# Patient Record
Sex: FEMALE | Race: WHITE | NOT HISPANIC OR LATINO | Employment: FULL TIME | URBAN - METROPOLITAN AREA
[De-identification: names, ages, dates, MRNs, and addresses within clinical notes are randomized per-mention and may not be internally consistent; named-entity substitution may affect disease eponyms.]

---

## 2018-11-29 ENCOUNTER — OFFICE VISIT (OUTPATIENT)
Dept: OBSTETRICS AND GYNECOLOGY | Facility: CLINIC | Age: 58
End: 2018-11-29
Payer: COMMERCIAL

## 2018-11-29 ENCOUNTER — HOSPITAL ENCOUNTER (OUTPATIENT)
Dept: RADIOLOGY | Facility: HOSPITAL | Age: 58
Discharge: HOME | End: 2018-11-29
Attending: OBSTETRICS & GYNECOLOGY
Payer: COMMERCIAL

## 2018-11-29 VITALS
WEIGHT: 122.81 LBS | WEIGHT: 122 LBS | BODY MASS INDEX: 23.95 KG/M2 | SYSTOLIC BLOOD PRESSURE: 112 MMHG | DIASTOLIC BLOOD PRESSURE: 80 MMHG | HEIGHT: 60 IN

## 2018-11-29 DIAGNOSIS — E78.5 HYPERLIPIDEMIA, UNSPECIFIED HYPERLIPIDEMIA TYPE: ICD-10-CM

## 2018-11-29 DIAGNOSIS — Z12.39 SCREENING FOR MALIGNANT NEOPLASM OF BREAST: ICD-10-CM

## 2018-11-29 DIAGNOSIS — D25.9 UTERINE LEIOMYOMA, UNSPECIFIED LOCATION: ICD-10-CM

## 2018-11-29 DIAGNOSIS — Z01.419 WELL WOMAN EXAM WITH ROUTINE GYNECOLOGICAL EXAM: Primary | ICD-10-CM

## 2018-11-29 DIAGNOSIS — N89.8 VAGINAL DRYNESS: ICD-10-CM

## 2018-11-29 PROCEDURE — 99999 PR PBB SHADOW E&M-NEW PATIENT-LVL III: CPT | Mod: PBBFAC,,, | Performed by: OBSTETRICS & GYNECOLOGY

## 2018-11-29 PROCEDURE — 77067 SCR MAMMO BI INCL CAD: CPT | Mod: 26,,, | Performed by: RADIOLOGY

## 2018-11-29 PROCEDURE — 77063 BREAST TOMOSYNTHESIS BI: CPT | Mod: 26,,, | Performed by: RADIOLOGY

## 2018-11-29 PROCEDURE — 87624 HPV HI-RISK TYP POOLED RSLT: CPT

## 2018-11-29 PROCEDURE — 99386 PREV VISIT NEW AGE 40-64: CPT | Mod: S$GLB,,, | Performed by: OBSTETRICS & GYNECOLOGY

## 2018-11-29 PROCEDURE — 77063 BREAST TOMOSYNTHESIS BI: CPT | Mod: TC,PN

## 2018-11-29 PROCEDURE — 88175 CYTOPATH C/V AUTO FLUID REDO: CPT

## 2018-11-29 RX ORDER — ESTRADIOL 10 UG/1
10 INSERT VAGINAL
Qty: 8 TABLET | Refills: 11 | Status: SHIPPED | OUTPATIENT
Start: 2018-11-29 | End: 2021-02-10

## 2018-11-29 NOTE — PROGRESS NOTES
Chief Complaint   Patient presents with    [facility]    Well Woman    Dyspareunia       History of Present Illness: Jess Gresham is a 58 y.o. female that presents today 2018 for well gyn visit. She reports sharp knife like pain with intercourse. She reports lower abdominal pain with running sharp pinch like at the beginning of the run. She tried premarin cream before that was messy.     Past Medical History:   Diagnosis Date    Abnormal Pap smear of cervix        Past Surgical History:   Procedure Laterality Date    COLONOSCOPY      KNEE SURGERY Bilateral        Current Outpatient Medications   Medication Sig Dispense Refill    estradiol (VAGIFEM) 10 mcg Tab Place 1 tablet (10 mcg total) vaginally every Monday and Thursday. 8 tablet 11     No current facility-administered medications for this visit.        Review of patient's allergies indicates:   Allergen Reactions    Ciprofloxacin Anaphylaxis    Darvocet a500 [propoxyphene n-acetaminophen] Hives    Oxycodone Hives       Family History   Problem Relation Age of Onset    Cancer Father        Social History     Socioeconomic History    Marital status:      Spouse name: Not on file    Number of children: Not on file    Years of education: Not on file    Highest education level: Not on file   Social Needs    Financial resource strain: Not on file    Food insecurity - worry: Not on file    Food insecurity - inability: Not on file    Transportation needs - medical: Not on file    Transportation needs - non-medical: Not on file   Occupational History    Not on file   Tobacco Use    Smoking status: Never Smoker    Smokeless tobacco: Never Used   Substance and Sexual Activity    Alcohol use: Yes     Comment: once a week    Drug use: No    Sexual activity: Yes     Partners: Male   Other Topics Concern    Not on file   Social History Narrative    Moves a lot    From [place]       OB History    Para Term  AB  Living   0 0 0 0 0 0   SAB TAB Ectopic Multiple Live Births   0 0 0 0 0             Review of Symptoms:  GENERAL: Denies weight gain or weight loss. Feeling well overall.   SKIN: Denies rash or lesions.   HEAD: Denies head injury or headache.   NODES: Denies enlarged lymph nodes.   CHEST: Denies chest pain or shortness of breath.   CARDIOVASCULAR: Denies palpitations or left sided chest pain.   ABDOMEN: No abdominal pain, constipation, diarrhea, nausea, vomiting or rectal bleeding.   URINARY: No frequency, dysuria, hematuria, or burning on urination.  HEMATOLOGIC: No easy bruisability or excessive bleeding.   MUSCULOSKELETAL: Denies joint pain or swelling.     /80   Wt 55.7 kg (122 lb 12.7 oz)   LMP 02/01/2013   Physical Exam:  APPEARANCE: Well nourished, well developed, in no acute distress.  SKIN: Normal skin turgor, no lesions.  NECK: Neck symmetric without masses   RESPIRATORY: Normal respiratory effort with no retractions or use of accessory muscles  CARDIOVASCULAR: Peripheral vascular system with no swelling no varicosities and palpation of pulses normal  LYMPHATIC: No enlargements of the lymph nodes noted in the neck, axillae, or groin  ABDOMEN: Soft. No tenderness or masses. No hepatosplenomegaly. No hernias.  BREASTS: Symmetrical, no skin changes or visible lesions. No palpable masses, nipple discharge or adenopathy bilaterally.  PELVIC: Normal external female genitalia without lesions. Normal hair distribution. Adequate perineal body, normal urethral meatus. Urethra with no masses.  Bladder nontender. Vagina moist and well rugated without lesions or discharge. Cervix pink and without lesions. No significant cystocele or rectocele. Bimanual exam showed uterus normal size, shape, position, mobile and nontender. Adnexa without masses or tenderness. Urethra and bladder normal.   EXTREMITIES: No clubbing cyanosis or edema.    ASSESSMENT/PLAN:  Well woman exam with routine gynecological exam  -      Liquid-based pap smear, screening  -     HPV High Risk Genotypes, PCR  -     CBC auto differential; Future; Expected date: 11/29/2018  -     Comprehensive metabolic panel; Future; Expected date: 11/29/2018  -     TSH; Future; Expected date: 11/29/2018    Screening for malignant neoplasm of breast  -     Cancel: Mammo Digital Screening Bilat With CAD; Future; Expected date: 11/29/2018    Uterine leiomyoma, unspecified location    Vaginal dryness  -     estradiol (VAGIFEM) 10 mcg Tab; Place 1 tablet (10 mcg total) vaginally every Monday and Thursday.  Dispense: 8 tablet; Refill: 11    Hyperlipidemia, unspecified hyperlipidemia type  -     Lipid panel; Future; Expected date: 11/29/2018          Patient was counseled today on Pap guidelines, recommendation for pelvic exams, mammograms every other year after the age of 40 and annually after the age of 50, Colonoscopy after the age of 50, Dexa Bone Scan and calcium and vitamin D supplementation in menopause and to see her PCP for other health maintenance.   FOLLOW-UP:prn

## 2018-12-04 LAB
HPV HR 12 DNA CVX QL NAA+PROBE: NEGATIVE
HPV16 AG SPEC QL: NEGATIVE
HPV18 DNA SPEC QL NAA+PROBE: NEGATIVE

## 2019-10-02 ENCOUNTER — OFFICE VISIT (OUTPATIENT)
Dept: ORTHOPEDICS | Facility: CLINIC | Age: 59
End: 2019-10-02
Payer: COMMERCIAL

## 2019-10-02 VITALS
DIASTOLIC BLOOD PRESSURE: 93 MMHG | BODY MASS INDEX: 23.94 KG/M2 | WEIGHT: 121.94 LBS | SYSTOLIC BLOOD PRESSURE: 134 MMHG | HEART RATE: 69 BPM | HEIGHT: 60 IN

## 2019-10-02 DIAGNOSIS — M65.331 TRIGGER FINGER, RIGHT MIDDLE FINGER: Primary | ICD-10-CM

## 2019-10-02 PROCEDURE — 99999 PR PBB SHADOW E&M-EST. PATIENT-LVL IV: ICD-10-PCS | Mod: PBBFAC,,, | Performed by: ORTHOPAEDIC SURGERY

## 2019-10-02 PROCEDURE — 3008F PR BODY MASS INDEX (BMI) DOCUMENTED: ICD-10-PCS | Mod: CPTII,S$GLB,, | Performed by: ORTHOPAEDIC SURGERY

## 2019-10-02 PROCEDURE — 99203 OFFICE O/P NEW LOW 30 MIN: CPT | Mod: S$GLB,,, | Performed by: ORTHOPAEDIC SURGERY

## 2019-10-02 PROCEDURE — 99203 PR OFFICE/OUTPT VISIT, NEW, LEVL III, 30-44 MIN: ICD-10-PCS | Mod: S$GLB,,, | Performed by: ORTHOPAEDIC SURGERY

## 2019-10-02 PROCEDURE — 3008F BODY MASS INDEX DOCD: CPT | Mod: CPTII,S$GLB,, | Performed by: ORTHOPAEDIC SURGERY

## 2019-10-02 PROCEDURE — 99999 PR PBB SHADOW E&M-EST. PATIENT-LVL IV: CPT | Mod: PBBFAC,,, | Performed by: ORTHOPAEDIC SURGERY

## 2019-10-02 RX ORDER — LIDOCAINE HYDROCHLORIDE 10 MG/ML
1 INJECTION, SOLUTION EPIDURAL; INFILTRATION; INTRACAUDAL; PERINEURAL ONCE
Status: CANCELLED | OUTPATIENT
Start: 2019-10-02 | End: 2019-10-02

## 2019-10-02 NOTE — PROGRESS NOTES
"10/2/2019    Chief Complaint:  Chief Complaint   Patient presents with    Pt states "Trigger Finger"     onset 1 year ago, location: right middle finger       HPI:  Jess Gresham is a 59 y.o. female, who presents to clinic today she has a 1 year history of right middle finger triggering.  She has been injected in the past.  She states that the injection only lasted for approximately 2 months.  She has had return of her symptoms which she has dealt with over the last several months.  She states that she is now having significant pain and difficulty with daily activities.  She is here today for further evaluation and treatment options.  There are no other complaints.    PMHX:  Past Medical History:   Diagnosis Date    Abnormal Pap smear of cervix        PSHX:  Past Surgical History:   Procedure Laterality Date    COLONOSCOPY  2014    KNEE SURGERY Bilateral        FMHX:  Family History   Problem Relation Age of Onset    Cancer Father        SOCHX:  Social History     Tobacco Use    Smoking status: Never Smoker    Smokeless tobacco: Never Used   Substance Use Topics    Alcohol use: Yes     Comment: once a week       ALLERGIES:  Ciprofloxacin; Darvocet a500 [propoxyphene n-acetaminophen]; and Oxycodone    CURRENT MEDICATIONS:  Current Outpatient Medications on File Prior to Visit   Medication Sig Dispense Refill    estradiol (VAGIFEM) 10 mcg Tab Place 1 tablet (10 mcg total) vaginally every Monday and Thursday. (Patient not taking: Reported on 10/2/2019) 8 tablet 11     No current facility-administered medications on file prior to visit.        REVIEW OF SYSTEMS:  Review of Systems   Constitutional: Negative.    HENT: Negative.    Eyes: Negative.    Respiratory: Negative.    Cardiovascular: Negative.    Gastrointestinal: Negative.    Genitourinary: Negative.    Musculoskeletal: Positive for joint pain. Negative for back pain, falls, myalgias and neck pain.   Skin: Negative.    Neurological: Negative.  "   Endo/Heme/Allergies: Negative.    Psychiatric/Behavioral: Negative.        GENERAL PHYSICAL EXAM:   BP (!) 134/93 Comment: provider notified  Pulse 69   Ht 5' (1.524 m)   Wt 55.3 kg (121 lb 14.6 oz)   LMP 02/01/2013   BMI 23.81 kg/m²    GEN: well developed, well nourished, no acute distress   HENT: Normocephalic, atraumatic   EYES: No discharge, conjunctiva normal   NECK: Supple, non-tender   PULM: No wheezing, no respiratory distress   CV: RRR   ABD: Soft, non-tender    ORTHO EXAM:   Examination the right hand reveals that there is no major skin change.  She does have mild edema overlying the A1 pulley of the middle finger.  Palpation in that area does produce tenderness.  There are no other areas of tenderness about the hand.  Flexion and extension reveals that there is active triggering at the A1 pulley.  She is able to disengage but it does require assistance from her other hand.  She does have sensation which is intact in the median radial and ulnar distributions. Capillary refill is less than 2 sec in all the digits.    RADIOLOGY:   None    ASSESSMENT:   Right middle finger triggering    PLAN:  1.  I have discussed treatment options with the patient. After discussion of the risks benefits and postoperative course of the surgery informed consent has been obtained to proceed with right middle finger A1 pulley release under local anesthesia    2.  She will follow up with me 2 weeks postoperatively

## 2019-10-02 NOTE — PATIENT INSTRUCTIONS
Surgery Instructions:     Your surgery is scheduled on 10/10/2019 [facility] [address] [facility].    The pre-op department will be in contact with you prior to your procedure to review medications and instructions.       Nothing to eat or drink after midnight prior to day of surgery.    The surgery center will contact you the day prior to surgery to advise you of your arrival time for surgery.     Your post op appointment is scheduled on 10/23/2019 at 8:40 AM

## 2019-10-07 ENCOUNTER — TELEPHONE (OUTPATIENT)
Dept: ORTHOPEDICS | Facility: CLINIC | Age: 59
End: 2019-10-07

## 2019-10-07 NOTE — TELEPHONE ENCOUNTER
Called to confirm with patient that she is requesting to postpone her surgery with Dr Ceron scheduled for 10/10/2019.  Left message for pt to return call to address request.

## 2019-10-08 NOTE — TELEPHONE ENCOUNTER
Pt stated she is postponing her surgery due to running a half marathon soon and still training for it.  Also, pt wants to postpone surgery due to her deductible is not met and wants to wait until January 2020 to have surgery performed.

## 2020-11-06 ENCOUNTER — CLINICAL SUPPORT (OUTPATIENT)
Dept: URGENT CARE | Facility: CLINIC | Age: 60
End: 2020-11-06
Payer: COMMERCIAL

## 2020-11-06 ENCOUNTER — OFFICE VISIT (OUTPATIENT)
Dept: URGENT CARE | Facility: CLINIC | Age: 60
End: 2020-11-06
Payer: COMMERCIAL

## 2020-11-06 VITALS
DIASTOLIC BLOOD PRESSURE: 94 MMHG | BODY MASS INDEX: 23.75 KG/M2 | HEART RATE: 82 BPM | OXYGEN SATURATION: 98 % | HEART RATE: 72 BPM | HEIGHT: 60 IN | WEIGHT: 121 LBS | SYSTOLIC BLOOD PRESSURE: 142 MMHG | OXYGEN SATURATION: 98 % | SYSTOLIC BLOOD PRESSURE: 142 MMHG | DIASTOLIC BLOOD PRESSURE: 82 MMHG | TEMPERATURE: 98 F | BODY MASS INDEX: 23.75 KG/M2 | HEIGHT: 60 IN | WEIGHT: 121 LBS | TEMPERATURE: 98 F

## 2020-11-06 DIAGNOSIS — Z20.822 EXPOSURE TO COVID-19 VIRUS: Primary | ICD-10-CM

## 2020-11-06 LAB
CTP QC/QA: YES
SARS-COV-2 RDRP RESP QL NAA+PROBE: NEGATIVE

## 2020-11-06 PROCEDURE — U0002 COVID-19 LAB TEST NON-CDC: HCPCS | Mod: QW,S$GLB,, | Performed by: PHYSICIAN ASSISTANT

## 2020-11-06 PROCEDURE — 99201 PR OFFICE/OUTPT VISIT,NEW,LEVL I: CPT | Mod: S$GLB,,, | Performed by: PHYSICIAN ASSISTANT

## 2020-11-06 PROCEDURE — 99201 PR OFFICE/OUTPT VISIT,NEW,LEVL I: ICD-10-PCS | Mod: S$GLB,,, | Performed by: PHYSICIAN ASSISTANT

## 2020-11-06 PROCEDURE — U0002: ICD-10-PCS | Mod: QW,S$GLB,, | Performed by: PHYSICIAN ASSISTANT

## 2020-11-06 NOTE — PATIENT INSTRUCTIONS
COVID rapid testing was NEGATIVE. However based on exposure to known COVID positive individual, and current lack of symptoms, You should quarantine for 14 days from the last close exposure. If at any time during this 14 day period you develop any symptoms, you are to begin a 10 day period of quarantine from the onset of those symptoms.       You should follow CDC guidelines as well as your employer/school protocols for safely returning to work/school. Please be aware that there are False Negative possibilities with testing and you should return to work based upon CDC guidelines, not simply a negative result, unless your employer/school has a different RTW protocol/guidance for you. If you are able to return to work, you should still quarantine outside of work based on CDC guidance as we discussed.       Please refer to CDC website for additional information - https://www.cdc.gov/coronavirus/2019-ncov/if-you-are-sick/index.html.

## 2020-11-06 NOTE — PROGRESS NOTES
Subjective:       Patient ID: Jess Gresham is a 60 y.o. female.    Vitals:  height is 5' (1.524 m) and weight is 54.9 kg (121 lb). Her temperature is 97.8 °F (36.6 °C). Her blood pressure is 142/82 (abnormal) and her pulse is 82. Her oxygen saturation is 98%.     Chief Complaint: No chief complaint on file.    Pt. Presents to clinic for covid testing due to exposure. Pt. Is asymptomatic.     URI   Pertinent negatives include no chest pain, congestion, coughing, diarrhea, dysuria, headaches, nausea, rash, sore throat or vomiting.       Constitution: Negative for chills, fatigue and fever.   HENT: Negative for congestion and sore throat.    Neck: Negative for painful lymph nodes.   Cardiovascular: Negative for chest pain and leg swelling.   Eyes: Negative for double vision and blurred vision.   Respiratory: Negative for cough and shortness of breath.    Gastrointestinal: Negative for nausea, vomiting and diarrhea.   Genitourinary: Negative for dysuria, frequency, urgency and history of kidney stones.   Musculoskeletal: Negative for joint pain, joint swelling, muscle cramps and muscle ache.   Skin: Negative for color change, pale, rash and bruising.   Allergic/Immunologic: Negative for seasonal allergies.   Neurological: Negative for dizziness, history of vertigo, light-headedness, passing out and headaches.   Hematologic/Lymphatic: Negative for swollen lymph nodes.   Psychiatric/Behavioral: Negative for nervous/anxious, sleep disturbance and depression. The patient is not nervous/anxious.        Objective:      Physical Exam   Constitutional: No distress.   Abdominal: Normal appearance.   Neurological: She is alert.   Nursing note and vitals reviewed.        Physical exam not performed, as this visit was for testing and consult only, pt agreed.    Assessment:       1. Exposure to COVID-19 virus        Plan:         Exposure to COVID-19 virus  -     POCT COVID-19 Rapid Screening      Patient Instructions   COVID  rapid testing was NEGATIVE. However based on exposure to known COVID positive individual, and current lack of symptoms, You should quarantine for 14 days from the last close exposure. If at any time during this 14 day period you develop any symptoms, you are to begin a 10 day period of quarantine from the onset of those symptoms.       You should follow CDC guidelines as well as your employer/school protocols for safely returning to work/school. Please be aware that there are False Negative possibilities with testing and you should return to work based upon CDC guidelines, not simply a negative result, unless your employer/school has a different RTW protocol/guidance for you. If you are able to return to work, you should still quarantine outside of work based on CDC guidance as we discussed.       Please refer to CDC website for additional information - https://www.cdc.gov/coronavirus/2019-ncov/if-you-are-sick/index.html.

## 2020-11-06 NOTE — PROGRESS NOTES
Subjective:       Patient ID: Jess Gresham is a 60 y.o. female.    Vitals:  height is 5' (1.524 m) and weight is 54.9 kg (121 lb). Her temperature is 97.9 °F (36.6 °C). Her blood pressure is 142/94 (abnormal) and her pulse is 72. Her oxygen saturation is 98%.     Chief Complaint: COVID-19 Concerns    Pt. Presents to clinic for exposure to covid postive person. Pt. Is asymptomatic.     URI   Pertinent negatives include no chest pain, congestion, coughing, diarrhea, dysuria, headaches, nausea, rash, sore throat or vomiting.       Constitution: Negative for chills, fatigue and fever.   HENT: Negative for congestion and sore throat.    Neck: Negative for painful lymph nodes.   Cardiovascular: Negative for chest pain and leg swelling.   Eyes: Negative for double vision and blurred vision.   Respiratory: Negative for cough and shortness of breath.    Gastrointestinal: Negative for nausea, vomiting and diarrhea.   Genitourinary: Negative for dysuria, frequency, urgency and history of kidney stones.   Musculoskeletal: Negative for joint pain, joint swelling, muscle cramps and muscle ache.   Skin: Negative for color change, pale, rash and bruising.   Allergic/Immunologic: Negative for seasonal allergies.   Neurological: Negative for dizziness, history of vertigo, light-headedness, passing out and headaches.   Hematologic/Lymphatic: Negative for swollen lymph nodes.   Psychiatric/Behavioral: Negative for nervous/anxious, sleep disturbance and depression. The patient is not nervous/anxious.        Objective:      Physical Exam      Assessment:       No diagnosis found.    Plan:         There are no diagnoses linked to this encounter.

## 2020-11-11 DIAGNOSIS — M25.562 LEFT KNEE PAIN, UNSPECIFIED CHRONICITY: Primary | ICD-10-CM

## 2020-11-18 ENCOUNTER — HOSPITAL ENCOUNTER (OUTPATIENT)
Dept: RADIOLOGY | Facility: HOSPITAL | Age: 60
Discharge: HOME | End: 2020-11-18
Attending: ORTHOPAEDIC SURGERY
Payer: COMMERCIAL

## 2020-11-18 ENCOUNTER — OFFICE VISIT (OUTPATIENT)
Dept: ORTHOPEDICS | Facility: CLINIC | Age: 60
End: 2020-11-18
Payer: COMMERCIAL

## 2020-11-18 VITALS — BODY MASS INDEX: 23.75 KG/M2 | RESPIRATION RATE: 16 BRPM | HEIGHT: 60 IN | WEIGHT: 121 LBS

## 2020-11-18 DIAGNOSIS — M25.562 LEFT KNEE PAIN, UNSPECIFIED CHRONICITY: Primary | ICD-10-CM

## 2020-11-18 DIAGNOSIS — M25.562 LEFT KNEE PAIN, UNSPECIFIED CHRONICITY: ICD-10-CM

## 2020-11-18 DIAGNOSIS — S83.242A ACUTE MEDIAL MENISCAL TEAR, LEFT, INITIAL ENCOUNTER: ICD-10-CM

## 2020-11-18 PROCEDURE — 73562 X-RAY EXAM OF KNEE 3: CPT | Mod: 26,59,RT, | Performed by: RADIOLOGY

## 2020-11-18 PROCEDURE — 3008F BODY MASS INDEX DOCD: CPT | Mod: CPTII,S$GLB,, | Performed by: ORTHOPAEDIC SURGERY

## 2020-11-18 PROCEDURE — 99203 OFFICE O/P NEW LOW 30 MIN: CPT | Mod: S$GLB,,, | Performed by: ORTHOPAEDIC SURGERY

## 2020-11-18 PROCEDURE — 99999 PR PBB SHADOW E&M-EST. PATIENT-LVL III: ICD-10-PCS | Mod: PBBFAC,,, | Performed by: ORTHOPAEDIC SURGERY

## 2020-11-18 PROCEDURE — 3008F PR BODY MASS INDEX (BMI) DOCUMENTED: ICD-10-PCS | Mod: CPTII,S$GLB,, | Performed by: ORTHOPAEDIC SURGERY

## 2020-11-18 PROCEDURE — 73564 X-RAY EXAM KNEE 4 OR MORE: CPT | Mod: 26,LT,, | Performed by: RADIOLOGY

## 2020-11-18 PROCEDURE — 73562 X-RAY EXAM OF KNEE 3: CPT | Mod: TC,PO,RT,59

## 2020-11-18 PROCEDURE — 1125F AMNT PAIN NOTED PAIN PRSNT: CPT | Mod: S$GLB,,, | Performed by: ORTHOPAEDIC SURGERY

## 2020-11-18 PROCEDURE — 73562 XR KNEE ORTHO LEFT WITH FLEXION: ICD-10-PCS | Mod: 26,59,RT, | Performed by: RADIOLOGY

## 2020-11-18 PROCEDURE — 73564 XR KNEE ORTHO LEFT WITH FLEXION: ICD-10-PCS | Mod: 26,LT,, | Performed by: RADIOLOGY

## 2020-11-18 PROCEDURE — 99999 PR PBB SHADOW E&M-EST. PATIENT-LVL III: CPT | Mod: PBBFAC,,, | Performed by: ORTHOPAEDIC SURGERY

## 2020-11-18 PROCEDURE — 99203 PR OFFICE/OUTPT VISIT, NEW, LEVL III, 30-44 MIN: ICD-10-PCS | Mod: S$GLB,,, | Performed by: ORTHOPAEDIC SURGERY

## 2020-11-18 PROCEDURE — 1125F PR PAIN SEVERITY QUANTIFIED, PAIN PRESENT: ICD-10-PCS | Mod: S$GLB,,, | Performed by: ORTHOPAEDIC SURGERY

## 2020-11-18 NOTE — PROGRESS NOTES
Past Medical History:   Diagnosis Date    Abnormal Pap smear of cervix        Past Surgical History:   Procedure Laterality Date    COLONOSCOPY  2014    KNEE SURGERY Bilateral        Current Outpatient Medications   Medication Sig    estradiol (VAGIFEM) 10 mcg Tab Place 1 tablet (10 mcg total) vaginally every Monday and Thursday.     No current facility-administered medications for this visit.        Review of patient's allergies indicates:   Allergen Reactions    Ciprofloxacin Anaphylaxis    Darvocet a500 [propoxyphene n-acetaminophen] Hives    Oxycodone Hives       Family History   Problem Relation Age of Onset    Cancer Father        Social History     Socioeconomic History    Marital status:      Spouse name: Not on file    Number of children: Not on file    Years of education: Not on file    Highest education level: Not on file   Occupational History    Not on file   Social Needs    Financial resource strain: Not on file    Food insecurity     Worry: Not on file     Inability: Not on file    Transportation needs     Medical: Not on file     Non-medical: Not on file   Tobacco Use    Smoking status: Never Smoker    Smokeless tobacco: Never Used   Substance and Sexual Activity    Alcohol use: Yes     Comment: once a week    Drug use: No    Sexual activity: Yes     Partners: Male   Lifestyle    Physical activity     Days per week: Not on file     Minutes per session: Not on file    Stress: Not on file   Relationships    Social connections     Talks on phone: Not on file     Gets together: Not on file     Attends [employer] service: Not on file     Active member of club or organization: Not on file     Attends meetings of clubs or organizations: Not on file     Relationship status: Not on file   Other Topics Concern    Not on file   Social History Narrative    Moves a lot    From [place]       Chief Complaint:   Chief Complaint   Patient presents with    Left Knee - Pain        History of present illness:  This is a 60-year-old active female seen for left medial knee pain.  Patient has a history of 2 previous surgeries on this left knee.  She had a microfracture of the patella back in 2002.  She then had a meniscal repair?  Back in 2016.  Patient had a new injury on September 26th when she was running and trying to do something new with her form.  The knee locked up on her at the end of the run.  She had pain along the posterior aspect of her knee and calf.  She now has significant medial joint line pain with twisting and squatting.  Patient still cannot run.  She is been using a compression brace and using NSAIDs without significant relief.  Pain is a 3/10.      Review of Systems:    Constitution: Negative for chills, fever, and sweats.  Negative for unexplained weight loss.    HENT:  Negative for headaches and blurry vision.    Cardiovascular:Negative for chest pain or irregular heart beat. Negative for hypertension.    Respiratory:  Negative for cough and shortness of breath.    Gastrointestinal: Negative for abdominal pain, heartburn, melena, nausea, and vomitting.    Genitourinary:  Negative bladder incontinence and dysuria.    Musculoskeletal:  See HPI    Neurological: Negative for numbness.    Psychiatric/Behavioral: Negative for depression.  The patient is not nervous/anxious.      Endocrine: Negative for polyuria    Hematologic/Lymphatic: Negative for bleeding problem.  Does not bruise/bleed easily.    Skin: Negative for poor would healing and rash      Physical Examination:    Vital Signs:    Vitals:    11/18/20 0846   Resp: 16       Body mass index is 23.63 kg/m².    This a well-developed, well nourished patient in no acute distress.  They are alert and oriented and cooperative to examination.  Pt. walks without an antalgic gait.      Examination of the left knee shows no rashes or erythema. There are no masses ecchymosis or effusion. Patient has full range of motion from  0-130°. Patient is nontender to palpation over lateral joint line and moderately tender to palpation over the medial joint line. Patient has a - Lachman exam, - anterior drawer exam, and - posterior drawer exam.  Positive medial Apley exam and - Debby's exam for locking but positive for pain. Knee is stable to varus and valgus stress. 5 out of 5 motor strength. Palpable distal pulses. Intact light touch sensation. Negative Patellofemoral crepitus    Examination of the right knee shows no rashes or erythema. There are no masses ecchymosis or effusion. Patient has full range of motion from 0-130°. Patient is nontender to palpation over lateral joint line and nontender to palpation over the medial joint line. Patient has a - Lachman exam, - anterior drawer exam, and - posterior drawer exam. - Debby's exam. Knee is stable to varus and valgus stress. 5 out of 5 motor strength. Palpable distal pulses. Intact light touch sensation. Negative Patellofemoral crepitus        X-rays:  X-rays of the left knee are ordered and reviewed which show some mild arthritic findings of both knees     Assessment::  Left medial meniscal tear    Plan:  Reviewed the finding with her today.  Patient has had pain on the joint line with locking and catching now for almost 2 months.  Recommended an MRI of her left knee.  Follow-up after the MRI is completed.    This note was created using [employer] voice recognition software that occasionally misinterpreted phrases or words.    Consult note is delivered via Epic messaging service.

## 2020-11-25 ENCOUNTER — PATIENT MESSAGE (OUTPATIENT)
Dept: ORTHOPEDICS | Facility: CLINIC | Age: 60
End: 2020-11-25

## 2020-11-25 ENCOUNTER — TELEPHONE (OUTPATIENT)
Dept: ORTHOPEDICS | Facility: CLINIC | Age: 60
End: 2020-11-25

## 2020-11-25 DIAGNOSIS — Z01.818 PREOP EXAMINATION: Primary | ICD-10-CM

## 2020-12-02 ENCOUNTER — LAB VISIT (OUTPATIENT)
Dept: LAB | Facility: HOSPITAL | Age: 60
End: 2020-12-02
Attending: ORTHOPAEDIC SURGERY
Payer: COMMERCIAL

## 2020-12-02 ENCOUNTER — OFFICE VISIT (OUTPATIENT)
Dept: ORTHOPEDICS | Facility: CLINIC | Age: 60
End: 2020-12-02
Payer: COMMERCIAL

## 2020-12-02 VITALS — BODY MASS INDEX: 23.75 KG/M2 | WEIGHT: 121 LBS | RESPIRATION RATE: 16 BRPM | HEIGHT: 60 IN

## 2020-12-02 DIAGNOSIS — S83.242A ACUTE MEDIAL MENISCAL TEAR, LEFT, INITIAL ENCOUNTER: Primary | ICD-10-CM

## 2020-12-02 DIAGNOSIS — Z01.818 PREOP EXAMINATION: ICD-10-CM

## 2020-12-02 LAB
ANION GAP SERPL CALC-SCNC: 9 MMOL/L (ref 8–16)
BASOPHILS # BLD AUTO: 0.1 K/UL (ref 0–0.2)
BASOPHILS NFR BLD: 1.5 % (ref 0–1.9)
BUN SERPL-MCNC: 20 MG/DL (ref 6–20)
CALCIUM SERPL-MCNC: 9.3 MG/DL (ref 8.7–10.5)
CHLORIDE SERPL-SCNC: 106 MMOL/L (ref 95–110)
CO2 SERPL-SCNC: 28 MMOL/L (ref 23–29)
CREAT SERPL-MCNC: 0.8 MG/DL (ref 0.5–1.4)
DIFFERENTIAL METHOD: ABNORMAL
EOSINOPHIL # BLD AUTO: 0.1 K/UL (ref 0–0.5)
EOSINOPHIL NFR BLD: 2.1 % (ref 0–8)
ERYTHROCYTE [DISTWIDTH] IN BLOOD BY AUTOMATED COUNT: 11.9 % (ref 11.5–14.5)
EST. GFR  (AFRICAN AMERICAN): >60 ML/MIN/1.73 M^2
EST. GFR  (NON AFRICAN AMERICAN): >60 ML/MIN/1.73 M^2
GLUCOSE SERPL-MCNC: 80 MG/DL (ref 70–110)
HCT VFR BLD AUTO: 42 % (ref 37–48.5)
HGB BLD-MCNC: 13.1 G/DL (ref 12–16)
IMM GRANULOCYTES # BLD AUTO: 0.01 K/UL (ref 0–0.04)
IMM GRANULOCYTES NFR BLD AUTO: 0.2 % (ref 0–0.5)
LYMPHOCYTES # BLD AUTO: 2.2 K/UL (ref 1–4.8)
LYMPHOCYTES NFR BLD: 32.8 % (ref 18–48)
MCH RBC QN AUTO: 29.6 PG (ref 27–31)
MCHC RBC AUTO-ENTMCNC: 31.2 G/DL (ref 32–36)
MCV RBC AUTO: 95 FL (ref 82–98)
MONOCYTES # BLD AUTO: 0.4 K/UL (ref 0.3–1)
MONOCYTES NFR BLD: 5.3 % (ref 4–15)
NEUTROPHILS # BLD AUTO: 3.8 K/UL (ref 1.8–7.7)
NEUTROPHILS NFR BLD: 58.1 % (ref 38–73)
NRBC BLD-RTO: 0 /100 WBC
PLATELET # BLD AUTO: 226 K/UL (ref 150–350)
PMV BLD AUTO: 11 FL (ref 9.2–12.9)
POTASSIUM SERPL-SCNC: 3.9 MMOL/L (ref 3.5–5.1)
RBC # BLD AUTO: 4.42 M/UL (ref 4–5.4)
SODIUM SERPL-SCNC: 143 MMOL/L (ref 136–145)
WBC # BLD AUTO: 6.55 K/UL (ref 3.9–12.7)

## 2020-12-02 PROCEDURE — 99214 OFFICE O/P EST MOD 30 MIN: CPT | Mod: 57,S$GLB,, | Performed by: ORTHOPAEDIC SURGERY

## 2020-12-02 PROCEDURE — 99999 PR PBB SHADOW E&M-EST. PATIENT-LVL III: ICD-10-PCS | Mod: PBBFAC,,, | Performed by: ORTHOPAEDIC SURGERY

## 2020-12-02 PROCEDURE — 80048 BASIC METABOLIC PNL TOTAL CA: CPT

## 2020-12-02 PROCEDURE — 3008F PR BODY MASS INDEX (BMI) DOCUMENTED: ICD-10-PCS | Mod: CPTII,S$GLB,, | Performed by: ORTHOPAEDIC SURGERY

## 2020-12-02 PROCEDURE — 1125F PR PAIN SEVERITY QUANTIFIED, PAIN PRESENT: ICD-10-PCS | Mod: S$GLB,,, | Performed by: ORTHOPAEDIC SURGERY

## 2020-12-02 PROCEDURE — 36415 COLL VENOUS BLD VENIPUNCTURE: CPT | Mod: PO

## 2020-12-02 PROCEDURE — 1125F AMNT PAIN NOTED PAIN PRSNT: CPT | Mod: S$GLB,,, | Performed by: ORTHOPAEDIC SURGERY

## 2020-12-02 PROCEDURE — 3008F BODY MASS INDEX DOCD: CPT | Mod: CPTII,S$GLB,, | Performed by: ORTHOPAEDIC SURGERY

## 2020-12-02 PROCEDURE — 99999 PR PBB SHADOW E&M-EST. PATIENT-LVL III: CPT | Mod: PBBFAC,,, | Performed by: ORTHOPAEDIC SURGERY

## 2020-12-02 PROCEDURE — 85025 COMPLETE CBC W/AUTO DIFF WBC: CPT

## 2020-12-02 PROCEDURE — 99214 PR OFFICE/OUTPT VISIT, EST, LEVL IV, 30-39 MIN: ICD-10-PCS | Mod: 57,S$GLB,, | Performed by: ORTHOPAEDIC SURGERY

## 2020-12-02 NOTE — H&P (VIEW-ONLY)
Past Medical History:   Diagnosis Date    Abnormal Pap smear of cervix        Past Surgical History:   Procedure Laterality Date    COLONOSCOPY  2014    KNEE SURGERY Bilateral        Current Outpatient Medications   Medication Sig    estradiol (VAGIFEM) 10 mcg Tab Place 1 tablet (10 mcg total) vaginally every Monday and Thursday.     No current facility-administered medications for this visit.        Review of patient's allergies indicates:   Allergen Reactions    Ciprofloxacin Anaphylaxis    Darvocet a500 [propoxyphene n-acetaminophen] Hives    Oxycodone Hives       Family History   Problem Relation Age of Onset    Cancer Father        Social History     Socioeconomic History    Marital status:      Spouse name: Not on file    Number of children: Not on file    Years of education: Not on file    Highest education level: Not on file   Occupational History    Not on file   Social Needs    Financial resource strain: Not on file    Food insecurity     Worry: Not on file     Inability: Not on file    Transportation needs     Medical: Not on file     Non-medical: Not on file   Tobacco Use    Smoking status: Never Smoker    Smokeless tobacco: Never Used   Substance and Sexual Activity    Alcohol use: Yes     Comment: once a week    Drug use: No    Sexual activity: Yes     Partners: Male   Lifestyle    Physical activity     Days per week: Not on file     Minutes per session: Not on file    Stress: Not on file   Relationships    Social connections     Talks on phone: Not on file     Gets together: Not on file     Attends [employer] service: Not on file     Active member of club or organization: Not on file     Attends meetings of clubs or organizations: Not on file     Relationship status: Not on file   Other Topics Concern    Not on file   Social History Narrative    Moves a lot    From [place]       Chief Complaint:   Chief Complaint   Patient presents with    Knee Pain     left knee-mri  results       History of present illness:  This is a 60-year-old active female seen for left medial knee pain.  Patient has a history of 2 previous surgeries on this left knee.  She had a microfracture of the patella back in 2002.  She then had a meniscal repair?  Back in 2016.  Patient had a new injury on September 26th when she was running and trying to do something new with her form.  The knee locked up on her at the end of the run.  She had pain along the posterior aspect of her knee and calf.  She now has significant medial joint line pain with twisting and squatting.  Patient still cannot run.  She is been using a compression brace and using NSAIDs without significant relief.  Pain is a 3/10.  MRI confirmed medial meniscal tear with a flap.      Review of Systems:    Constitution: Negative for chills, fever, and sweats.  Negative for unexplained weight loss.    HENT:  Negative for headaches and blurry vision.    Cardiovascular:Negative for chest pain or irregular heart beat. Negative for hypertension.    Respiratory:  Negative for cough and shortness of breath.    Gastrointestinal: Negative for abdominal pain, heartburn, melena, nausea, and vomitting.    Genitourinary:  Negative bladder incontinence and dysuria.    Musculoskeletal:  See HPI    Neurological: Negative for numbness.    Psychiatric/Behavioral: Negative for depression.  The patient is not nervous/anxious.      Endocrine: Negative for polyuria    Hematologic/Lymphatic: Negative for bleeding problem.  Does not bruise/bleed easily.    Skin: Negative for poor would healing and rash      Physical Examination:    Vital Signs:    Vitals:    12/02/20 0847   Resp: 16       Body mass index is 23.63 kg/m².    This a well-developed, well nourished patient in no acute distress.  They are alert and oriented and cooperative to examination.  Pt. walks without an antalgic gait.      Examination of the left knee shows no rashes or erythema. There are no masses  ecchymosis or effusion. Patient has full range of motion from 0-130°. Patient is nontender to palpation over lateral joint line and moderately tender to palpation over the medial joint line. Patient has a - Lachman exam, - anterior drawer exam, and - posterior drawer exam.  Positive medial Apley exam and - Debby's exam for locking but positive for pain. Knee is stable to varus and valgus stress. 5 out of 5 motor strength. Palpable distal pulses. Intact light touch sensation. Negative Patellofemoral crepitus    Heart is regular rate without obvious murmurs   Normal respiratory effort without audible wheezing  Abdomen is soft and nontender     X-rays:  X-rays of the left knee are  reviewed which show some mild arthritic findings of both knees    MRI of the left knee:Posterior horn medial meniscal tear as above.  Moderate cartilage degeneration of the medial compartment.  Markedly advanced degenerative change of the patellofemoral joint most significant laterally where there is bone-on-bone contact and extensive periarticular edematous changes.  Moderate joint effusion.     Assessment::  Left medial meniscal tear    Plan:  Reviewed the finding with her today.  Patient has had pain on the joint line with locking and catching now for almost 2 months.  Recommended surgical treatment to address the meniscal tear.  We talked about that she already has fairly advanced patellofemoral arthritis and more moderate medial compartment arthritis.  This might affect her results after surgery.  Plan is for left knee arthroscopy with partial medial meniscectomy.  Risks, benefits, and alternatives to the procedure were explained to the patient including but not limited to damage to nerves, arteries, blood vessels, bones, tendons, ligaments, stiffness, instability, infection, DVT, PE, as well as general anesthetic complications including seizure, stroke, heart attack and even death. The patient understood these risks and wished to  proceed and signed the informed consent.       This note was created using [employer] voice recognition software that occasionally misinterpreted phrases or words.    Consult note is delivered via Epic messaging service.

## 2020-12-02 NOTE — PROGRESS NOTES
Past Medical History:   Diagnosis Date    Abnormal Pap smear of cervix        Past Surgical History:   Procedure Laterality Date    COLONOSCOPY  2014    KNEE SURGERY Bilateral        Current Outpatient Medications   Medication Sig    estradiol (VAGIFEM) 10 mcg Tab Place 1 tablet (10 mcg total) vaginally every Monday and Thursday.     No current facility-administered medications for this visit.        Review of patient's allergies indicates:   Allergen Reactions    Ciprofloxacin Anaphylaxis    Darvocet a500 [propoxyphene n-acetaminophen] Hives    Oxycodone Hives       Family History   Problem Relation Age of Onset    Cancer Father        Social History     Socioeconomic History    Marital status:      Spouse name: Not on file    Number of children: Not on file    Years of education: Not on file    Highest education level: Not on file   Occupational History    Not on file   Social Needs    Financial resource strain: Not on file    Food insecurity     Worry: Not on file     Inability: Not on file    Transportation needs     Medical: Not on file     Non-medical: Not on file   Tobacco Use    Smoking status: Never Smoker    Smokeless tobacco: Never Used   Substance and Sexual Activity    Alcohol use: Yes     Comment: once a week    Drug use: No    Sexual activity: Yes     Partners: Male   Lifestyle    Physical activity     Days per week: Not on file     Minutes per session: Not on file    Stress: Not on file   Relationships    Social connections     Talks on phone: Not on file     Gets together: Not on file     Attends Congregational service: Not on file     Active member of club or organization: Not on file     Attends meetings of clubs or organizations: Not on file     Relationship status: Not on file   Other Topics Concern    Not on file   Social History Narrative    Moves a lot    From [place]       Chief Complaint:   Chief Complaint   Patient presents with    Knee Pain     left knee-mri  results       History of present illness:  This is a 60-year-old active female seen for left medial knee pain.  Patient has a history of 2 previous surgeries on this left knee.  She had a microfracture of the patella back in 2002.  She then had a meniscal repair?  Back in 2016.  Patient had a new injury on September 26th when she was running and trying to do something new with her form.  The knee locked up on her at the end of the run.  She had pain along the posterior aspect of her knee and calf.  She now has significant medial joint line pain with twisting and squatting.  Patient still cannot run.  She is been using a compression brace and using NSAIDs without significant relief.  Pain is a 3/10.  MRI confirmed medial meniscal tear with a flap.      Review of Systems:    Constitution: Negative for chills, fever, and sweats.  Negative for unexplained weight loss.    HENT:  Negative for headaches and blurry vision.    Cardiovascular:Negative for chest pain or irregular heart beat. Negative for hypertension.    Respiratory:  Negative for cough and shortness of breath.    Gastrointestinal: Negative for abdominal pain, heartburn, melena, nausea, and vomitting.    Genitourinary:  Negative bladder incontinence and dysuria.    Musculoskeletal:  See HPI    Neurological: Negative for numbness.    Psychiatric/Behavioral: Negative for depression.  The patient is not nervous/anxious.      Endocrine: Negative for polyuria    Hematologic/Lymphatic: Negative for bleeding problem.  Does not bruise/bleed easily.    Skin: Negative for poor would healing and rash      Physical Examination:    Vital Signs:    Vitals:    12/02/20 0847   Resp: 16       Body mass index is 23.63 kg/m².    This a well-developed, well nourished patient in no acute distress.  They are alert and oriented and cooperative to examination.  Pt. walks without an antalgic gait.      Examination of the left knee shows no rashes or erythema. There are no masses  ecchymosis or effusion. Patient has full range of motion from 0-130°. Patient is nontender to palpation over lateral joint line and moderately tender to palpation over the medial joint line. Patient has a - Lachman exam, - anterior drawer exam, and - posterior drawer exam.  Positive medial Apley exam and - Debby's exam for locking but positive for pain. Knee is stable to varus and valgus stress. 5 out of 5 motor strength. Palpable distal pulses. Intact light touch sensation. Negative Patellofemoral crepitus    Heart is regular rate without obvious murmurs   Normal respiratory effort without audible wheezing  Abdomen is soft and nontender     X-rays:  X-rays of the left knee are  reviewed which show some mild arthritic findings of both knees    MRI of the left knee:Posterior horn medial meniscal tear as above.  Moderate cartilage degeneration of the medial compartment.  Markedly advanced degenerative change of the patellofemoral joint most significant laterally where there is bone-on-bone contact and extensive periarticular edematous changes.  Moderate joint effusion.     Assessment::  Left medial meniscal tear    Plan:  Reviewed the finding with her today.  Patient has had pain on the joint line with locking and catching now for almost 2 months.  Recommended surgical treatment to address the meniscal tear.  We talked about that she already has fairly advanced patellofemoral arthritis and more moderate medial compartment arthritis.  This might affect her results after surgery.  Plan is for left knee arthroscopy with partial medial meniscectomy.  Risks, benefits, and alternatives to the procedure were explained to the patient including but not limited to damage to nerves, arteries, blood vessels, bones, tendons, ligaments, stiffness, instability, infection, DVT, PE, as well as general anesthetic complications including seizure, stroke, heart attack and even death. The patient understood these risks and wished to  proceed and signed the informed consent.       This note was created using [employer] voice recognition software that occasionally misinterpreted phrases or words.    Consult note is delivered via Epic messaging service.

## 2020-12-08 ENCOUNTER — LAB VISIT (OUTPATIENT)
Dept: FAMILY MEDICINE | Facility: CLINIC | Age: 60
End: 2020-12-08
Payer: COMMERCIAL

## 2020-12-08 DIAGNOSIS — Z01.818 PREOP EXAMINATION: ICD-10-CM

## 2020-12-08 PROCEDURE — U0003 INFECTIOUS AGENT DETECTION BY NUCLEIC ACID (DNA OR RNA); SEVERE ACUTE RESPIRATORY SYNDROME CORONAVIRUS 2 (SARS-COV-2) (CORONAVIRUS DISEASE [COVID-19]), AMPLIFIED PROBE TECHNIQUE, MAKING USE OF HIGH THROUGHPUT TECHNOLOGIES AS DESCRIBED BY CMS-2020-01-R: HCPCS

## 2020-12-09 LAB — SARS-COV-2 RNA RESP QL NAA+PROBE: NOT DETECTED

## 2020-12-10 ENCOUNTER — PATIENT MESSAGE (OUTPATIENT)
Dept: SURGERY | Facility: HOSPITAL | Age: 60
End: 2020-12-10

## 2020-12-10 ENCOUNTER — ANESTHESIA EVENT (OUTPATIENT)
Dept: SURGERY | Facility: HOSPITAL | Age: 60
End: 2020-12-10
Payer: COMMERCIAL

## 2020-12-11 ENCOUNTER — HOSPITAL ENCOUNTER (OUTPATIENT)
Facility: HOSPITAL | Age: 60
Discharge: HOME | End: 2020-12-11
Attending: ORTHOPAEDIC SURGERY | Admitting: ORTHOPAEDIC SURGERY
Payer: COMMERCIAL

## 2020-12-11 ENCOUNTER — ANESTHESIA (OUTPATIENT)
Dept: SURGERY | Facility: HOSPITAL | Age: 60
End: 2020-12-11
Payer: COMMERCIAL

## 2020-12-11 VITALS
RESPIRATION RATE: 16 BRPM | HEIGHT: 60 IN | DIASTOLIC BLOOD PRESSURE: 81 MMHG | TEMPERATURE: 98 F | HEART RATE: 59 BPM | BODY MASS INDEX: 23.75 KG/M2 | WEIGHT: 121 LBS | OXYGEN SATURATION: 95 % | SYSTOLIC BLOOD PRESSURE: 134 MMHG

## 2020-12-11 DIAGNOSIS — S83.242A ACUTE MEDIAL MENISCAL TEAR, LEFT, INITIAL ENCOUNTER: ICD-10-CM

## 2020-12-11 PROCEDURE — 29881 ARTHRS KNE SRG MNISECTMY M/L: CPT | Mod: LT,,, | Performed by: ORTHOPAEDIC SURGERY

## 2020-12-11 PROCEDURE — 63600175 PHARM REV CODE 636 W HCPCS: Mod: PO | Performed by: ORTHOPAEDIC SURGERY

## 2020-12-11 PROCEDURE — 63600175 PHARM REV CODE 636 W HCPCS: Mod: PO | Performed by: ANESTHESIOLOGY

## 2020-12-11 PROCEDURE — 29881 PR KNEE SCOPE SINGLE MENISECECTOMY: ICD-10-PCS | Mod: LT,,, | Performed by: ORTHOPAEDIC SURGERY

## 2020-12-11 PROCEDURE — 25000003 PHARM REV CODE 250: Mod: PO | Performed by: NURSE ANESTHETIST, CERTIFIED REGISTERED

## 2020-12-11 PROCEDURE — D9220A PRA ANESTHESIA: ICD-10-PCS | Mod: CRNA,,, | Performed by: NURSE ANESTHETIST, CERTIFIED REGISTERED

## 2020-12-11 PROCEDURE — 25000003 PHARM REV CODE 250: Mod: PO | Performed by: ORTHOPAEDIC SURGERY

## 2020-12-11 PROCEDURE — D9220A PRA ANESTHESIA: Mod: CRNA,,, | Performed by: NURSE ANESTHETIST, CERTIFIED REGISTERED

## 2020-12-11 PROCEDURE — 63600175 PHARM REV CODE 636 W HCPCS: Mod: PO | Performed by: NURSE ANESTHETIST, CERTIFIED REGISTERED

## 2020-12-11 PROCEDURE — 71000033 HC RECOVERY, INTIAL HOUR: Mod: PO | Performed by: ORTHOPAEDIC SURGERY

## 2020-12-11 PROCEDURE — 27200651 HC AIRWAY, LMA: Mod: PO | Performed by: ANESTHESIOLOGY

## 2020-12-11 PROCEDURE — 37000009 HC ANESTHESIA EA ADD 15 MINS: Mod: PO | Performed by: ORTHOPAEDIC SURGERY

## 2020-12-11 PROCEDURE — D9220A PRA ANESTHESIA: ICD-10-PCS | Mod: ANES,,, | Performed by: ANESTHESIOLOGY

## 2020-12-11 PROCEDURE — 71000039 HC RECOVERY, EACH ADD'L HOUR: Mod: PO | Performed by: ORTHOPAEDIC SURGERY

## 2020-12-11 PROCEDURE — 36000711: Mod: PO | Performed by: ORTHOPAEDIC SURGERY

## 2020-12-11 PROCEDURE — 36000710: Mod: PO | Performed by: ORTHOPAEDIC SURGERY

## 2020-12-11 PROCEDURE — D9220A PRA ANESTHESIA: Mod: ANES,,, | Performed by: ANESTHESIOLOGY

## 2020-12-11 PROCEDURE — 37000008 HC ANESTHESIA 1ST 15 MINUTES: Mod: PO | Performed by: ORTHOPAEDIC SURGERY

## 2020-12-11 PROCEDURE — 71000015 HC POSTOP RECOV 1ST HR: Mod: PO | Performed by: ORTHOPAEDIC SURGERY

## 2020-12-11 PROCEDURE — 20610 PR DRAIN/INJECT LARGE JOINT/BURSA: ICD-10-PCS | Mod: 59,51,LT, | Performed by: ORTHOPAEDIC SURGERY

## 2020-12-11 PROCEDURE — 27201423 OPTIME MED/SURG SUP & DEVICES STERILE SUPPLY: Mod: PO | Performed by: ORTHOPAEDIC SURGERY

## 2020-12-11 PROCEDURE — 20610 DRAIN/INJ JOINT/BURSA W/O US: CPT | Mod: 59,51,LT, | Performed by: ORTHOPAEDIC SURGERY

## 2020-12-11 RX ORDER — TRAMADOL HYDROCHLORIDE 50 MG/1
50 TABLET ORAL EVERY 6 HOURS PRN
Qty: 10 TABLET | Refills: 0 | Status: SHIPPED | OUTPATIENT
Start: 2020-12-11 | End: 2020-12-21

## 2020-12-11 RX ORDER — ACETAMINOPHEN 10 MG/ML
INJECTION, SOLUTION INTRAVENOUS
Status: DISCONTINUED | OUTPATIENT
Start: 2020-12-11 | End: 2020-12-11

## 2020-12-11 RX ORDER — HYDROMORPHONE HYDROCHLORIDE 2 MG/ML
0.2 INJECTION, SOLUTION INTRAMUSCULAR; INTRAVENOUS; SUBCUTANEOUS EVERY 5 MIN PRN
Status: DISCONTINUED | OUTPATIENT
Start: 2020-12-11 | End: 2020-12-11 | Stop reason: HOSPADM

## 2020-12-11 RX ORDER — LIDOCAINE HYDROCHLORIDE 10 MG/ML
1 INJECTION, SOLUTION EPIDURAL; INFILTRATION; INTRACAUDAL; PERINEURAL ONCE
Status: DISCONTINUED | OUTPATIENT
Start: 2020-12-11 | End: 2021-08-09

## 2020-12-11 RX ORDER — SODIUM CHLORIDE, SODIUM LACTATE, POTASSIUM CHLORIDE, CALCIUM CHLORIDE 600; 310; 30; 20 MG/100ML; MG/100ML; MG/100ML; MG/100ML
INJECTION, SOLUTION INTRAVENOUS CONTINUOUS
Status: DISCONTINUED | OUTPATIENT
Start: 2020-12-11 | End: 2021-08-09

## 2020-12-11 RX ORDER — CEFAZOLIN SODIUM 2 G/50ML
2 SOLUTION INTRAVENOUS
Status: COMPLETED | OUTPATIENT
Start: 2020-12-11 | End: 2020-12-11

## 2020-12-11 RX ORDER — SODIUM CHLORIDE 0.9 % (FLUSH) 0.9 %
3 SYRINGE (ML) INJECTION
Status: DISCONTINUED | OUTPATIENT
Start: 2020-12-11 | End: 2020-12-11 | Stop reason: HOSPADM

## 2020-12-11 RX ORDER — FENTANYL CITRATE 50 UG/ML
INJECTION, SOLUTION INTRAMUSCULAR; INTRAVENOUS
Status: DISCONTINUED | OUTPATIENT
Start: 2020-12-11 | End: 2020-12-11

## 2020-12-11 RX ORDER — MEPERIDINE HYDROCHLORIDE 50 MG/ML
12.5 INJECTION INTRAMUSCULAR; INTRAVENOUS; SUBCUTANEOUS ONCE AS NEEDED
Status: DISCONTINUED | OUTPATIENT
Start: 2020-12-11 | End: 2020-12-11 | Stop reason: HOSPADM

## 2020-12-11 RX ORDER — PROPOFOL 10 MG/ML
VIAL (ML) INTRAVENOUS
Status: DISCONTINUED | OUTPATIENT
Start: 2020-12-11 | End: 2020-12-11

## 2020-12-11 RX ORDER — DIPHENHYDRAMINE HYDROCHLORIDE 50 MG/ML
25 INJECTION INTRAMUSCULAR; INTRAVENOUS EVERY 6 HOURS PRN
Status: DISCONTINUED | OUTPATIENT
Start: 2020-12-11 | End: 2020-12-11 | Stop reason: HOSPADM

## 2020-12-11 RX ORDER — KETOROLAC TROMETHAMINE 30 MG/ML
INJECTION, SOLUTION INTRAMUSCULAR; INTRAVENOUS
Status: DISCONTINUED | OUTPATIENT
Start: 2020-12-11 | End: 2020-12-11

## 2020-12-11 RX ORDER — FENTANYL CITRATE 50 UG/ML
25 INJECTION, SOLUTION INTRAMUSCULAR; INTRAVENOUS EVERY 5 MIN PRN
Status: COMPLETED | OUTPATIENT
Start: 2020-12-11 | End: 2020-12-11

## 2020-12-11 RX ORDER — MIDAZOLAM HYDROCHLORIDE 1 MG/ML
INJECTION, SOLUTION INTRAMUSCULAR; INTRAVENOUS
Status: DISCONTINUED | OUTPATIENT
Start: 2020-12-11 | End: 2020-12-11

## 2020-12-11 RX ORDER — LIDOCAINE HYDROCHLORIDE 20 MG/ML
INJECTION INTRAVENOUS
Status: DISCONTINUED | OUTPATIENT
Start: 2020-12-11 | End: 2020-12-11

## 2020-12-11 RX ORDER — ASPIRIN 81 MG/1
81 TABLET ORAL 2 TIMES DAILY
Refills: 0 | COMMUNITY
Start: 2020-12-11 | End: 2021-08-08

## 2020-12-11 RX ORDER — EPINEPHRINE 1 MG/ML
INJECTION, SOLUTION INTRACARDIAC; INTRAMUSCULAR; INTRAVENOUS; SUBCUTANEOUS
Status: DISCONTINUED | OUTPATIENT
Start: 2020-12-11 | End: 2020-12-11 | Stop reason: HOSPADM

## 2020-12-11 RX ORDER — KETOROLAC TROMETHAMINE 30 MG/ML
30 INJECTION, SOLUTION INTRAMUSCULAR; INTRAVENOUS ONCE
Status: COMPLETED | OUTPATIENT
Start: 2020-12-11 | End: 2020-12-11

## 2020-12-11 RX ORDER — BUPIVACAINE HYDROCHLORIDE AND EPINEPHRINE 2.5; 5 MG/ML; UG/ML
INJECTION, SOLUTION EPIDURAL; INFILTRATION; INTRACAUDAL; PERINEURAL
Status: DISCONTINUED | OUTPATIENT
Start: 2020-12-11 | End: 2020-12-11 | Stop reason: HOSPADM

## 2020-12-11 RX ORDER — ONDANSETRON 2 MG/ML
INJECTION INTRAMUSCULAR; INTRAVENOUS
Status: DISCONTINUED | OUTPATIENT
Start: 2020-12-11 | End: 2020-12-11

## 2020-12-11 RX ADMIN — FENTANYL CITRATE 50 MCG: 50 INJECTION, SOLUTION INTRAMUSCULAR; INTRAVENOUS at 09:12

## 2020-12-11 RX ADMIN — LIDOCAINE HYDROCHLORIDE 75 MG: 20 INJECTION, SOLUTION INTRAVENOUS at 09:12

## 2020-12-11 RX ADMIN — KETOROLAC TROMETHAMINE 30 MG: 30 INJECTION, SOLUTION INTRAMUSCULAR; INTRAVENOUS at 09:12

## 2020-12-11 RX ADMIN — FENTANYL CITRATE 25 MCG: 50 INJECTION, SOLUTION INTRAMUSCULAR; INTRAVENOUS at 10:12

## 2020-12-11 RX ADMIN — KETOROLAC TROMETHAMINE 30 MG: 30 INJECTION, SOLUTION INTRAMUSCULAR; INTRAVENOUS at 10:12

## 2020-12-11 RX ADMIN — PROPOFOL 170 MG: 10 INJECTION, EMULSION INTRAVENOUS at 09:12

## 2020-12-11 RX ADMIN — ACETAMINOPHEN 1000 MG: 10 INJECTION, SOLUTION INTRAVENOUS at 09:12

## 2020-12-11 RX ADMIN — MIDAZOLAM 2 MG: 1 INJECTION INTRAMUSCULAR; INTRAVENOUS at 09:12

## 2020-12-11 RX ADMIN — CEFAZOLIN SODIUM 2 G: 2 SOLUTION INTRAVENOUS at 09:12

## 2020-12-11 RX ADMIN — SODIUM CHLORIDE, SODIUM LACTATE, POTASSIUM CHLORIDE, AND CALCIUM CHLORIDE: .6; .31; .03; .02 INJECTION, SOLUTION INTRAVENOUS at 08:12

## 2020-12-11 RX ADMIN — ONDANSETRON 4 MG: 2 INJECTION, SOLUTION INTRAMUSCULAR; INTRAVENOUS at 09:12

## 2020-12-11 NOTE — TRANSFER OF CARE
Anesthesia Transfer of Care Note    Patient: Jess Gresham    Procedure(s) Performed: Procedure(s) (LRB):  ARTHROSCOPY, KNEE, WITH MENISCECTOMY and a baker cyst aspiration (Left)  CHONDROPLASTY (Left)    Patient location: PACU    Anesthesia Type: general    Transport from OR: Transported from OR on room air with adequate spontaneous ventilation    Post pain: adequate analgesia    Post assessment: no apparent anesthetic complications and tolerated procedure well    Post vital signs: stable    Level of consciousness: awake and alert    Nausea/Vomiting: no nausea/vomiting    Complications: none    Transfer of care protocol was followed      Last vitals:   Visit Vitals  /82   Pulse 70   Temp 36.7 °C (98 °F) (Skin)   Resp 17   Ht 5' (1.524 m)   Wt 54.9 kg (121 lb)   LMP 02/01/2013   SpO2 99%   Breastfeeding No   BMI 23.63 kg/m²

## 2020-12-11 NOTE — INTERVAL H&P NOTE
The patient has been examined and the H&P has been reviewed:    I concur with the findings and no changes have occurred since H&P was written.    Surgery risks, benefits and alternative options discussed and understood by patient/family.          Active Hospital Problems    Diagnosis  POA    Acute medial meniscal tear, left, initial encounter [S86.242A]  Yes      Resolved Hospital Problems   No resolved problems to display.

## 2020-12-11 NOTE — OP NOTE
[facility]  Orthopedic Surgery  Operative Note    SUMMARY     Date of Procedure: 12/11/2020     Procedure: Procedure(s) (LRB):  ARTHROSCOPY, KNEE, WITH MENISCECTOMY    baker cyst aspiration (Left)  CHONDROPLASTY of the medial femoral condyle and patellofemoral joint (Left)       Surgeon(s) and Role:     * iMkey Amaro MD - Primary    Assistant:  Briana Pratt    Pre-Operative Diagnosis: Acute medial meniscal tear, left, initial encounter [S83.242A]  Left Baker cyst  Left patellofemoral arthritis    Post-Operative Diagnosis: Post-Op Diagnosis Codes:     * Acute medial meniscal tear, left, initial encounter [S83.242A]  Left Baker cyst  Left patellofemoral arthritis    Anesthesia: General    Diagnostic arthroscopy findings: Diagnostic arthroscopy findings, the patient's medial compartment was thoroughly examined. The patient a focal area of the medial femoral condyle that measured about 1.5 centimeters in length by 0.5 centimeter in width and complex tear of the posterior horn of the medial meniscus. ACL and PCL were both intact with   good tension. Lateral compartment showed no tearing as well with no articular   wear. In the patellofemoral joint, the patient had lateralize tracking and severe grade 4 wear of the lateral trochlea and lateral patella.    Complications: No    Estimated Blood Loss (EBL): 5ml    Tourniquet Time: 10min at 300mmHg           Implants: * No implants in log *    Specimens:   Specimen (12h ago, onward)    None                  Condition: Good    Disposition: PACU - hemodynamically stable.    Attestation: I was present and scrubbed for the entire procedure.    INDICATIONS FOR THE PROCEDURE:  This is an active 60-year-old female who is having locking and catching in the left knee.  MRI showed a medial meniscal tear as well as some concomitant arthritis.  Patient wished to proceed with the arthroscopy to address the meniscus.  Patient understands limitations because of  underlying arthritis.    PROCEDURE IN DETAIL: Risks, benefits and alternatives of the procedure were   explained to the patient including, but not limited to damage to nerves,   arteries, blood vessels. Also explained risk of infection, DVT, PE, continued pain due to arthritis,  as well as   anesthetic complications including seizure, stroke, heart attack and death. They  understood this and signed informed consent. The patient's Left knee was marked prior to coming to the Operating Room. Once there a formal   timeout was done in which correct patient, procedure and op site were all   correctly identified and confirmed by the entire operating team.  Appropriate preoperative antibiotic was   given prior to surgical incision. Laryngeal mask anesthesia was induced. The   patient's Left lower extremity was prepped and draped in normal sterile fashion.   Leg was then exsanguinated with a tourniquet and tourniquet was inflated up   300 mmHg. Standard inferior lateral portal was then made. A spinal needle was   used to localize an inferior medial portal and this was made under direct   arthroscopic visualization. Diagnostic arthroscopy was then performed with the   findings listed above. Shaver was used to remove redundant fat pad and   Synovium within the notch.  Shaver was used to perform a chondroplasty of the medial femoral condyle chondral issue.  Loose flaps of the cartilage was debrided back to more stable chondral margins.A combination of arthroscopic biters and 3.5mm full radius shaver was used to perform a partial menisectomy back to stable healthy appearing tissue.    We then placed the knee back into full extension.  Delcid cyst was palpated and a spinal needle was introduced just underneath the skin.  Fluid was dripping out of the needle.  A syringe was placed in about 10 cubic centimeters of Baker cyst fluid was removed.      Proceeded with closing. All   excess water was removed from the knee joint. Portals  were closed using   nylons. Portal was then injected with 0.25% Marcaine with epinephrine. Sterile   dressing was then applied. They were then extubated and awakened and transferred   from the Operating Room to the Recovery Room in stable condition.     Postop course is for an arthroscopic medial meniscectomy and Baker cyst aspiration

## 2020-12-11 NOTE — DISCHARGE INSTRUCTIONS
1.Diet: Ice chips, clear liquids, and then diet as tolerated. Drink plenty of liquids.  2.Ice the area at least three times a day (20 minutes per session).  3.Elevate the extremity above the level of the heart to help reduce swelling.  4.Pain medication can be taken every four to six hours as needed. It is helpful to take pain medication prior to physical therapy.  Use Tylenol or anti-inflammatories for pain as much as possible.  Pain medication is for pain not responsive to over-the-counter medications.  5.Any activity that requires precise thinking or accuracy should be avoided for a minimum of 72 hours after surgery and while on narcotic pain medication. This includes operating machinery and/or driving a vehicle.  6.All sutures/staples will be removed approximately 14 days from the time of surgery. Leave steri-strips (skin tapes) in place until sutures are removed.  7. If skin glue is used instead of stitches, do not apply ointments or solutions to the incision. Keep the incision dry. The skin glue will peel off in 3-4 weeks.  8. Change dressing on the first post-op day. Use gauze for the first 3 days, then start using Band-Aids over the incision sites.   9. All casts, splints, braces, slings, crutches, abduction pillows, etc... Are to be worn as instructed. Crutches are just to be used as needed. If not needed for soreness or balance, may weight bear as tolerated without the crutches.  10. Keep the incision dry for 10-14 days. A waterproof dressing (purchase at [employer], [employer], etc) can be used to shower. No bath, pool, hot tub until instructed.  11. Call [phone] with any questions or concerns.  Discharge Instructions: After Your Surgery/Procedure  Youve just had surgery. During surgery you were given medicine called anesthesia to keep you relaxed and free of pain. After surgery you may have some pain or nausea. This is common. Here are some tips for feeling better and getting well after surgery.     Stay on  "schedule with your medication.   Going home  Your doctor or nurse will show you how to take care of yourself when you go home. He or she will also answer your questions. Have an adult family member or friend drive you home.      For your safety we recommend these precaution for the first 24 hours after your procedure:  · Do not drive or use heavy equipment.  · Do not make important decisions or sign legal papers.  · Do not drink alcohol.  · Have someone stay with you, if needed. He or she can watch for problems and help keep you safe.  · Your concentration, balance, coordination, and judgement may be impaired for many hours after anesthesia.  Use caution when ambulating or standing up.     · You may feel weak and "washed out" after anesthesia and surgery.      Subtle residual effects of general anesthesia or sedation with regional / local anesthesia can last more than 24 hours.  Rest for the remainder of the day or longer if your Doctor/Surgeon has advised you to do so.  Although you may feel normal within the first 24 hours, your reflexes and mental ability may be impaired without you realizing it.  You may feel dizzy, lightheaded or sleepy for 24 hours or longer.      Be sure to go to all follow-up visits with your doctor. And rest after your surgery for as long as your doctor tells you to.  Coping with pain  If you have pain after surgery, pain medicine will help you feel better. Take it as told, before pain becomes severe. Also, ask your doctor or pharmacist about other ways to control pain. This might be with heat, ice, or relaxation. And follow any other instructions your surgeon or nurse gives you.  Tips for taking pain medicine  To get the best relief possible, remember these points:  · Pain medicines can upset your stomach. Taking them with a little food may help.  · Most pain relievers taken by mouth need at least 20 to 30 minutes to start to work.  · Taking medicine on a schedule can help you remember to " take it. Try to time your medicine so that you can take it before starting an activity. This might be before you get dressed, go for a walk, or sit down for dinner.  · Constipation is a common side effect of pain medicines. Call your doctor before taking any medicines such as laxatives or stool softeners to help ease constipation. Also ask if you should skip any foods. Drinking lots of fluids and eating foods such as fruits and vegetables that are high in fiber can also help. Remember, do not take laxatives unless your surgeon has prescribed them.  · Drinking alcohol and taking pain medicine can cause dizziness and slow your breathing. It can even be deadly. Do not drink alcohol while taking pain medicine.  · Pain medicine can make you react more slowly to things. Do not drive or run machinery while taking pain medicine.  Your health care provider may tell you to take acetaminophen to help ease your pain. Ask him or her how much you are supposed to take each day. Acetaminophen or other pain relievers may interact with your prescription medicines or other over-the-counter (OTC) drugs. Some prescription medicines have acetaminophen and other ingredients. Using both prescription and OTC acetaminophen for pain can cause you to overdose. Read the labels on your OTC medicines with care. This will help you to clearly know the list of ingredients, how much to take, and any warnings. It may also help you not take too much acetaminophen. If you have questions or do not understand the information, ask your pharmacist or health care provider to explain it to you before you take the OTC medicine.  Managing nausea  Some people have an upset stomach after surgery. This is often because of anesthesia, pain, or pain medicine, or the stress of surgery. These tips will help you handle nausea and eat healthy foods as you get better. If you were on a special food plan before surgery, ask your doctor if you should follow it while you get  better. These tips may help:  · Do not push yourself to eat. Your body will tell you when to eat and how much.  · Start off with clear liquids and soup. They are easier to digest.  · Next try semi-solid foods, such as mashed potatoes, applesauce, and gelatin, as you feel ready.  · Slowly move to solid foods. Dont eat fatty, rich, or spicy foods at first.  · Do not force yourself to have 3 large meals a day. Instead eat smaller amounts more often.  · Take pain medicines with a small amount of solid food, such as crackers or toast, to avoid nausea.     Call your surgeon if  · You still have pain an hour after taking medicine. The medicine may not be strong enough.  · You feel too sleepy, dizzy, or groggy. The medicine may be too strong.  · You have side effects like nausea, vomiting, or skin changes, such as rash, itching, or hives.       If you have obstructive sleep apnea  You were given anesthesia medicine during surgery to keep you comfortable and free of pain. After surgery, you may have more apnea spells because of this medicine and other medicines you were given. The spells may last longer than usual.   At home:  · Keep using the continuous positive airway pressure (CPAP) device when you sleep. Unless your health care provider tells you not to, use it when you sleep, day or night. CPAP is a common device used to treat obstructive sleep apnea.  · Talk with your provider before taking any pain medicine, muscle relaxants, or sedatives. Your provider will tell you about the possible dangers of taking these medicines.  © 5214-3875 [employer] [address] [place] [address], [place] [address]. All rights reserved. This information is not intended as a substitute for professional medical care. Always follow your healthcare professional's instructions.

## 2020-12-11 NOTE — ANESTHESIA PROCEDURE NOTES
Intubation  Performed by: Han Bui CRNA  Authorized by: Kian Barraza MD     Intubation:     Induction:  Intravenous    Intubated:  Postinduction    Mask Ventilation:  Easy mask    Attempts:  1    Attempted By:  CRNA    Difficult Airway Encountered?: No      Complications:  None    Airway Device:  Supraglottic airway/LMA    Airway Device Size:  4.0    Style/Cuff Inflation:  Cuffed    Inflation Amount (mL):  28    Secured at:  The lips    Placement Verified By:  Capnometry    Complicating Factors:  None    Findings Post-Intubation:  BS equal bilateral and atraumatic/condition of teeth unchanged

## 2020-12-11 NOTE — DISCHARGE SUMMARY
[facility]  Discharge Note  Short Stay    Procedure(s) (LRB):  ARTHROSCOPY, KNEE, WITH MENISCECTOMY and a baker cyst aspiration (Left)  CHONDROPLASTY (Left)    OUTCOME: Patient tolerated treatment/procedure well without complication and is now ready for discharge.    DISPOSITION: Home or Self Care    FINAL DIAGNOSIS:  Acute medial meniscal tear, left, initial encounter    FOLLOWUP: In clinic    DISCHARGE INSTRUCTIONS:    Discharge Procedure Orders   Diet Adult Regular     Remove dressing in 24 hours     Change dressing (specify)   Order Comments: Dressing change: One time per day using Waterproof Bandaids.     Activity as tolerated

## 2020-12-11 NOTE — ANESTHESIA POSTPROCEDURE EVALUATION
Anesthesia Post Evaluation    Patient: Jess Gresham    Procedure(s) Performed: Procedure(s) (LRB):  ARTHROSCOPY, KNEE, WITH MENISCECTOMY and a baker cyst aspiration (Left)  CHONDROPLASTY (Left)    Final Anesthesia Type: general    Patient location during evaluation: PACU  Patient participation: Yes- Able to Participate  Level of consciousness: awake and alert and oriented  Post-procedure vital signs: reviewed and stable  Pain management: adequate  Airway patency: patent    PONV status at discharge: No PONV  Anesthetic complications: no      Cardiovascular status: blood pressure returned to baseline and stable  Respiratory status: unassisted and spontaneous ventilation  Hydration status: euvolemic  Follow-up not needed.          Vitals Value Taken Time   /81 12/11/20 1100   Temp 36.7 °C (98 °F) 12/11/20 1100   Pulse 59 12/11/20 1100   Resp 16 12/11/20 1100   SpO2 95 % 12/11/20 1100         Event Time   Out of Recovery 10:59:00         Pain/Reanna Score: Pain Rating Prior to Med Admin: 2 (12/11/2020 11:00 AM)  Pain Rating Post Med Admin: 2 (12/11/2020 11:00 AM)  Reanna Score: 10 (12/11/2020 11:00 AM)

## 2020-12-11 NOTE — ANESTHESIA PREPROCEDURE EVALUATION
12/11/2020  Jess Gresham is a 60 y.o., female.    Anesthesia Evaluation    I have reviewed the Patient Summary Reports.    I have reviewed the Nursing Notes.    I have reviewed the Medications.     Review of Systems  Anesthesia Hx:  No problems with previous Anesthesia    Social:  Non-Smoker    Cardiovascular:  Cardiovascular Normal     Pulmonary:  Pulmonary Normal    Renal/:  Renal/ Normal     Neurological:  Neurology Normal    Endocrine:  Endocrine Normal        Physical Exam  General:  Well nourished    Airway/Jaw/Neck:  Airway Findings: Mouth Opening: Normal Tongue: Normal  General Airway Assessment: Adult  Oropharynx Findings:  Mallampati: II  Jaw/Neck Findings:  Neck ROM: Normal ROM     Eyes/Ears/Nose:  Eyes/Ears/Nose Findings:    Dental:  Dental Findings:   Chest/Lungs:  Chest/Lungs Findings: Normal Respiratory Rate     Heart/Vascular:  Heart Findings: Rate: Normal  Rhythm: Regular Rhythm        Mental Status:  Mental Status Findings:  Cooperative, Alert and Oriented         Anesthesia Plan  Type of Anesthesia, risks & benefits discussed:  Anesthesia Type:  general  Patient's Preference:   Intra-op Monitoring Plan: standard ASA monitors  Intra-op Monitoring Plan Comments:   Post Op Pain Control Plan: multimodal analgesia  Post Op Pain Control Plan Comments:   Induction:   IV  Beta Blocker:  Patient is not currently on a Beta-Blocker (No further documentation required).       Informed Consent: Patient understands risks and agrees with Anesthesia plan.  Questions answered. Anesthesia consent signed with patient.  ASA Score: 1     Day of Surgery Review of History & Physical:            Ready For Surgery From Anesthesia Perspective.

## 2020-12-23 ENCOUNTER — OFFICE VISIT (OUTPATIENT)
Dept: ORTHOPEDICS | Facility: CLINIC | Age: 60
End: 2020-12-23
Payer: COMMERCIAL

## 2020-12-23 VITALS — HEIGHT: 60 IN | BODY MASS INDEX: 23.75 KG/M2 | RESPIRATION RATE: 16 BRPM | WEIGHT: 121 LBS

## 2020-12-23 DIAGNOSIS — S83.242D ACUTE MEDIAL MENISCAL TEAR, LEFT, SUBSEQUENT ENCOUNTER: Primary | ICD-10-CM

## 2020-12-23 PROCEDURE — 3008F BODY MASS INDEX DOCD: CPT | Mod: CPTII,S$GLB,, | Performed by: ORTHOPAEDIC SURGERY

## 2020-12-23 PROCEDURE — 1125F PR PAIN SEVERITY QUANTIFIED, PAIN PRESENT: ICD-10-PCS | Mod: S$GLB,,, | Performed by: ORTHOPAEDIC SURGERY

## 2020-12-23 PROCEDURE — 3008F PR BODY MASS INDEX (BMI) DOCUMENTED: ICD-10-PCS | Mod: CPTII,S$GLB,, | Performed by: ORTHOPAEDIC SURGERY

## 2020-12-23 PROCEDURE — 1125F AMNT PAIN NOTED PAIN PRSNT: CPT | Mod: S$GLB,,, | Performed by: ORTHOPAEDIC SURGERY

## 2020-12-23 PROCEDURE — 99999 PR PBB SHADOW E&M-EST. PATIENT-LVL III: CPT | Mod: PBBFAC,,, | Performed by: ORTHOPAEDIC SURGERY

## 2020-12-23 PROCEDURE — 99024 PR POST-OP FOLLOW-UP VISIT: ICD-10-PCS | Mod: S$GLB,,, | Performed by: ORTHOPAEDIC SURGERY

## 2020-12-23 PROCEDURE — 99024 POSTOP FOLLOW-UP VISIT: CPT | Mod: S$GLB,,, | Performed by: ORTHOPAEDIC SURGERY

## 2020-12-23 PROCEDURE — 99999 PR PBB SHADOW E&M-EST. PATIENT-LVL III: ICD-10-PCS | Mod: PBBFAC,,, | Performed by: ORTHOPAEDIC SURGERY

## 2020-12-23 NOTE — PROGRESS NOTES
Past Medical History:   Diagnosis Date    Abnormal Pap smear of cervix        Past Surgical History:   Procedure Laterality Date    CHONDROPLASTY Left 12/11/2020    Procedure: CHONDROPLASTY;  Surgeon: Mikey Amaro MD;  Location: [facility] OR;  Service: Orthopedics;  Laterality: Left;    COLONOSCOPY  2014    KNEE ARTHROSCOPY W/ MENISCECTOMY Left 12/11/2020    Procedure: ARTHROSCOPY, KNEE, WITH MENISCECTOMY and a baker cyst aspiration;  Surgeon: Mikey Amaro MD;  Location: [facility] OR;  Service: Orthopedics;  Laterality: Left;  medial     KNEE SURGERY Bilateral        Current Outpatient Medications   Medication Sig    aspirin (ECOTRIN) 81 MG EC tablet Take 1 tablet (81 mg total) by mouth 2 (two) times daily.    estradiol (VAGIFEM) 10 mcg Tab Place 1 tablet (10 mcg total) vaginally every Monday and Thursday.     No current facility-administered medications for this visit.      Facility-Administered Medications Ordered in Other Visits   Medication    lactated ringers infusion    lidocaine (PF) 10 mg/ml (1%) injection 10 mg       Review of patient's allergies indicates:   Allergen Reactions    Ciprofloxacin Anaphylaxis    Darvocet a500 [propoxyphene n-acetaminophen] Hives    Oxycodone Hives       Family History   Problem Relation Age of Onset    Cancer Father        Social History     Socioeconomic History    Marital status:      Spouse name: Not on file    Number of children: Not on file    Years of education: Not on file    Highest education level: Not on file   Occupational History    Not on file   Social Needs    Financial resource strain: Not on file    Food insecurity     Worry: Not on file     Inability: Not on file    Transportation needs     Medical: Not on file     Non-medical: Not on file   Tobacco Use    Smoking status: Never Smoker    Smokeless tobacco: Never Used   Substance and Sexual Activity    Alcohol use: Yes     Comment: once a week    Drug use: No     Sexual activity: Yes     Partners: Male   Lifestyle    Physical activity     Days per week: Not on file     Minutes per session: Not on file    Stress: Not on file   Relationships    Social connections     Talks on phone: Not on file     Gets together: Not on file     Attends Christian service: Not on file     Active member of club or organization: Not on file     Attends meetings of clubs or organizations: Not on file     Relationship status: Not on file   Other Topics Concern    Not on file   Social History Narrative    Moves a lot    From [place]       Chief Complaint:   Chief Complaint   Patient presents with    Post-op Evaluation     left knee s/p scope 12/11/2020       Date of surgery:  December 11, 2020    History of present illness:  This is a 60-year-old female underwent left partial medial meniscectomy.  Patient also pretty severe patellofemoral arthritis.  She is doing very well.  Very minimal pain.  Occasional discomfort over the medial compartment with certain movements.      Review of Systems:    Musculoskeletal:  See HPI        Physical Examination:    Vital Signs:    Vitals:    12/23/20 1333   Resp: 16       Body mass index is 23.63 kg/m².    This a well-developed, well nourished patient in no acute distress.  They are alert and oriented and cooperative to examination.  Pt. walks without an antalgic gait.      Examination left knee shows well-healing surgical portals.  No erythema drainage.  Patient has full range of motion.  No calf pain.  Negative Homans sign.    X-rays:  X-rays of the left knee are reviewed which show some mild arthritic change of the tibial femoral joint but more severe patellofemoral arthritis.KL score of 3     Assessment::  Status post left knee arthroscopy with partial medial meniscectomy  Severe patellofemoral arthritis    Plan:  I reviewed the findings with her today.  I gave her a knee scope guide to start working on.  She is cleared to swim and to start cycling.  I  will touch base with her at the gym as to her recovery.We talked about viscosupplementation.     The patient has tried a self guided exercise program that has included walking and cycling without significant improvement. Minimal relief with tylenol or OTC Nsaids. Reports less than 8 weeks relief with IA steroid injection. Kellgren Duane scale of 3. They are not receiving another HA injectable at this time. I will precert for gel injection.       This note was created using [employer] voice recognition software that occasionally misinterpreted phrases or words.

## 2021-01-27 DIAGNOSIS — M17.12 PRIMARY OSTEOARTHRITIS OF LEFT KNEE: Primary | ICD-10-CM

## 2021-02-03 ENCOUNTER — TELEPHONE (OUTPATIENT)
Dept: ORTHOPEDICS | Facility: CLINIC | Age: 61
End: 2021-02-03

## 2021-02-03 ENCOUNTER — PATIENT MESSAGE (OUTPATIENT)
Dept: ORTHOPEDICS | Facility: CLINIC | Age: 61
End: 2021-02-03

## 2021-02-09 DIAGNOSIS — M17.12 PRIMARY OSTEOARTHRITIS OF LEFT KNEE: Primary | ICD-10-CM

## 2021-02-10 ENCOUNTER — OFFICE VISIT (OUTPATIENT)
Dept: ORTHOPEDICS | Facility: CLINIC | Age: 61
End: 2021-02-10
Payer: COMMERCIAL

## 2021-02-10 VITALS — WEIGHT: 121 LBS | BODY MASS INDEX: 23.75 KG/M2 | HEIGHT: 60 IN | RESPIRATION RATE: 18 BRPM

## 2021-02-10 DIAGNOSIS — M17.12 PRIMARY OSTEOARTHRITIS OF LEFT KNEE: Primary | ICD-10-CM

## 2021-02-10 PROCEDURE — 99999 PR PBB SHADOW E&M-EST. PATIENT-LVL III: CPT | Mod: PBBFAC,,, | Performed by: ORTHOPAEDIC SURGERY

## 2021-02-10 PROCEDURE — 1125F AMNT PAIN NOTED PAIN PRSNT: CPT | Mod: S$GLB,,, | Performed by: ORTHOPAEDIC SURGERY

## 2021-02-10 PROCEDURE — 99499 UNLISTED E&M SERVICE: CPT | Mod: S$GLB,,, | Performed by: ORTHOPAEDIC SURGERY

## 2021-02-10 PROCEDURE — 3008F PR BODY MASS INDEX (BMI) DOCUMENTED: ICD-10-PCS | Mod: CPTII,S$GLB,, | Performed by: ORTHOPAEDIC SURGERY

## 2021-02-10 PROCEDURE — 99999 PR PBB SHADOW E&M-EST. PATIENT-LVL III: ICD-10-PCS | Mod: PBBFAC,,, | Performed by: ORTHOPAEDIC SURGERY

## 2021-02-10 PROCEDURE — 1125F PR PAIN SEVERITY QUANTIFIED, PAIN PRESENT: ICD-10-PCS | Mod: S$GLB,,, | Performed by: ORTHOPAEDIC SURGERY

## 2021-02-10 PROCEDURE — 20610 LARGE JOINT ASPIRATION/INJECTION: L KNEE: ICD-10-PCS | Mod: 58,LT,S$GLB, | Performed by: ORTHOPAEDIC SURGERY

## 2021-02-10 PROCEDURE — 3008F BODY MASS INDEX DOCD: CPT | Mod: CPTII,S$GLB,, | Performed by: ORTHOPAEDIC SURGERY

## 2021-02-10 PROCEDURE — 99499 NO LOS: ICD-10-PCS | Mod: S$GLB,,, | Performed by: ORTHOPAEDIC SURGERY

## 2021-02-10 PROCEDURE — 20610 DRAIN/INJ JOINT/BURSA W/O US: CPT | Mod: 58,LT,S$GLB, | Performed by: ORTHOPAEDIC SURGERY

## 2021-03-02 ENCOUNTER — CLINICAL SUPPORT (OUTPATIENT)
Dept: URGENT CARE | Facility: CLINIC | Age: 61
End: 2021-03-02
Payer: COMMERCIAL

## 2021-03-02 VITALS — TEMPERATURE: 98 F

## 2021-03-02 DIAGNOSIS — Z20.822 EXPOSURE TO COVID-19 VIRUS: Primary | ICD-10-CM

## 2021-03-02 LAB
CTP QC/QA: YES
SARS-COV-2 RDRP RESP QL NAA+PROBE: NEGATIVE

## 2021-03-02 PROCEDURE — U0002: ICD-10-PCS | Mod: QW,S$GLB,, | Performed by: PHYSICIAN ASSISTANT

## 2021-03-02 PROCEDURE — U0002 COVID-19 LAB TEST NON-CDC: HCPCS | Mod: QW,S$GLB,, | Performed by: PHYSICIAN ASSISTANT

## 2021-03-25 ENCOUNTER — IMMUNIZATION (OUTPATIENT)
Dept: FAMILY MEDICINE | Facility: CLINIC | Age: 61
End: 2021-03-25
Payer: COMMERCIAL

## 2021-03-25 DIAGNOSIS — Z23 NEED FOR VACCINATION: Primary | ICD-10-CM

## 2021-03-25 PROCEDURE — 0001A COVID-19, MRNA, LNP-S, PF, 30 MCG/0.3 ML DOSE VACCINE: CPT | Mod: CV19,,, | Performed by: INTERNAL MEDICINE

## 2021-03-25 PROCEDURE — 91300 COVID-19, MRNA, LNP-S, PF, 30 MCG/0.3 ML DOSE VACCINE: ICD-10-PCS | Mod: ,,, | Performed by: INTERNAL MEDICINE

## 2021-03-25 PROCEDURE — 91300 COVID-19, MRNA, LNP-S, PF, 30 MCG/0.3 ML DOSE VACCINE: CPT | Mod: ,,, | Performed by: INTERNAL MEDICINE

## 2021-03-25 PROCEDURE — 0001A COVID-19, MRNA, LNP-S, PF, 30 MCG/0.3 ML DOSE VACCINE: ICD-10-PCS | Mod: CV19,,, | Performed by: INTERNAL MEDICINE

## 2021-04-15 ENCOUNTER — IMMUNIZATION (OUTPATIENT)
Dept: FAMILY MEDICINE | Facility: CLINIC | Age: 61
End: 2021-04-15
Payer: COMMERCIAL

## 2021-04-15 DIAGNOSIS — Z23 NEED FOR VACCINATION: Primary | ICD-10-CM

## 2021-04-15 PROCEDURE — 0002A COVID-19, MRNA, LNP-S, PF, 30 MCG/0.3 ML DOSE VACCINE: CPT | Mod: PBBFAC | Performed by: FAMILY MEDICINE

## 2021-04-15 PROCEDURE — 91300 COVID-19, MRNA, LNP-S, PF, 30 MCG/0.3 ML DOSE VACCINE: CPT | Mod: PBBFAC | Performed by: FAMILY MEDICINE

## 2021-07-06 ENCOUNTER — TELEPHONE (OUTPATIENT)
Dept: FAMILY MEDICINE | Facility: CLINIC | Age: 61
End: 2021-07-06

## 2021-07-09 ENCOUNTER — PATIENT MESSAGE (OUTPATIENT)
Dept: ADMINISTRATIVE | Facility: HOSPITAL | Age: 61
End: 2021-07-09

## 2021-07-09 ENCOUNTER — PATIENT OUTREACH (OUTPATIENT)
Dept: ADMINISTRATIVE | Facility: HOSPITAL | Age: 61
End: 2021-07-09

## 2021-07-19 ENCOUNTER — TELEPHONE (OUTPATIENT)
Dept: FAMILY MEDICINE | Facility: CLINIC | Age: 61
End: 2021-07-19

## 2021-08-09 ENCOUNTER — OFFICE VISIT (OUTPATIENT)
Dept: FAMILY MEDICINE | Facility: CLINIC | Age: 61
End: 2021-08-09
Payer: COMMERCIAL

## 2021-08-09 VITALS
OXYGEN SATURATION: 98 % | DIASTOLIC BLOOD PRESSURE: 82 MMHG | WEIGHT: 122.25 LBS | HEART RATE: 72 BPM | RESPIRATION RATE: 12 BRPM | BODY MASS INDEX: 24 KG/M2 | TEMPERATURE: 98 F | HEIGHT: 60 IN | SYSTOLIC BLOOD PRESSURE: 120 MMHG

## 2021-08-09 DIAGNOSIS — Z00.00 WELL ADULT EXAM: Primary | ICD-10-CM

## 2021-08-09 DIAGNOSIS — Z23 NEED FOR SHINGLES VACCINE: ICD-10-CM

## 2021-08-09 DIAGNOSIS — Z23 NEED FOR DIPHTHERIA-TETANUS-PERTUSSIS (TDAP) VACCINE: ICD-10-CM

## 2021-08-09 DIAGNOSIS — N94.10 DYSPAREUNIA, FEMALE: ICD-10-CM

## 2021-08-09 DIAGNOSIS — R07.9 CHEST PAIN, UNSPECIFIED TYPE: ICD-10-CM

## 2021-08-09 DIAGNOSIS — Z12.31 ENCOUNTER FOR SCREENING MAMMOGRAM FOR MALIGNANT NEOPLASM OF BREAST: ICD-10-CM

## 2021-08-09 DIAGNOSIS — Z12.11 SCREEN FOR COLON CANCER: ICD-10-CM

## 2021-08-09 PROCEDURE — 87389 HIV-1 AG W/HIV-1&-2 AB AG IA: CPT | Performed by: INTERNAL MEDICINE

## 2021-08-09 PROCEDURE — 99386 PREV VISIT NEW AGE 40-64: CPT | Mod: 25,S$GLB,, | Performed by: INTERNAL MEDICINE

## 2021-08-09 PROCEDURE — 90715 TDAP VACCINE 7 YRS/> IM: CPT | Mod: S$GLB,,, | Performed by: INTERNAL MEDICINE

## 2021-08-09 PROCEDURE — 84443 ASSAY THYROID STIM HORMONE: CPT | Performed by: INTERNAL MEDICINE

## 2021-08-09 PROCEDURE — 93010 ELECTROCARDIOGRAM REPORT: CPT | Mod: S$GLB,,, | Performed by: INTERNAL MEDICINE

## 2021-08-09 PROCEDURE — 83540 ASSAY OF IRON: CPT | Performed by: INTERNAL MEDICINE

## 2021-08-09 PROCEDURE — 3074F SYST BP LT 130 MM HG: CPT | Mod: CPTII,S$GLB,, | Performed by: INTERNAL MEDICINE

## 2021-08-09 PROCEDURE — 1126F AMNT PAIN NOTED NONE PRSNT: CPT | Mod: CPTII,S$GLB,, | Performed by: INTERNAL MEDICINE

## 2021-08-09 PROCEDURE — 99386 PR PREVENTIVE VISIT,NEW,40-64: ICD-10-PCS | Mod: 25,S$GLB,, | Performed by: INTERNAL MEDICINE

## 2021-08-09 PROCEDURE — 82728 ASSAY OF FERRITIN: CPT | Performed by: INTERNAL MEDICINE

## 2021-08-09 PROCEDURE — 3008F BODY MASS INDEX DOCD: CPT | Mod: CPTII,S$GLB,, | Performed by: INTERNAL MEDICINE

## 2021-08-09 PROCEDURE — 3079F PR MOST RECENT DIASTOLIC BLOOD PRESSURE 80-89 MM HG: ICD-10-PCS | Mod: CPTII,S$GLB,, | Performed by: INTERNAL MEDICINE

## 2021-08-09 PROCEDURE — 3079F DIAST BP 80-89 MM HG: CPT | Mod: CPTII,S$GLB,, | Performed by: INTERNAL MEDICINE

## 2021-08-09 PROCEDURE — 80061 LIPID PANEL: CPT | Performed by: INTERNAL MEDICINE

## 2021-08-09 PROCEDURE — 1159F PR MEDICATION LIST DOCUMENTED IN MEDICAL RECORD: ICD-10-PCS | Mod: CPTII,S$GLB,, | Performed by: INTERNAL MEDICINE

## 2021-08-09 PROCEDURE — 1160F PR REVIEW ALL MEDS BY PRESCRIBER/CLIN PHARMACIST DOCUMENTED: ICD-10-PCS | Mod: CPTII,S$GLB,, | Performed by: INTERNAL MEDICINE

## 2021-08-09 PROCEDURE — 90715 TDAP VACCINE GREATER THAN OR EQUAL TO 7YO IM: ICD-10-PCS | Mod: S$GLB,,, | Performed by: INTERNAL MEDICINE

## 2021-08-09 PROCEDURE — 3074F PR MOST RECENT SYSTOLIC BLOOD PRESSURE < 130 MM HG: ICD-10-PCS | Mod: CPTII,S$GLB,, | Performed by: INTERNAL MEDICINE

## 2021-08-09 PROCEDURE — 93010 EKG 12-LEAD: ICD-10-PCS | Mod: S$GLB,,, | Performed by: INTERNAL MEDICINE

## 2021-08-09 PROCEDURE — 90471 IMMUNIZATION ADMIN: CPT | Mod: S$GLB,,, | Performed by: INTERNAL MEDICINE

## 2021-08-09 PROCEDURE — 93005 EKG 12-LEAD: ICD-10-PCS | Mod: S$GLB,,, | Performed by: INTERNAL MEDICINE

## 2021-08-09 PROCEDURE — 1159F MED LIST DOCD IN RCRD: CPT | Mod: CPTII,S$GLB,, | Performed by: INTERNAL MEDICINE

## 2021-08-09 PROCEDURE — 36415 COLL VENOUS BLD VENIPUNCTURE: CPT | Performed by: INTERNAL MEDICINE

## 2021-08-09 PROCEDURE — 80053 COMPREHEN METABOLIC PANEL: CPT | Performed by: INTERNAL MEDICINE

## 2021-08-09 PROCEDURE — 86803 HEPATITIS C AB TEST: CPT | Performed by: INTERNAL MEDICINE

## 2021-08-09 PROCEDURE — 93005 ELECTROCARDIOGRAM TRACING: CPT | Mod: S$GLB,,, | Performed by: INTERNAL MEDICINE

## 2021-08-09 PROCEDURE — 1126F PR PAIN SEVERITY QUANTIFIED, NO PAIN PRESENT: ICD-10-PCS | Mod: CPTII,S$GLB,, | Performed by: INTERNAL MEDICINE

## 2021-08-09 PROCEDURE — 3008F PR BODY MASS INDEX (BMI) DOCUMENTED: ICD-10-PCS | Mod: CPTII,S$GLB,, | Performed by: INTERNAL MEDICINE

## 2021-08-09 PROCEDURE — 1160F RVW MEDS BY RX/DR IN RCRD: CPT | Mod: CPTII,S$GLB,, | Performed by: INTERNAL MEDICINE

## 2021-08-09 PROCEDURE — 85025 COMPLETE CBC W/AUTO DIFF WBC: CPT | Performed by: INTERNAL MEDICINE

## 2021-08-09 PROCEDURE — 90471 TDAP VACCINE GREATER THAN OR EQUAL TO 7YO IM: ICD-10-PCS | Mod: S$GLB,,, | Performed by: INTERNAL MEDICINE

## 2021-08-09 RX ORDER — ZOSTER VACCINE RECOMBINANT, ADJUVANTED 50 MCG/0.5
0.5 KIT INTRAMUSCULAR ONCE
Qty: 1 EACH | Refills: 0 | Status: SHIPPED | OUTPATIENT
Start: 2021-08-09 | End: 2021-08-09

## 2021-08-10 ENCOUNTER — TELEPHONE (OUTPATIENT)
Dept: FAMILY MEDICINE | Facility: CLINIC | Age: 61
End: 2021-08-10

## 2021-08-10 DIAGNOSIS — R76.8 POSITIVE HEPATITIS C ANTIBODY TEST: Primary | ICD-10-CM

## 2021-08-10 LAB
ALBUMIN SERPL BCP-MCNC: 4.1 G/DL (ref 3.5–5.2)
ALP SERPL-CCNC: 71 U/L (ref 55–135)
ALT SERPL W/O P-5'-P-CCNC: 23 U/L (ref 10–44)
ANION GAP SERPL CALC-SCNC: 12 MMOL/L (ref 8–16)
AST SERPL-CCNC: 26 U/L (ref 10–40)
BASOPHILS # BLD AUTO: 0.07 K/UL (ref 0–0.2)
BASOPHILS NFR BLD: 1.4 % (ref 0–1.9)
BILIRUB SERPL-MCNC: 0.8 MG/DL (ref 0.1–1)
BUN SERPL-MCNC: 15 MG/DL (ref 6–20)
CALCIUM SERPL-MCNC: 9.7 MG/DL (ref 8.7–10.5)
CHLORIDE SERPL-SCNC: 103 MMOL/L (ref 95–110)
CHOLEST SERPL-MCNC: 237 MG/DL (ref 120–199)
CHOLEST/HDLC SERPL: 3.2 {RATIO} (ref 2–5)
CO2 SERPL-SCNC: 23 MMOL/L (ref 23–29)
CREAT SERPL-MCNC: 0.7 MG/DL (ref 0.5–1.4)
DIFFERENTIAL METHOD: NORMAL
EOSINOPHIL # BLD AUTO: 0.2 K/UL (ref 0–0.5)
EOSINOPHIL NFR BLD: 3.1 % (ref 0–8)
ERYTHROCYTE [DISTWIDTH] IN BLOOD BY AUTOMATED COUNT: 12.2 % (ref 11.5–14.5)
EST. GFR  (AFRICAN AMERICAN): >60 ML/MIN/1.73 M^2
EST. GFR  (NON AFRICAN AMERICAN): >60 ML/MIN/1.73 M^2
FERRITIN SERPL-MCNC: 209 NG/ML (ref 20–300)
GLUCOSE SERPL-MCNC: 81 MG/DL (ref 70–110)
HCT VFR BLD AUTO: 42.1 % (ref 37–48.5)
HCV AB SERPL QL IA: POSITIVE
HDLC SERPL-MCNC: 74 MG/DL (ref 40–75)
HDLC SERPL: 31.2 % (ref 20–50)
HGB BLD-MCNC: 13.6 G/DL (ref 12–16)
HIV 1+2 AB+HIV1 P24 AG SERPL QL IA: NEGATIVE
IMM GRANULOCYTES # BLD AUTO: 0.01 K/UL (ref 0–0.04)
IMM GRANULOCYTES NFR BLD AUTO: 0.2 % (ref 0–0.5)
IRON SERPL-MCNC: 91 UG/DL (ref 30–160)
LDLC SERPL CALC-MCNC: 140.6 MG/DL (ref 63–159)
LYMPHOCYTES # BLD AUTO: 1.7 K/UL (ref 1–4.8)
LYMPHOCYTES NFR BLD: 35.6 % (ref 18–48)
MCH RBC QN AUTO: 30.2 PG (ref 27–31)
MCHC RBC AUTO-ENTMCNC: 32.3 G/DL (ref 32–36)
MCV RBC AUTO: 93 FL (ref 82–98)
MONOCYTES # BLD AUTO: 0.3 K/UL (ref 0.3–1)
MONOCYTES NFR BLD: 6.2 % (ref 4–15)
NEUTROPHILS # BLD AUTO: 2.6 K/UL (ref 1.8–7.7)
NEUTROPHILS NFR BLD: 53.5 % (ref 38–73)
NONHDLC SERPL-MCNC: 163 MG/DL
NRBC BLD-RTO: 0 /100 WBC
PLATELET # BLD AUTO: 213 K/UL (ref 150–450)
PMV BLD AUTO: 11.3 FL (ref 9.2–12.9)
POTASSIUM SERPL-SCNC: 4 MMOL/L (ref 3.5–5.1)
PROT SERPL-MCNC: 7.1 G/DL (ref 6–8.4)
RBC # BLD AUTO: 4.51 M/UL (ref 4–5.4)
SATURATED IRON: 29 % (ref 20–50)
SODIUM SERPL-SCNC: 138 MMOL/L (ref 136–145)
TOTAL IRON BINDING CAPACITY: 312 UG/DL (ref 250–450)
TRANSFERRIN SERPL-MCNC: 211 MG/DL (ref 200–375)
TRIGL SERPL-MCNC: 112 MG/DL (ref 30–150)
TSH SERPL DL<=0.005 MIU/L-ACNC: 1.35 UIU/ML (ref 0.4–4)
WBC # BLD AUTO: 4.83 K/UL (ref 3.9–12.7)

## 2021-08-11 ENCOUNTER — LAB VISIT (OUTPATIENT)
Dept: LAB | Facility: HOSPITAL | Age: 61
End: 2021-08-11
Attending: INTERNAL MEDICINE
Payer: COMMERCIAL

## 2021-08-11 DIAGNOSIS — Z12.11 SCREEN FOR COLON CANCER: ICD-10-CM

## 2021-08-11 PROCEDURE — 82274 ASSAY TEST FOR BLOOD FECAL: CPT | Performed by: INTERNAL MEDICINE

## 2021-08-18 LAB — HEMOCCULT STL QL IA: NEGATIVE

## 2021-08-19 ENCOUNTER — TELEPHONE (OUTPATIENT)
Dept: FAMILY MEDICINE | Facility: CLINIC | Age: 61
End: 2021-08-19

## 2022-07-20 ENCOUNTER — PATIENT MESSAGE (OUTPATIENT)
Dept: ADMINISTRATIVE | Facility: HOSPITAL | Age: 62
End: 2022-07-20
Payer: COMMERCIAL

## 2022-08-26 DIAGNOSIS — Z12.31 OTHER SCREENING MAMMOGRAM: ICD-10-CM

## 2022-08-29 ENCOUNTER — PATIENT MESSAGE (OUTPATIENT)
Dept: ADMINISTRATIVE | Facility: HOSPITAL | Age: 62
End: 2022-08-29
Payer: COMMERCIAL

## 2022-08-29 ENCOUNTER — PATIENT OUTREACH (OUTPATIENT)
Dept: ADMINISTRATIVE | Facility: HOSPITAL | Age: 62
End: 2022-08-29
Payer: COMMERCIAL

## 2022-08-29 DIAGNOSIS — Z12.11 SCREEN FOR COLON CANCER: Primary | ICD-10-CM

## 2022-08-29 NOTE — LETTER
September 6, 2022    Jess Gresham  [facility] [address]             [facility]  [address]  [facility] [address]  Phone: [phone] Dear Jess:     You have been selected to receive a new at-home colon cancer screening that could save your life.      The Fit Kit colon cancer screening takes just minutes and is a simple and effective method for detecting signs of colon cancer. Youll do the test at home and return it by mail in a pre-paid envelope.  If you had a Colon Cancer Screening done at another facility, please let your primary care provider know so that they can update your health record.  Please send a message to your primary care physician via [email or web address] or contact his/her office at [phone]. If you have a copy of these records, please provide a copy so that we may update your records.  Also, you are welcome to request that the report be faxed to us at ([phone]) or upload the report to your [facility] portal.       Dont pass up this easy alternative to a Colonoscopy - its a great way to gain peace of mind!      If you have an upcoming visit with your Primary Care Physician,  you can  a Fit Kit during your visit.   If you don't have an upcoming appointment,   please send a message via [email or web address] OR BY CALLING OUR OFFICE [phone] requesting that a FIT KIT be mailed to your home!        Regular colorectal cancer screening is one of the most powerful weapons for preventing Colon Cancer.      Your [facility]  Samra Benton, DO

## 2022-08-29 NOTE — PROGRESS NOTES
WEEKLY BULK ORDER REPORT.  ACTIVE PATIENT PORTAL MESSAGE     BREAST CANCER SCREENING  Outreach to patient in reference to SCHEDULING A MAMMOGRAM EXAM.     Chart review completed:   Care Everywhere and Media reports - updates requested and reviewed.      COLON CANCER SCREENING  Non-compliant report chart audits for COLON CANCER SCREENING for Patients      Fit kit order placed

## 2022-10-10 ENCOUNTER — PATIENT MESSAGE (OUTPATIENT)
Dept: ADMINISTRATIVE | Facility: HOSPITAL | Age: 62
End: 2022-10-10
Payer: COMMERCIAL

## 2023-01-09 ENCOUNTER — PATIENT MESSAGE (OUTPATIENT)
Dept: FAMILY MEDICINE | Facility: CLINIC | Age: 63
End: 2023-01-09
Payer: COMMERCIAL

## 2023-01-09 ENCOUNTER — TELEPHONE (OUTPATIENT)
Dept: FAMILY MEDICINE | Facility: CLINIC | Age: 63
End: 2023-01-09
Payer: COMMERCIAL

## 2023-01-09 NOTE — TELEPHONE ENCOUNTER
Appointment made on Thursday 01/12/2023 at 8:20 am. LVM and sent message via [employer] for pt to let us know if appointment time does not work and we can reschedule.

## 2023-01-12 ENCOUNTER — CLINICAL SUPPORT (OUTPATIENT)
Dept: CARDIOLOGY | Facility: HOSPITAL | Age: 63
End: 2023-01-12
Attending: INTERNAL MEDICINE
Payer: COMMERCIAL

## 2023-01-12 ENCOUNTER — OFFICE VISIT (OUTPATIENT)
Dept: FAMILY MEDICINE | Facility: CLINIC | Age: 63
End: 2023-01-12
Payer: COMMERCIAL

## 2023-01-12 VITALS — BODY MASS INDEX: 23.75 KG/M2 | HEIGHT: 60 IN | WEIGHT: 121 LBS

## 2023-01-12 VITALS
TEMPERATURE: 98 F | HEART RATE: 63 BPM | HEIGHT: 60 IN | SYSTOLIC BLOOD PRESSURE: 116 MMHG | BODY MASS INDEX: 23.87 KG/M2 | OXYGEN SATURATION: 97 % | DIASTOLIC BLOOD PRESSURE: 88 MMHG | WEIGHT: 121.56 LBS | RESPIRATION RATE: 16 BRPM

## 2023-01-12 DIAGNOSIS — Z12.11 SCREEN FOR COLON CANCER: ICD-10-CM

## 2023-01-12 DIAGNOSIS — R42 LIGHTHEADEDNESS: ICD-10-CM

## 2023-01-12 DIAGNOSIS — R07.9 CHEST PAIN, UNSPECIFIED TYPE: ICD-10-CM

## 2023-01-12 DIAGNOSIS — Z00.00 WELL ADULT EXAM: Primary | ICD-10-CM

## 2023-01-12 DIAGNOSIS — F41.9 ANXIETY: ICD-10-CM

## 2023-01-12 DIAGNOSIS — Z12.31 ENCOUNTER FOR SCREENING MAMMOGRAM FOR MALIGNANT NEOPLASM OF BREAST: ICD-10-CM

## 2023-01-12 LAB
ASCENDING AORTA: 3.81 CM
AV INDEX (PROSTH): 0.71
AV MEAN GRADIENT: 3 MMHG
AV PEAK GRADIENT: 4 MMHG
AV VALVE AREA: 2.59 CM2
AV VELOCITY RATIO: 0.72
BASOPHILS # BLD AUTO: 0.07 K/UL (ref 0–0.2)
BASOPHILS NFR BLD: 1.5 % (ref 0–1.9)
BSA FOR ECHO PROCEDURE: 1.52 M2
CV ECHO LV RWT: 0.35 CM
DIFFERENTIAL METHOD: NORMAL
DOP CALC AO PEAK VEL: 1.06 M/S
DOP CALC AO VTI: 24.6 CM
DOP CALC LVOT AREA: 3.7 CM2
DOP CALC LVOT DIAMETER: 2.16 CM
DOP CALC LVOT PEAK VEL: 0.76 M/S
DOP CALC LVOT STROKE VOLUME: 63.73 CM3
DOP CALCLVOT PEAK VEL VTI: 17.4 CM
E WAVE DECELERATION TIME: 352.55 MSEC
E/A RATIO: 0.82
E/E' RATIO: 5.53 M/S
ECHO LV POSTERIOR WALL: 0.83 CM (ref 0.6–1.1)
EJECTION FRACTION: 55 %
EOSINOPHIL # BLD AUTO: 0.2 K/UL (ref 0–0.5)
EOSINOPHIL NFR BLD: 4.4 % (ref 0–8)
ERYTHROCYTE [DISTWIDTH] IN BLOOD BY AUTOMATED COUNT: 12.1 % (ref 11.5–14.5)
FRACTIONAL SHORTENING: 30 % (ref 28–44)
HCT VFR BLD AUTO: 43.5 % (ref 37–48.5)
HGB BLD-MCNC: 14.1 G/DL (ref 12–16)
IMM GRANULOCYTES # BLD AUTO: 0 K/UL (ref 0–0.04)
IMM GRANULOCYTES NFR BLD AUTO: 0 % (ref 0–0.5)
INTERVENTRICULAR SEPTUM: 0.73 CM (ref 0.6–1.1)
LA MAJOR: 4.86 CM
LA MINOR: 5.15 CM
LA WIDTH: 3.5 CM
LEFT ATRIUM SIZE: 3.78 CM
LEFT ATRIUM VOLUME INDEX: 37.2 ML/M2
LEFT ATRIUM VOLUME: 56.24 CM3
LEFT INTERNAL DIMENSION IN SYSTOLE: 3.36 CM (ref 2.1–4)
LEFT VENTRICLE DIASTOLIC VOLUME INDEX: 70.03 ML/M2
LEFT VENTRICLE DIASTOLIC VOLUME: 105.75 ML
LEFT VENTRICLE MASS INDEX: 80 G/M2
LEFT VENTRICLE SYSTOLIC VOLUME INDEX: 30.5 ML/M2
LEFT VENTRICLE SYSTOLIC VOLUME: 45.98 ML
LEFT VENTRICULAR INTERNAL DIMENSION IN DIASTOLE: 4.77 CM (ref 3.5–6)
LEFT VENTRICULAR MASS: 121.33 G
LV LATERAL E/E' RATIO: 5.22 M/S
LV SEPTAL E/E' RATIO: 5.88 M/S
LVOT MG: 1.13 MMHG
LVOT MV: 0.48 CM/S
LYMPHOCYTES # BLD AUTO: 1.9 K/UL (ref 1–4.8)
LYMPHOCYTES NFR BLD: 39.8 % (ref 18–48)
MCH RBC QN AUTO: 30.9 PG (ref 27–31)
MCHC RBC AUTO-ENTMCNC: 32.4 G/DL (ref 32–36)
MCV RBC AUTO: 95 FL (ref 82–98)
MONOCYTES # BLD AUTO: 0.3 K/UL (ref 0.3–1)
MONOCYTES NFR BLD: 6.8 % (ref 4–15)
MV PEAK A VEL: 0.57 M/S
MV PEAK E VEL: 0.47 M/S
NEUTROPHILS # BLD AUTO: 2.2 K/UL (ref 1.8–7.7)
NEUTROPHILS NFR BLD: 47.5 % (ref 38–73)
NRBC BLD-RTO: 0 /100 WBC
PISA TR MAX VEL: 2.11 M/S
PLATELET # BLD AUTO: 208 K/UL (ref 150–450)
PMV BLD AUTO: 11 FL (ref 9.2–12.9)
PULM VEIN S/D RATIO: 1.61
PV PEAK D VEL: 0.36 M/S
PV PEAK S VEL: 0.58 M/S
RA MAJOR: 3.93 CM
RA PRESSURE: 3 MMHG
RBC # BLD AUTO: 4.57 M/UL (ref 4–5.4)
RIGHT VENTRICULAR END-DIASTOLIC DIMENSION: 4.02 CM
RIGHT VENTRICULAR LENGTH IN DIASTOLE (APICAL 4-CHAMBER VIEW): 5.55 CM
RV MID DIAMA: 21.91 CM
RV TISSUE DOPPLER FREE WALL SYSTOLIC VELOCITY 1 (APICAL 4 CHAMBER VIEW): 0.01 CM/S
SINUS: 3.37 CM
STJ: 3.48 CM
TDI LATERAL: 0.09 M/S
TDI SEPTAL: 0.08 M/S
TDI: 0.09 M/S
TR MAX PG: 18 MMHG
TRICUSPID ANNULAR PLANE SYSTOLIC EXCURSION: 2.4 CM
TV REST PULMONARY ARTERY PRESSURE: 21 MMHG
WBC # BLD AUTO: 4.72 K/UL (ref 3.9–12.7)

## 2023-01-12 PROCEDURE — 93005 EKG 12-LEAD: ICD-10-PCS | Mod: S$GLB,,, | Performed by: INTERNAL MEDICINE

## 2023-01-12 PROCEDURE — 1159F PR MEDICATION LIST DOCUMENTED IN MEDICAL RECORD: ICD-10-PCS | Mod: CPTII,S$GLB,, | Performed by: INTERNAL MEDICINE

## 2023-01-12 PROCEDURE — 1159F MED LIST DOCD IN RCRD: CPT | Mod: CPTII,S$GLB,, | Performed by: INTERNAL MEDICINE

## 2023-01-12 PROCEDURE — 3074F SYST BP LT 130 MM HG: CPT | Mod: CPTII,S$GLB,, | Performed by: INTERNAL MEDICINE

## 2023-01-12 PROCEDURE — 99396 PREV VISIT EST AGE 40-64: CPT | Mod: S$GLB,,, | Performed by: INTERNAL MEDICINE

## 2023-01-12 PROCEDURE — 1160F RVW MEDS BY RX/DR IN RCRD: CPT | Mod: CPTII,S$GLB,, | Performed by: INTERNAL MEDICINE

## 2023-01-12 PROCEDURE — 3008F BODY MASS INDEX DOCD: CPT | Mod: CPTII,S$GLB,, | Performed by: INTERNAL MEDICINE

## 2023-01-12 PROCEDURE — 99396 PR PREVENTIVE VISIT,EST,40-64: ICD-10-PCS | Mod: S$GLB,,, | Performed by: INTERNAL MEDICINE

## 2023-01-12 PROCEDURE — 3079F DIAST BP 80-89 MM HG: CPT | Mod: CPTII,S$GLB,, | Performed by: INTERNAL MEDICINE

## 2023-01-12 PROCEDURE — 84443 ASSAY THYROID STIM HORMONE: CPT | Performed by: INTERNAL MEDICINE

## 2023-01-12 PROCEDURE — 93306 TTE W/DOPPLER COMPLETE: CPT | Mod: 26,,, | Performed by: INTERNAL MEDICINE

## 2023-01-12 PROCEDURE — 80053 COMPREHEN METABOLIC PANEL: CPT | Performed by: INTERNAL MEDICINE

## 2023-01-12 PROCEDURE — 93010 EKG 12-LEAD: ICD-10-PCS | Mod: S$GLB,,, | Performed by: INTERNAL MEDICINE

## 2023-01-12 PROCEDURE — 3079F PR MOST RECENT DIASTOLIC BLOOD PRESSURE 80-89 MM HG: ICD-10-PCS | Mod: CPTII,S$GLB,, | Performed by: INTERNAL MEDICINE

## 2023-01-12 PROCEDURE — 93010 ELECTROCARDIOGRAM REPORT: CPT | Mod: S$GLB,,, | Performed by: INTERNAL MEDICINE

## 2023-01-12 PROCEDURE — 36415 PR COLLECTION VENOUS BLOOD,VENIPUNCTURE: ICD-10-PCS | Mod: S$GLB,,, | Performed by: INTERNAL MEDICINE

## 2023-01-12 PROCEDURE — 3008F PR BODY MASS INDEX (BMI) DOCUMENTED: ICD-10-PCS | Mod: CPTII,S$GLB,, | Performed by: INTERNAL MEDICINE

## 2023-01-12 PROCEDURE — 80061 LIPID PANEL: CPT | Performed by: INTERNAL MEDICINE

## 2023-01-12 PROCEDURE — 93306 TTE W/DOPPLER COMPLETE: CPT | Mod: PO

## 2023-01-12 PROCEDURE — 93005 ELECTROCARDIOGRAM TRACING: CPT | Mod: S$GLB,,, | Performed by: INTERNAL MEDICINE

## 2023-01-12 PROCEDURE — 83036 HEMOGLOBIN GLYCOSYLATED A1C: CPT | Performed by: INTERNAL MEDICINE

## 2023-01-12 PROCEDURE — 85025 COMPLETE CBC W/AUTO DIFF WBC: CPT | Performed by: INTERNAL MEDICINE

## 2023-01-12 PROCEDURE — 3074F PR MOST RECENT SYSTOLIC BLOOD PRESSURE < 130 MM HG: ICD-10-PCS | Mod: CPTII,S$GLB,, | Performed by: INTERNAL MEDICINE

## 2023-01-12 PROCEDURE — 36415 COLL VENOUS BLD VENIPUNCTURE: CPT | Mod: S$GLB,,, | Performed by: INTERNAL MEDICINE

## 2023-01-12 PROCEDURE — 93306 ECHO (CUPID ONLY): ICD-10-PCS | Mod: 26,,, | Performed by: INTERNAL MEDICINE

## 2023-01-12 PROCEDURE — 1160F PR REVIEW ALL MEDS BY PRESCRIBER/CLIN PHARMACIST DOCUMENTED: ICD-10-PCS | Mod: CPTII,S$GLB,, | Performed by: INTERNAL MEDICINE

## 2023-01-12 NOTE — PROGRESS NOTES
"Subjective:       Patient ID: Jess Gresham is a 62 y.o. female.        Chief Complaint: Dizziness  She is here today for her annual exam and to discuss recurrent chest pain/lightheadedness.     She has no chronic medical issues and takes no medication.     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  She was scheduled to see cardiology but cancelled because her symptoms improved and she has no events for over 18 months.  She started again with recurrent episodes about a week ago and all occurred after exercise.  The first episode was on 1/4/2023 and occurred while driving home after she swam.  Described as chest press "heaviness on the chest", fatigue and pressure behind her eyes. It lasted less than a minute but she had about 2 hrs of feeling "fatigue in my chest" and just not feeling right. Not associated with shortness of breath or palpitations.  She had a second episode on 1/7/2023 that was very similar but it was associated with an intense anxiety and lasted a little longer. She again had about 2 hrs of fatigue after the event.  This week on 1/10/2022 she ran 4 miles and then had a heaviness in her chest with fatigue that lasted for about 2 hrs but was not associated with the severe pain that she previously was getting. Today she is pain free. She is very active---competitive swimming and running. She exercises everyday and has no pain during exercise. Events occur after exercise.      She has dyspareunia due to vaginal pain. She tried multiple treatments in the past of topical estrogens and vagifem without success.  She has had for many years since menopause. In the past she had recurrent UTIs but has not had any recently.  No vaginal d/c or bleeding. Her last pap was on 11/2018 negative with HPV negative.     She does " complain of anxiety and increased stress that is related to work. She denies any depression symptoms. No panic attacks. She is sleeping well. Her anxiety is not associated with her chest pain episodes.      She lives with her  and feels safe. She is very active and exercises regularly with swimming (competitively) and running. She eats health. 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      Colonoscopy---2014 repeat in 10 years (she had done in [place] and is not sure provider)---fecal kit 8/2021 neg   Mammogram----8/2021 neg  Pap-----11/2018 neg   Tdap---8/2021  Influenza vaccine---none  Shingrex vaccine-----8/2021, 12/2021   Covid vaccine--- 2 doses     Review of Systems   Constitutional:  Negative for appetite change, fatigue, fever and unexpected weight change.   HENT:  Negative for congestion, ear pain, hearing loss, sore throat and trouble swallowing.    Eyes:  Negative for pain and visual disturbance.   Respiratory:  Negative for cough, chest tightness, shortness of breath and wheezing.    Cardiovascular:  Negative for chest pain, palpitations and leg swelling.   Gastrointestinal:  Negative for abdominal pain, blood in stool, constipation, diarrhea, nausea and vomiting.   Endocrine: Negative for polyuria.   Genitourinary:  Negative for dysuria and hematuria.   Musculoskeletal:  Negative for arthralgias, back pain and myalgias.   Skin:  Negative for rash.   Neurological:  Positive for light-headedness. Negative for dizziness, weakness, numbness and headaches.   Hematological:  Does not bruise/bleed easily.   Psychiatric/Behavioral:  Negative for dysphoric mood, sleep disturbance and suicidal ideas. The patient is nervous/anxious.      Objective:      Vitals:    01/12/23 0825   BP: 116/88   BP Location: Right arm   Patient Position: Sitting   Pulse: 63   Resp: 16   Temp: 98.2 °F (36.8 °C)   TempSrc: Temporal   SpO2: 97%   Weight: 55.2 kg (121 lb 9.3 oz)   Height: 5' (1.524 m)     Body  mass index is 23.75 kg/m².  Physical Exam    General appearance: No acute distress, cooperative  Eyes: PERRL, EOMI, conjunctiva clear  Ears: normal external ear and pinna, tm clear without drainage, canals clear  Nose: Normal mucosa without drainage  Throat: no exudates or erythema, tonsils not enlarged  Mouth: no sores or lesions, moist mucous membranes  Neck: FROM, soft, supple, no thyromegaly, no bruits  Lymph: no anterior or posterior cervical adenopathy  Heart::  Regular rate and rhythm, no murmur  Lung: Clear to ascultation bilaterally, no wheezing, no rales, no rhonchi, no distress  Abdomen: Soft, nontender, no distention, no hepatosplenomegaly, bowel sounds normal, no guarding, no rebound, no peritoneal signs  Skin: no rashes, no lesions  Extremities: no edema, no cyanosis  Neuro: CN 2-12 intact, 5/5 muscle strength upper and lower extremity bilaterally, 2+ DTRs UE and LE bilaterally, normal gait  Peripheral pulses: 2+ pedal pulses bilaterally, good perfusion and color  Musculoskeletal: FROM, good strenth, no tenderness  Joint: normal appearance, no swelling, no warmth, no deformity in all joints    Assessment:       1. Well adult exam    2. Chest pain, unspecified type    3. Lightheadedness    4. Anxiety    5. Encounter for screening mammogram for malignant neoplasm of breast    6. Screen for colon cancer        Plan:       Well adult exam  She is due for labs, mammogram and colon cancer screening. Recommended colonoscopy but she wants to do a fecal kit at this time. She refused influenza vaccine today.   -     CBC Auto Differential  -     Comprehensive Metabolic Panel  -     Lipid Panel  -     TSH  -     Hemoglobin A1C    Chest pain with lightheadedness  She has recurrent episodes of these concerning events.  I suspect vasospasm. Will get echo and stress test. Refer to cardiology for evaluation.   -     Ambulatory referral/consult to Cardiology; Future; Expected date: 01/19/2023  -     IN OFFICE EKG  12-LEAD (to Muse)  -     Echo; Future  -     Nuclear Stress - Cardiology Interpreted; Future    Anxiety  Mild and work related. Continue to monitor. We did discuss possible treatment but will workup cardiology issue first.     Encounter for screening mammogram for malignant neoplasm of breast  -     Mammo Digital Screening Bilat w/ Sanjay; Future; Expected date: 01/12/2023    Screen for colon cancer  -     Fecal Immunochemical Test (iFOBT); Future; Expected date: 01/12/2023    Follow up in about 1 year (around 1/12/2024) for annual exam.

## 2023-01-13 LAB
ALBUMIN SERPL BCP-MCNC: 4.6 G/DL (ref 3.5–5.2)
ALP SERPL-CCNC: 64 U/L (ref 55–135)
ALT SERPL W/O P-5'-P-CCNC: 22 U/L (ref 10–44)
ANION GAP SERPL CALC-SCNC: 7 MMOL/L (ref 8–16)
AST SERPL-CCNC: 24 U/L (ref 10–40)
BILIRUB SERPL-MCNC: 0.5 MG/DL (ref 0.1–1)
BUN SERPL-MCNC: 14 MG/DL (ref 8–23)
CALCIUM SERPL-MCNC: 10 MG/DL (ref 8.7–10.5)
CHLORIDE SERPL-SCNC: 106 MMOL/L (ref 95–110)
CHOLEST SERPL-MCNC: 281 MG/DL (ref 120–199)
CHOLEST/HDLC SERPL: 3.6 {RATIO} (ref 2–5)
CO2 SERPL-SCNC: 26 MMOL/L (ref 23–29)
CREAT SERPL-MCNC: 0.7 MG/DL (ref 0.5–1.4)
EST. GFR  (NO RACE VARIABLE): >60 ML/MIN/1.73 M^2
ESTIMATED AVG GLUCOSE: 105 MG/DL (ref 68–131)
GLUCOSE SERPL-MCNC: 103 MG/DL (ref 70–110)
HBA1C MFR BLD: 5.3 % (ref 4–5.6)
HDLC SERPL-MCNC: 78 MG/DL (ref 40–75)
HDLC SERPL: 27.8 % (ref 20–50)
LDLC SERPL CALC-MCNC: 188.4 MG/DL (ref 63–159)
NONHDLC SERPL-MCNC: 203 MG/DL
POTASSIUM SERPL-SCNC: 4.3 MMOL/L (ref 3.5–5.1)
PROT SERPL-MCNC: 7.2 G/DL (ref 6–8.4)
SODIUM SERPL-SCNC: 139 MMOL/L (ref 136–145)
TRIGL SERPL-MCNC: 73 MG/DL (ref 30–150)
TSH SERPL DL<=0.005 MIU/L-ACNC: 1.2 UIU/ML (ref 0.4–4)

## 2023-01-18 ENCOUNTER — LAB VISIT (OUTPATIENT)
Dept: LAB | Facility: HOSPITAL | Age: 63
End: 2023-01-18
Attending: INTERNAL MEDICINE
Payer: COMMERCIAL

## 2023-01-18 ENCOUNTER — PATIENT MESSAGE (OUTPATIENT)
Dept: FAMILY MEDICINE | Facility: CLINIC | Age: 63
End: 2023-01-18
Payer: COMMERCIAL

## 2023-01-18 DIAGNOSIS — E78.5 HYPERLIPIDEMIA, UNSPECIFIED HYPERLIPIDEMIA TYPE: Primary | ICD-10-CM

## 2023-01-18 DIAGNOSIS — Z12.11 SCREEN FOR COLON CANCER: ICD-10-CM

## 2023-01-18 PROCEDURE — 82274 ASSAY TEST FOR BLOOD FECAL: CPT | Performed by: INTERNAL MEDICINE

## 2023-01-18 RX ORDER — ATORVASTATIN CALCIUM 20 MG/1
20 TABLET, FILM COATED ORAL DAILY
Qty: 90 TABLET | Refills: 3 | Status: SHIPPED | OUTPATIENT
Start: 2023-01-18 | End: 2023-01-26

## 2023-01-18 NOTE — TELEPHONE ENCOUNTER
Normal liver,kidney and thyroid. Normal blood counts. No diabetes.     Her cholesterol is elevated and known plaque on echo.     Will start atorvastatin 10 mg daily. Recheck lipids in 3 months.     See knows above.  Please call to schedule repeat lipids in 3 months.     Thanks

## 2023-01-18 NOTE — TELEPHONE ENCOUNTER
No new care gaps identified.  [facility] Embedded Care Gaps. Reference number: [id number]. 1/18/2023   1:12:56 PM CST

## 2023-01-23 ENCOUNTER — TELEPHONE (OUTPATIENT)
Dept: FAMILY MEDICINE | Facility: CLINIC | Age: 63
End: 2023-01-23
Payer: COMMERCIAL

## 2023-01-23 NOTE — TELEPHONE ENCOUNTER
She reached out to me about worsening anxiety that is triggered by stress. We had discussed at her visit a couple week ago about starting treatment. She would like to try .    Will start lexapro 5 mg daily    She will get in touch with me in about 2-3 weeks to see how she is doing.

## 2023-01-24 RX ORDER — ESCITALOPRAM OXALATE 5 MG/1
5 TABLET ORAL DAILY
Qty: 30 TABLET | Refills: 11 | Status: SHIPPED | OUTPATIENT
Start: 2023-01-24 | End: 2023-01-26

## 2023-01-26 ENCOUNTER — OFFICE VISIT (OUTPATIENT)
Dept: CARDIOLOGY | Facility: CLINIC | Age: 63
End: 2023-01-26
Payer: COMMERCIAL

## 2023-01-26 VITALS
WEIGHT: 120.56 LBS | HEART RATE: 64 BPM | HEIGHT: 60 IN | DIASTOLIC BLOOD PRESSURE: 88 MMHG | BODY MASS INDEX: 23.67 KG/M2 | SYSTOLIC BLOOD PRESSURE: 147 MMHG

## 2023-01-26 DIAGNOSIS — E78.01 FAMILIAL HYPERCHOLESTEROLEMIA: ICD-10-CM

## 2023-01-26 DIAGNOSIS — I20.89 STABLE ANGINA: Primary | ICD-10-CM

## 2023-01-26 DIAGNOSIS — Z82.49 FAMILY HISTORY OF AORTIC ANEURYSM: ICD-10-CM

## 2023-01-26 DIAGNOSIS — I77.819 ACQUIRED DILATION OF ASCENDING AORTA AND AORTIC ROOT: ICD-10-CM

## 2023-01-26 DIAGNOSIS — I70.0 AORTIC ATHEROSCLEROSIS: ICD-10-CM

## 2023-01-26 DIAGNOSIS — R07.9 CHEST PAIN, UNSPECIFIED TYPE: ICD-10-CM

## 2023-01-26 PROBLEM — I77.810 ACQUIRED DILATION OF ASCENDING AORTA AND AORTIC ROOT: Status: ACTIVE | Noted: 2023-01-26

## 2023-01-26 LAB — HEMOCCULT STL QL IA: NEGATIVE

## 2023-01-26 PROCEDURE — 93010 EKG 12-LEAD: ICD-10-PCS | Mod: S$GLB,,, | Performed by: INTERNAL MEDICINE

## 2023-01-26 PROCEDURE — 3008F BODY MASS INDEX DOCD: CPT | Mod: CPTII,S$GLB,, | Performed by: INTERNAL MEDICINE

## 2023-01-26 PROCEDURE — 3008F PR BODY MASS INDEX (BMI) DOCUMENTED: ICD-10-PCS | Mod: CPTII,S$GLB,, | Performed by: INTERNAL MEDICINE

## 2023-01-26 PROCEDURE — 99999 PR PBB SHADOW E&M-EST. PATIENT-LVL III: CPT | Mod: PBBFAC,,, | Performed by: INTERNAL MEDICINE

## 2023-01-26 PROCEDURE — 99999 PR PBB SHADOW E&M-EST. PATIENT-LVL III: ICD-10-PCS | Mod: PBBFAC,,, | Performed by: INTERNAL MEDICINE

## 2023-01-26 PROCEDURE — 99205 OFFICE O/P NEW HI 60 MIN: CPT | Mod: S$GLB,,, | Performed by: INTERNAL MEDICINE

## 2023-01-26 PROCEDURE — 99205 PR OFFICE/OUTPT VISIT, NEW, LEVL V, 60-74 MIN: ICD-10-PCS | Mod: S$GLB,,, | Performed by: INTERNAL MEDICINE

## 2023-01-26 PROCEDURE — 3079F DIAST BP 80-89 MM HG: CPT | Mod: CPTII,S$GLB,, | Performed by: INTERNAL MEDICINE

## 2023-01-26 PROCEDURE — 3044F PR MOST RECENT HEMOGLOBIN A1C LEVEL <7.0%: ICD-10-PCS | Mod: CPTII,S$GLB,, | Performed by: INTERNAL MEDICINE

## 2023-01-26 PROCEDURE — 3077F PR MOST RECENT SYSTOLIC BLOOD PRESSURE >= 140 MM HG: ICD-10-PCS | Mod: CPTII,S$GLB,, | Performed by: INTERNAL MEDICINE

## 2023-01-26 PROCEDURE — 3079F PR MOST RECENT DIASTOLIC BLOOD PRESSURE 80-89 MM HG: ICD-10-PCS | Mod: CPTII,S$GLB,, | Performed by: INTERNAL MEDICINE

## 2023-01-26 PROCEDURE — 93010 ELECTROCARDIOGRAM REPORT: CPT | Mod: S$GLB,,, | Performed by: INTERNAL MEDICINE

## 2023-01-26 PROCEDURE — 3044F HG A1C LEVEL LT 7.0%: CPT | Mod: CPTII,S$GLB,, | Performed by: INTERNAL MEDICINE

## 2023-01-26 PROCEDURE — 93005 ELECTROCARDIOGRAM TRACING: CPT | Mod: PO

## 2023-01-26 PROCEDURE — 3077F SYST BP >= 140 MM HG: CPT | Mod: CPTII,S$GLB,, | Performed by: INTERNAL MEDICINE

## 2023-01-26 RX ORDER — NAPROXEN SODIUM 220 MG/1
81 TABLET, FILM COATED ORAL DAILY
Qty: 90 TABLET | Refills: 3 | Status: SHIPPED | OUTPATIENT
Start: 2023-01-26 | End: 2023-02-23

## 2023-01-26 RX ORDER — ATORVASTATIN CALCIUM 40 MG/1
40 TABLET, FILM COATED ORAL DAILY
Qty: 90 TABLET | Refills: 3 | Status: SHIPPED | OUTPATIENT
Start: 2023-01-26 | End: 2024-03-21

## 2023-01-26 RX ORDER — NITROGLYCERIN 0.4 MG/1
0.4 TABLET SUBLINGUAL EVERY 5 MIN PRN
Qty: 30 TABLET | Refills: 1 | Status: SHIPPED | OUTPATIENT
Start: 2023-01-26 | End: 2024-01-26

## 2023-01-26 NOTE — PATIENT INSTRUCTIONS
Stress test as ordered by Dr. Benton  Aspirin 81 mg daily, coated   If you have angina requiring sublingual nitroglycerin then please sit down. Take one SL tablet under the tongue and allow it to dissolve over 5 minutes. If the angina does not resolve then take a second tablet. If after another 5 minutes angina does not resolve then take a third tablet and call 911. If angina resolves after 1-2 tablets then call our office the next day unless angina recurs within less than an hour.   Increase atorvastatin to 40 mg once daily  CT scan of your entire aorta  Log your blood pressure at home and send me numbers after 2 weeks of observation  Return in 4 weeks

## 2023-01-26 NOTE — PROGRESS NOTES
Cardiology Clinic Note      Patient: Jess Gresham, 1960, [id number]  Primary Care Provider: Samra Benton DO     Chief Complaint/Reason for Referral: angina     Subjective:       Jess Gresham is a 62 y.o. female who presents for establishing care. unaccompaneid    . Twice this month with chest tightness starting in the neck to the chest, no associated diaphoresis or dyspnea after physical activity. Lasted few minutes. None at rest or night. No syncope. No edema. No orthopnea. No palpitations.    Focused Past History includes:  Severe hypercholesterolemia.    TTE January 2023:  Normal LV size and EF 50 60%.  Mildly dilated ascending aorta at 3.8 (trileaflet aortic valve) with plaque in the descending aorta  Father with CAD age in his 50s. Also with premature AAA  Never smoker; social EtOH; no recreational drugs  No history of cancer, DM, stroke    Review of Systems  Constitutional: negative for fevers, night sweats, and weight loss  Eyes: negative for visual disturbance, diplopia  Respiratory: negative for cough, hemoptysis, sputum, and wheezing  Cardiovascular: see HPI  Gastrointestinal: negative for abdominal pain, bright red blood per rectum, change in bowel habits, dysphagia, melena, and reflux symptoms  Genitourinary:negative for dysuria, frequency, and hematuria  Hematologic/lymphatic: negative for bleeding, easy bruising, and lymphadenopathy  Musculoskeletal:negative for arthralgias, back pain, and myalgias  Neurological: negative for gait problems, paresthesia, speech problems, vertigo, and weakness  Behavioral/Psych: negative for excessive alcohol consumption, illegal drug usage, and sleep disturbance    ----------------------------  Abnormal Pap smear of cervix      Comment:  in early 1990s---cone biopsy which was negative---all                pap normal since   ----------------------------  Chondroplasty      Comment:  Procedure: CHONDROPLASTY;  Surgeon: Mikey Delaney                 MD Prem;  Location: [facility] OR;  Service: Orthopedics;                 Laterality: Left;  Colonoscopy  Knee arthroscopy w/ meniscectomy      Comment:  Procedure: ARTHROSCOPY, KNEE, WITH MENISCECTOMY and a                baker cyst aspiration;  Surgeon: Mikey Amaro MD;  Location: [facility] OR;  Service: Orthopedics;                 Laterality: Left;  medial   Knee surgery     Family History   Problem Relation Age of Onset    Heart disease Father     Skin cancer Father     Aortic aneurysm Father     Dementia Mother     No Known Problems Sister      Social History     Tobacco Use    Smoking status: Never    Smokeless tobacco: Never   Substance Use Topics    Alcohol use: Yes     Comment: once a week    Drug use: No       Current Outpatient Medications   Medication Sig Dispense Refill    aspirin 81 MG Chew Take 1 tablet (81 mg total) by mouth once daily. 90 tablet 3    atorvastatin (LIPITOR) 40 MG tablet Take 1 tablet (40 mg total) by mouth once daily. 90 tablet 3    nitroGLYCERIN (NITROSTAT) 0.4 MG SL tablet Place 1 tablet (0.4 mg total) under the tongue every 5 (five) minutes as needed for Chest pain. 30 tablet 1     No current facility-administered medications for this visit.        Objective:      Physical Exam  BP (!) 147/88 (BP Location: Right arm, Patient Position: Sitting, BP Method: Medium (Automatic))   Pulse 64   Ht 5' (1.524 m)   Wt 54.7 kg (120 lb 9.5 oz)   LMP 02/01/2013   BMI 23.55 kg/m²   Body surface area is 1.52 meters squared.  Body mass index is 23.55 kg/m².    General appearance: alert, appears stated age, cooperative, and no distress  Head: Normocephalic, without obvious abnormality, atraumatic  Neck: no carotid bruit, no JVD, and supple, symmetrical, trachea midline  Lungs: clear to auscultation bilaterally  Heart: regular rate and rhythm; S1, S2 normal, no murmur, click, rub or gallop  Abdomen: soft, non-tender, no distended  Extremities: extremities atraumatic, no  pitting edema  Skin: warm, no cyanosis, no pathologic ecchymosis in exposed portions  Neurologic: Grossly normal. A&O x3      Lab Review   Lab Results   Component Value Date    WBC 4.72 01/12/2023    HGB 14.1 01/12/2023    HCT 43.5 01/12/2023    MCV 95 01/12/2023     01/12/2023         BMP  Lab Results   Component Value Date     01/12/2023    K 4.3 01/12/2023     01/12/2023    CO2 26 01/12/2023    BUN 14 01/12/2023    CREATININE 0.7 01/12/2023    CALCIUM 10.0 01/12/2023    ANIONGAP 7 (L) 01/12/2023    ESTGFRAFRICA >60.0 08/09/2021    EGFRNONAA >60.0 08/09/2021       Lab Results   Component Value Date    ALBUMIN 4.6 01/12/2023       Lab Results   Component Value Date    ALT 22 01/12/2023    AST 24 01/12/2023    ALKPHOS 64 01/12/2023    BILITOT 0.5 01/12/2023       Lab Results   Component Value Date    TSH 1.197 01/12/2023       Lab Results   Component Value Date    CHOL 281 (H) 01/12/2023    CHOL 237 (H) 08/09/2021     Lab Results   Component Value Date    HDL 78 (H) 01/12/2023    HDL 74 08/09/2021     Lab Results   Component Value Date    LDLCALC 188.4 (H) 01/12/2023    LDLCALC 140.6 08/09/2021     Lab Results   Component Value Date    TRIG 73 01/12/2023    TRIG 112 08/09/2021     Lab Results   Component Value Date    CHOLHDL 27.8 01/12/2023    CHOLHDL 31.2 08/09/2021       EKG today:  NSR, WNL   Assessment & Plan:      This is a 62 y.o. pleasant  female who despite being very active and physically fit has severe hypercholesterolemia concerning for familial hypercholesterolemia.  She has aortic atheroma and a dilated aortic root with family history of AAA.  Furthermore she has symptoms consistent with stable angina.  We discussed primary prevention..      1. Stable angina  aspirin 81 MG Chew    nitroGLYCERIN (NITROSTAT) 0.4 MG SL tablet      2. Chest pain, unspecified type  Ambulatory referral/consult to Cardiology    IN OFFICE EKG 12-LEAD (to Muse)      3. Aortic atherosclerosis  CTA  Chest Abdomen Pelvis      4. Family history of aortic aneurysm        5. Acquired dilation of ascending aorta and aortic root  CTA Chest Abdomen Pelvis      6. Familial hypercholesterolemia  atorvastatin (LIPITOR) 40 MG tablet           Agree with exercise stress MPI as ordered by Dr. Benton  CTA of the chest/abdomen/pelvis to evaluate her thoracic aortic ectasia and rule out abdominal aneurysm  Aspirin 81 mg daily.  Discussed risks and benefits  Sublingual nitroglycerin p.r.n.  Increase atorvastatin to 40 mg daily as tolerated  Check blood pressure at home and start ARB if over 135/85    I appreciate the opportunity to participate in Jess Gresham 's care today.  Please follow up with me in 4 weeks.      Garrett Lloyd MD, [facility]  Interventional Cardiology/Structural Heart Disease  [facility]  Office: [phone]     Parts of this note were completed using voice recognition software. Please excuse any misspellings or syntax errors and reach out to me with questions.

## 2023-01-30 ENCOUNTER — TELEPHONE (OUTPATIENT)
Dept: FAMILY MEDICINE | Facility: CLINIC | Age: 63
End: 2023-01-30
Payer: COMMERCIAL

## 2023-01-30 ENCOUNTER — PATIENT MESSAGE (OUTPATIENT)
Dept: CARDIOLOGY | Facility: HOSPITAL | Age: 63
End: 2023-01-30
Payer: COMMERCIAL

## 2023-01-30 NOTE — TELEPHONE ENCOUNTER
----- Message from Samra Benton DO sent at 1/26/2023 11:53 AM CST -----  Please let the patient know that the screen for colon cancer through the stool is negative.  Repeat in one year.    Thanks

## 2023-01-31 ENCOUNTER — HOSPITAL ENCOUNTER (OUTPATIENT)
Dept: RADIOLOGY | Facility: HOSPITAL | Age: 63
Discharge: HOME | End: 2023-01-31
Attending: INTERNAL MEDICINE
Payer: COMMERCIAL

## 2023-01-31 ENCOUNTER — CLINICAL SUPPORT (OUTPATIENT)
Dept: CARDIOLOGY | Facility: HOSPITAL | Age: 63
End: 2023-01-31
Attending: INTERNAL MEDICINE
Payer: COMMERCIAL

## 2023-01-31 VITALS — BODY MASS INDEX: 23.75 KG/M2 | WEIGHT: 121 LBS | HEIGHT: 60 IN

## 2023-01-31 DIAGNOSIS — R07.9 CHEST PAIN, UNSPECIFIED TYPE: ICD-10-CM

## 2023-01-31 LAB
CV STRESS BASE HR: 70 BPM
DIASTOLIC BLOOD PRESSURE: 94 MMHG
NUC STRESS EJECTION FRACTION: 54 %
OHS CV CPX 1 MINUTE RECOVERY HEART RATE: 92 BPM
OHS CV CPX 85 PERCENT MAX PREDICTED HEART RATE MALE: 129
OHS CV CPX ESTIMATED METS: 15
OHS CV CPX MAX PREDICTED HEART RATE: 152
OHS CV CPX PATIENT IS FEMALE: 1
OHS CV CPX PATIENT IS MALE: 0
OHS CV CPX PEAK DIASTOLIC BLOOD PRESSURE: 96 MMHG
OHS CV CPX PEAK HEAR RATE: 144 BPM
OHS CV CPX PEAK RATE PRESSURE PRODUCT: NORMAL
OHS CV CPX PEAK SYSTOLIC BLOOD PRESSURE: 180 MMHG
OHS CV CPX PERCENT MAX PREDICTED HEART RATE ACHIEVED: 95
OHS CV CPX RATE PRESSURE PRODUCT PRESENTING: NORMAL
STRESS ECHO POST EXERCISE DUR MIN: 8 MINUTES
STRESS ECHO POST EXERCISE DUR SEC: 53 SECONDS
SYSTOLIC BLOOD PRESSURE: 143 MMHG

## 2023-01-31 PROCEDURE — 93017 CV STRESS TEST TRACING ONLY: CPT | Mod: PO

## 2023-01-31 PROCEDURE — 78452 HT MUSCLE IMAGE SPECT MULT: CPT | Mod: PO

## 2023-01-31 PROCEDURE — 78452 HT MUSCLE IMAGE SPECT MULT: CPT | Mod: 26,,, | Performed by: INTERNAL MEDICINE

## 2023-01-31 PROCEDURE — A9502 TC99M TETROFOSMIN: HCPCS | Mod: PO

## 2023-01-31 PROCEDURE — 93016 CV STRESS TEST SUPVJ ONLY: CPT | Mod: ,,, | Performed by: INTERNAL MEDICINE

## 2023-01-31 PROCEDURE — 93018 PR CARDIAC STRESS TST,INTERP/REPT ONLY: ICD-10-PCS | Mod: ,,, | Performed by: INTERNAL MEDICINE

## 2023-01-31 PROCEDURE — 93018 CV STRESS TEST I&R ONLY: CPT | Mod: ,,, | Performed by: INTERNAL MEDICINE

## 2023-01-31 PROCEDURE — 78452 NUCLEAR STRESS - CARDIOLOGY INTERPRETED (CUPID ONLY): ICD-10-PCS | Mod: 26,,, | Performed by: INTERNAL MEDICINE

## 2023-01-31 PROCEDURE — 93016 NUCLEAR STRESS - CARDIOLOGY INTERPRETED (CUPID ONLY): ICD-10-PCS | Mod: ,,, | Performed by: INTERNAL MEDICINE

## 2023-02-01 ENCOUNTER — PATIENT MESSAGE (OUTPATIENT)
Dept: FAMILY MEDICINE | Facility: CLINIC | Age: 63
End: 2023-02-01
Payer: COMMERCIAL

## 2023-02-01 NOTE — TELEPHONE ENCOUNTER
Called with the results of stress test. She has CT tomorrow.     She has had 2 further episodes of chest pain up to her neck

## 2023-02-02 ENCOUNTER — HOSPITAL ENCOUNTER (OUTPATIENT)
Dept: RADIOLOGY | Facility: HOSPITAL | Age: 63
Discharge: HOME | End: 2023-02-02
Attending: INTERNAL MEDICINE
Payer: COMMERCIAL

## 2023-02-02 DIAGNOSIS — I77.819 ACQUIRED DILATION OF ASCENDING AORTA AND AORTIC ROOT: ICD-10-CM

## 2023-02-02 DIAGNOSIS — I70.0 AORTIC ATHEROSCLEROSIS: ICD-10-CM

## 2023-02-02 PROCEDURE — 74174 CTA ABD&PLVS W/CONTRAST: CPT | Mod: TC,PO

## 2023-02-02 PROCEDURE — 71275 CT ANGIOGRAPHY CHEST: CPT | Mod: 26,,, | Performed by: RADIOLOGY

## 2023-02-02 PROCEDURE — 71275 CT ANGIOGRAPHY CHEST: CPT | Mod: TC,PO

## 2023-02-02 PROCEDURE — 74174 CTA ABD&PLVS W/CONTRAST: CPT | Mod: 26,,, | Performed by: RADIOLOGY

## 2023-02-02 PROCEDURE — 71275 CTA CHEST ABDOMEN PELVIS: ICD-10-PCS | Mod: 26,,, | Performed by: RADIOLOGY

## 2023-02-02 PROCEDURE — 74174 CTA CHEST ABDOMEN PELVIS: ICD-10-PCS | Mod: 26,,, | Performed by: RADIOLOGY

## 2023-02-02 PROCEDURE — 25500020 PHARM REV CODE 255: Mod: PO | Performed by: INTERNAL MEDICINE

## 2023-02-02 RX ADMIN — IOHEXOL 100 ML: 350 INJECTION, SOLUTION INTRAVENOUS at 04:02

## 2023-02-06 ENCOUNTER — TELEPHONE (OUTPATIENT)
Dept: FAMILY MEDICINE | Facility: CLINIC | Age: 63
End: 2023-02-06
Payer: COMMERCIAL

## 2023-02-06 NOTE — TELEPHONE ENCOUNTER
I reviewed all her data.  The best I can come up with is that she may have mild microvascular angina.  She only had chest pain at peak stress (way above her expected functional capacity) and resolved very quickly with a normal perfusion so my suspicion for obstructive CAD is very low.  I would start her empirically on a low dose of amlodipine and see if that helps.  Her heart rate is already on the low end so beta blocker is probably not going to be well tolerated.  On the other hand it is interesting that her aorta (and her abdominal vessels) are in general big (not quite aneurysmal) compared to her body size so I will scan her annually to ensure no enlargement.  Sending her to a  may not be the worst idea to see if there is a connective tissue disorder given her family history. I hope this helps.     Per cardiology Dr. Lloyd.

## 2023-02-09 ENCOUNTER — PATIENT MESSAGE (OUTPATIENT)
Dept: CARDIOLOGY | Facility: CLINIC | Age: 63
End: 2023-02-09
Payer: COMMERCIAL

## 2023-02-10 NOTE — TELEPHONE ENCOUNTER
These are perfect. I discussed with Dr Benton. We can try a small dose of amlodipine 2.5 mg once daily to prevent angina at high level of exertion or we can wait till f/u appointment.

## 2023-02-15 ENCOUNTER — TELEPHONE (OUTPATIENT)
Dept: FAMILY MEDICINE | Facility: CLINIC | Age: 63
End: 2023-02-15
Payer: COMMERCIAL

## 2023-02-15 RX ORDER — AMLODIPINE BESYLATE 2.5 MG/1
2.5 TABLET ORAL DAILY
Qty: 90 TABLET | Refills: 3 | Status: SHIPPED | OUTPATIENT
Start: 2023-02-15 | End: 2023-02-23

## 2023-02-15 NOTE — TELEPHONE ENCOUNTER
Left message for her to call me about starting low dose amlodipine to help prevent further anginal events.

## 2023-02-17 ENCOUNTER — OFFICE VISIT (OUTPATIENT)
Dept: URGENT CARE | Facility: CLINIC | Age: 63
End: 2023-02-17
Payer: COMMERCIAL

## 2023-02-17 VITALS
SYSTOLIC BLOOD PRESSURE: 129 MMHG | TEMPERATURE: 99 F | OXYGEN SATURATION: 98 % | BODY MASS INDEX: 23.75 KG/M2 | DIASTOLIC BLOOD PRESSURE: 86 MMHG | HEART RATE: 72 BPM | WEIGHT: 121 LBS | HEIGHT: 60 IN

## 2023-02-17 DIAGNOSIS — J06.9 VIRAL URI WITH COUGH: Primary | ICD-10-CM

## 2023-02-17 DIAGNOSIS — J02.9 SORE THROAT: ICD-10-CM

## 2023-02-17 LAB
CTP QC/QA: YES
CTP QC/QA: YES
MOLECULAR STREP A: NEGATIVE
SARS-COV-2 AG RESP QL IA.RAPID: NEGATIVE

## 2023-02-17 PROCEDURE — 3044F PR MOST RECENT HEMOGLOBIN A1C LEVEL <7.0%: ICD-10-PCS | Mod: CPTII,S$GLB,, | Performed by: NURSE PRACTITIONER

## 2023-02-17 PROCEDURE — 3008F PR BODY MASS INDEX (BMI) DOCUMENTED: ICD-10-PCS | Mod: CPTII,S$GLB,, | Performed by: NURSE PRACTITIONER

## 2023-02-17 PROCEDURE — 1159F PR MEDICATION LIST DOCUMENTED IN MEDICAL RECORD: ICD-10-PCS | Mod: CPTII,S$GLB,, | Performed by: NURSE PRACTITIONER

## 2023-02-17 PROCEDURE — 3079F PR MOST RECENT DIASTOLIC BLOOD PRESSURE 80-89 MM HG: ICD-10-PCS | Mod: CPTII,S$GLB,, | Performed by: NURSE PRACTITIONER

## 2023-02-17 PROCEDURE — 1160F PR REVIEW ALL MEDS BY PRESCRIBER/CLIN PHARMACIST DOCUMENTED: ICD-10-PCS | Mod: CPTII,S$GLB,, | Performed by: NURSE PRACTITIONER

## 2023-02-17 PROCEDURE — 87651 POCT STREP A MOLECULAR: ICD-10-PCS | Mod: QW,S$GLB,, | Performed by: NURSE PRACTITIONER

## 2023-02-17 PROCEDURE — 3074F PR MOST RECENT SYSTOLIC BLOOD PRESSURE < 130 MM HG: ICD-10-PCS | Mod: CPTII,S$GLB,, | Performed by: NURSE PRACTITIONER

## 2023-02-17 PROCEDURE — 3044F HG A1C LEVEL LT 7.0%: CPT | Mod: CPTII,S$GLB,, | Performed by: NURSE PRACTITIONER

## 2023-02-17 PROCEDURE — 1160F RVW MEDS BY RX/DR IN RCRD: CPT | Mod: CPTII,S$GLB,, | Performed by: NURSE PRACTITIONER

## 2023-02-17 PROCEDURE — 87811 SARS-COV-2 COVID19 W/OPTIC: CPT | Mod: QW,S$GLB,, | Performed by: NURSE PRACTITIONER

## 2023-02-17 PROCEDURE — 99203 PR OFFICE/OUTPT VISIT, NEW, LEVL III, 30-44 MIN: ICD-10-PCS | Mod: S$GLB,,, | Performed by: NURSE PRACTITIONER

## 2023-02-17 PROCEDURE — 3079F DIAST BP 80-89 MM HG: CPT | Mod: CPTII,S$GLB,, | Performed by: NURSE PRACTITIONER

## 2023-02-17 PROCEDURE — 87651 STREP A DNA AMP PROBE: CPT | Mod: QW,S$GLB,, | Performed by: NURSE PRACTITIONER

## 2023-02-17 PROCEDURE — 3074F SYST BP LT 130 MM HG: CPT | Mod: CPTII,S$GLB,, | Performed by: NURSE PRACTITIONER

## 2023-02-17 PROCEDURE — 1159F MED LIST DOCD IN RCRD: CPT | Mod: CPTII,S$GLB,, | Performed by: NURSE PRACTITIONER

## 2023-02-17 PROCEDURE — 99203 OFFICE O/P NEW LOW 30 MIN: CPT | Mod: S$GLB,,, | Performed by: NURSE PRACTITIONER

## 2023-02-17 PROCEDURE — 3008F BODY MASS INDEX DOCD: CPT | Mod: CPTII,S$GLB,, | Performed by: NURSE PRACTITIONER

## 2023-02-17 PROCEDURE — 87811 SARS CORONAVIRUS 2 ANTIGEN POCT, MANUAL READ: ICD-10-PCS | Mod: QW,S$GLB,, | Performed by: NURSE PRACTITIONER

## 2023-02-17 RX ORDER — BENZONATATE 200 MG/1
200 CAPSULE ORAL 3 TIMES DAILY PRN
Qty: 30 CAPSULE | Refills: 0 | Status: SHIPPED | OUTPATIENT
Start: 2023-02-17 | End: 2024-03-21

## 2023-02-17 NOTE — PROGRESS NOTES
Subjective:       Patient ID: Jess Gresham is a 63 y.o. female.    Vitals:  height is 5' (1.524 m) and weight is 54.9 kg (121 lb). Her temperature is 99.4 °F (37.4 °C). Her blood pressure is 129/86 and her pulse is 72. Her oxygen saturation is 98%.     Chief Complaint: Cough    Cough, congestion, ear pain, sore throat for approximately 2-3 days ago   Patient has note tried anything OTC for relief.   Patient refused COVID testing at time of triage, does not think she has COVID.    Provider note begins below:  Patient reports symptoms for 1 day which includes PND, non-productive cough and sore throat.  Patient denies any chest pain, shortness of breath, fever or chills.  Awake and alert.  Speaking in full sentences.    Other  This is a new problem. The current episode started in the past 7 days. The problem occurs intermittently. The problem has been gradually worsening. Associated symptoms include congestion, coughing and a sore throat. Pertinent negatives include no arthralgias, chest pain, chills, diaphoresis, fatigue, nausea, rash or vomiting. She has tried nothing for the symptoms. The treatment provided no relief.     Constitution: Negative. Negative for chills, sweating and fatigue.   HENT:  Positive for congestion and sore throat. Negative for ear pain and facial swelling.    Neck: Negative for painful lymph nodes.   Cardiovascular: Negative.  Negative for chest trauma, chest pain and sob on exertion.   Eyes: Negative.  Negative for eye itching and eye pain.   Respiratory:  Positive for cough. Negative for chest tightness and asthma.    Gastrointestinal: Negative.  Negative for nausea, vomiting and diarrhea.   Endocrine: negative. cold intolerance and excessive thirst.   Genitourinary: Negative.  Negative for dysuria, frequency, urgency and hematuria.   Musculoskeletal:  Negative for pain, trauma and joint pain.   Skin: Negative.  Negative for rash, wound and hives.   Allergic/Immunologic: Negative.   Negative for eczema, asthma, hives and itching.   Neurological: Negative.  Negative for disorientation and altered mental status.   Hematologic/Lymphatic: Negative.  Negative for swollen lymph nodes.   Psychiatric/Behavioral: Negative.  Negative for altered mental status, disorientation and confusion.      Objective:      Physical Exam   Constitutional: She is oriented to person, place, and time. She appears well-developed. She is cooperative.  Non-toxic appearance. She does not appear ill. No distress.   HENT:   Head: Normocephalic and atraumatic.   Ears:   Right Ear: Hearing, tympanic membrane, external ear and ear canal normal. impacted cerumen  Left Ear: Hearing, tympanic membrane, external ear and ear canal normal. impacted cerumen  Nose: Nose normal. No mucosal edema, rhinorrhea, nasal deformity or congestion. No epistaxis. Right sinus exhibits no maxillary sinus tenderness and no frontal sinus tenderness. Left sinus exhibits no maxillary sinus tenderness and no frontal sinus tenderness.   Mouth/Throat: Uvula is midline, oropharynx is clear and moist and mucous membranes are normal. No trismus in the jaw. Normal dentition. No uvula swelling. No oropharyngeal exudate, posterior oropharyngeal edema or posterior oropharyngeal erythema.   Eyes: Conjunctivae and lids are normal. No scleral icterus.   Neck: Trachea normal and phonation normal. Neck supple. No edema present. No erythema present. No neck rigidity present.   Cardiovascular: Normal rate, regular rhythm, normal heart sounds and normal pulses.   Pulmonary/Chest: Effort normal and breath sounds normal. No stridor. No respiratory distress. She has no decreased breath sounds. She has no wheezes. She has no rhonchi. She has no rales. She exhibits no tenderness.   Abdominal: Normal appearance. She exhibits no distension. Soft. flat abdomen There is no abdominal tenderness. There is no guarding, no left CVA tenderness and no right CVA tenderness.    Musculoskeletal: Normal range of motion.         General: No deformity. Normal range of motion.   Lymphadenopathy:     She has no cervical adenopathy.   Neurological: no focal deficit. She is alert and oriented to person, place, and time. She exhibits normal muscle tone. Coordination normal.   Skin: Skin is warm, dry, intact, not diaphoretic and not pale.   Psychiatric: Her speech is normal and behavior is normal. Mood, judgment and thought content normal.   Nursing note and vitals reviewed.  The following results have been reviewed with the patient:  LABS-  Results for orders placed or performed in visit on 02/17/23   SARS Coronavirus 2 Antigen, POCT Manual Read   Result Value Ref Range    SARS Coronavirus 2 Antigen Negative Negative     Acceptable Yes    POCT Strep A, Molecular   Result Value Ref Range    Molecular Strep A, POC Negative Negative     Acceptable Yes               Assessment:       1. Viral URI with cough    2. Sore throat          Plan:       FOLLOWUP  Follow up if symptoms worsen or fail to improve, for PLEASE CONTACT PCP OR CONTACT THE EMERGENCY ROOM..     PATIENT INSTRUCTIONS  Patient Instructions   INSTRUCTIONS:  - Rest.  - Drink plenty of fluids.  - Take Tylenol and/or Ibuprofen as directed as needed for fever/pain.  Do not take more than the recommended dose.  - follow up with your PCP within the next 1-2 weeks as needed.  - You must understand that you have received an Urgent Care treatment only and that you may be released before all of your medical problems are known or treated.   - You, the patient, will arrange for follow up care as instructed.   - If your condition worsens or fails to improve we recommend that you receive another evaluation at the ER immediately or contact your PCP to discuss your concerns.   - You can call [phone] or [phone] to help schedule an appointment with the appropriate provider.     -If you smoke cigarettes, it would  be beneficial for you to stop.        THANK YOU FOR ALLOWING ME TO PARTICIPATE IN YOUR HEALTHCARE,     Raymundo Jorge, NP   Viral URI with cough  -     benzonatate (TESSALON) 200 MG capsule; Take 1 capsule (200 mg total) by mouth 3 (three) times daily as needed for Cough.  Dispense: 30 capsule; Refill: 0    Sore throat  -     SARS Coronavirus 2 Antigen, POCT Manual Read  -     POCT Strep A, Molecular

## 2023-02-17 NOTE — PATIENT INSTRUCTIONS

## 2023-02-23 ENCOUNTER — OFFICE VISIT (OUTPATIENT)
Dept: CARDIOLOGY | Facility: CLINIC | Age: 63
End: 2023-02-23
Payer: COMMERCIAL

## 2023-02-23 VITALS
HEART RATE: 78 BPM | HEIGHT: 60 IN | WEIGHT: 120.56 LBS | SYSTOLIC BLOOD PRESSURE: 123 MMHG | BODY MASS INDEX: 23.67 KG/M2 | DIASTOLIC BLOOD PRESSURE: 88 MMHG

## 2023-02-23 DIAGNOSIS — Z82.49 FAMILY HISTORY OF AORTIC ANEURYSM: ICD-10-CM

## 2023-02-23 DIAGNOSIS — I70.0 AORTIC ATHEROSCLEROSIS: ICD-10-CM

## 2023-02-23 DIAGNOSIS — I77.819 ACQUIRED DILATION OF ASCENDING AORTA AND AORTIC ROOT: ICD-10-CM

## 2023-02-23 DIAGNOSIS — E78.01 FAMILIAL HYPERCHOLESTEROLEMIA: Primary | ICD-10-CM

## 2023-02-23 PROCEDURE — 99999 PR PBB SHADOW E&M-EST. PATIENT-LVL III: CPT | Mod: PBBFAC,,, | Performed by: INTERNAL MEDICINE

## 2023-02-23 PROCEDURE — 3044F PR MOST RECENT HEMOGLOBIN A1C LEVEL <7.0%: ICD-10-PCS | Mod: CPTII,S$GLB,, | Performed by: INTERNAL MEDICINE

## 2023-02-23 PROCEDURE — 3074F PR MOST RECENT SYSTOLIC BLOOD PRESSURE < 130 MM HG: ICD-10-PCS | Mod: CPTII,S$GLB,, | Performed by: INTERNAL MEDICINE

## 2023-02-23 PROCEDURE — 99999 PR PBB SHADOW E&M-EST. PATIENT-LVL III: ICD-10-PCS | Mod: PBBFAC,,, | Performed by: INTERNAL MEDICINE

## 2023-02-23 PROCEDURE — 99214 OFFICE O/P EST MOD 30 MIN: CPT | Mod: S$GLB,,, | Performed by: INTERNAL MEDICINE

## 2023-02-23 PROCEDURE — 3008F PR BODY MASS INDEX (BMI) DOCUMENTED: ICD-10-PCS | Mod: CPTII,S$GLB,, | Performed by: INTERNAL MEDICINE

## 2023-02-23 PROCEDURE — 3008F BODY MASS INDEX DOCD: CPT | Mod: CPTII,S$GLB,, | Performed by: INTERNAL MEDICINE

## 2023-02-23 PROCEDURE — 3074F SYST BP LT 130 MM HG: CPT | Mod: CPTII,S$GLB,, | Performed by: INTERNAL MEDICINE

## 2023-02-23 PROCEDURE — 1159F PR MEDICATION LIST DOCUMENTED IN MEDICAL RECORD: ICD-10-PCS | Mod: CPTII,S$GLB,, | Performed by: INTERNAL MEDICINE

## 2023-02-23 PROCEDURE — 1159F MED LIST DOCD IN RCRD: CPT | Mod: CPTII,S$GLB,, | Performed by: INTERNAL MEDICINE

## 2023-02-23 PROCEDURE — 3079F DIAST BP 80-89 MM HG: CPT | Mod: CPTII,S$GLB,, | Performed by: INTERNAL MEDICINE

## 2023-02-23 PROCEDURE — 99214 PR OFFICE/OUTPT VISIT, EST, LEVL IV, 30-39 MIN: ICD-10-PCS | Mod: S$GLB,,, | Performed by: INTERNAL MEDICINE

## 2023-02-23 PROCEDURE — 3079F PR MOST RECENT DIASTOLIC BLOOD PRESSURE 80-89 MM HG: ICD-10-PCS | Mod: CPTII,S$GLB,, | Performed by: INTERNAL MEDICINE

## 2023-02-23 PROCEDURE — 3044F HG A1C LEVEL LT 7.0%: CPT | Mod: CPTII,S$GLB,, | Performed by: INTERNAL MEDICINE

## 2023-02-23 NOTE — PROGRESS NOTES
Cardiology Clinic Note      Patient: Jess Gresham, 1960, [id number]  Primary Care Provider: Samra Benton DO     Chief Complaint/Reason for Referral: angina     Subjective:       Jess Gresham is a 63 y.o. female who presents for establishing care. unaccompaneid    . Still with chest pressure after heavy exercise that goes up the right side of her neck; not as bad. No syncope. No edema. No orthopnea. Started amlodipine, cant tell any difference. No issues with atorvastatin.     Focused Past History includes:  Exercise stress SPECT MPI 1/2023:  Normal perfusion, EF 54%. 15 METs, normal BP response. Negative EKG response. She had symptoms at peak stress.   Severe hypercholesterolemia.    Now on atorva 40  TTE January 2023:  Normal LV size and EF 50-60%.  Mildly dilated ascending aorta at 3.8 (trileaflet aortic valve) with plaque in the descending aorta  CTA C/A/AP 2/2023: no aneurysm in the entire aorta. Mild atheroma. No KYLIE  Father with CAD age in his 50s. Also with premature AAA  Never smoker; social EtOH; no recreational drugs  No history of cancer, DM, stroke    Review of Systems  Constitutional: negative for fevers, night sweats, and weight loss  Eyes: negative for visual disturbance, diplopia  Respiratory: negative for cough, hemoptysis, sputum, and wheezing  Cardiovascular: see HPI  Gastrointestinal: negative for abdominal pain, bright red blood per rectum, change in bowel habits, dysphagia, melena, and reflux symptoms  Genitourinary:negative for dysuria, frequency, and hematuria  Hematologic/lymphatic: negative for bleeding, easy bruising, and lymphadenopathy  Musculoskeletal:negative for arthralgias, back pain, and myalgias  Neurological: negative for gait problems, paresthesia, speech problems, vertigo, and weakness  Behavioral/Psych: negative for excessive alcohol consumption, illegal drug usage, and sleep disturbance    ----------------------------  Abnormal Pap smear of  cervix      Comment:  in early 1990s---cone biopsy which was negative---all                pap normal since   ----------------------------  Chondroplasty      Comment:  Procedure: CHONDROPLASTY;  Surgeon: Mikey Amaro MD;  Location: [facility] OR;  Service: Orthopedics;                 Laterality: Left;  Colonoscopy  Knee arthroscopy w/ meniscectomy      Comment:  Procedure: ARTHROSCOPY, KNEE, WITH MENISCECTOMY and a                baker cyst aspiration;  Surgeon: Mikey Amaro MD;  Location: [facility] OR;  Service: Orthopedics;                 Laterality: Left;  medial   Knee surgery     Family History   Problem Relation Age of Onset    Heart disease Father     Skin cancer Father     Aortic aneurysm Father     Dementia Mother     No Known Problems Sister      Social History     Tobacco Use    Smoking status: Never    Smokeless tobacco: Never   Substance Use Topics    Alcohol use: Yes     Comment: once a week    Drug use: No       Current Outpatient Medications   Medication Sig Dispense Refill    atorvastatin (LIPITOR) 40 MG tablet Take 1 tablet (40 mg total) by mouth once daily. 90 tablet 3    benzonatate (TESSALON) 200 MG capsule Take 1 capsule (200 mg total) by mouth 3 (three) times daily as needed for Cough. 30 capsule 0    nitroGLYCERIN (NITROSTAT) 0.4 MG SL tablet Place 1 tablet (0.4 mg total) under the tongue every 5 (five) minutes as needed for Chest pain. 30 tablet 1     No current facility-administered medications for this visit.        Objective:      Physical Exam  /88 (BP Location: Right arm, Patient Position: Sitting, BP Method: Medium (Automatic))   Pulse 78   Ht 5' (1.524 m)   Wt 54.7 kg (120 lb 9.5 oz)   LMP 02/01/2013   BMI 23.55 kg/m²   Body surface area is 1.52 meters squared.  Body mass index is 23.55 kg/m².    General appearance: alert, appears stated age, cooperative, and no distress  Head: Normocephalic, without obvious abnormality,  atraumatic  Neck: no carotid bruit, no JVD, and supple, symmetrical, trachea midline  Lungs: clear to auscultation bilaterally  Heart: regular rate and rhythm; S1, S2 normal, no murmur, click, rub or gallop  Abdomen: soft, non-tender, no distended  Extremities: extremities atraumatic, no pitting edema  Skin: warm, no cyanosis, no pathologic ecchymosis in exposed portions  Neurologic: Grossly normal. A&O x3      Lab Review   Lab Results   Component Value Date    WBC 4.72 01/12/2023    HGB 14.1 01/12/2023    HCT 43.5 01/12/2023    MCV 95 01/12/2023     01/12/2023         BMP  Lab Results   Component Value Date     01/12/2023    K 4.3 01/12/2023     01/12/2023    CO2 26 01/12/2023    BUN 14 01/12/2023    CREATININE 0.7 01/12/2023    CALCIUM 10.0 01/12/2023    ANIONGAP 7 (L) 01/12/2023    ESTGFRAFRICA >60.0 08/09/2021    EGFRNONAA >60.0 08/09/2021       Lab Results   Component Value Date    ALBUMIN 4.6 01/12/2023       Lab Results   Component Value Date    ALT 22 01/12/2023    AST 24 01/12/2023    ALKPHOS 64 01/12/2023    BILITOT 0.5 01/12/2023       Lab Results   Component Value Date    TSH 1.197 01/12/2023       Lab Results   Component Value Date    CHOL 281 (H) 01/12/2023    CHOL 237 (H) 08/09/2021     Lab Results   Component Value Date    HDL 78 (H) 01/12/2023    HDL 74 08/09/2021     Lab Results   Component Value Date    LDLCALC 188.4 (H) 01/12/2023    LDLCALC 140.6 08/09/2021     Lab Results   Component Value Date    TRIG 73 01/12/2023    TRIG 112 08/09/2021     Lab Results   Component Value Date    CHOLHDL 27.8 01/12/2023    CHOLHDL 31.2 08/09/2021       EKG 1/26/23:  NSR, WNL   Assessment & Plan:      This is a 63 y.o. pleasant  female who despite being very active and physically fit has severe hypercholesterolemia concerning for familial hypercholesterolemia.  She has aortic atheroma and a dilated aortic root with family history of AAA.  Her symptoms are not as frequent thankfully  but not due to amlodipine.      1. Familial hypercholesterolemia  CV Ultrasound Bilateral Doppler Carotid      2. Aortic atherosclerosis        3. Acquired dilation of ascending aorta and aortic root        4. Family history of aortic aneurysm               D/c aspirin and amlodipine (no benefit)  Continue atorva 40. Recheck lipid panel in April. May increase to 80 mg daily if LDL-C>100  TTE in 12-24 months for aortic root ectasia  ARB for HTN if she were to develop that in future (given her aortic ectasia)  If she has more episodes of severe discomfort then will refer to Rb-cPET for myocardial flow reserve  Carotid DUS   I provided reassurance about her symptoms and advised continued physical activity.       I appreciate the opportunity to participate in Jess Gresham 's care today.  Please follow up with me in 12 months.      Garrett Lloyd MD, [facility]  Interventional Cardiology/Structural Heart Disease  [facility]  Office: [phone]     Parts of this note were completed using voice recognition software. Please excuse any misspellings or syntax errors and reach out to me with questions.

## 2023-02-23 NOTE — PATIENT INSTRUCTIONS
You may discontinue aspirin and amlodipine.  Continue the atorvastatin 40 mg daily. Check lipid panel in April  Goal blood pressure is consistently <140/90  Carotid ultrasound   Return in 12 months

## 2023-03-01 ENCOUNTER — CLINICAL SUPPORT (OUTPATIENT)
Dept: CARDIOLOGY | Facility: HOSPITAL | Age: 63
End: 2023-03-01
Attending: INTERNAL MEDICINE
Payer: COMMERCIAL

## 2023-03-01 DIAGNOSIS — E78.01 FAMILIAL HYPERCHOLESTEROLEMIA: ICD-10-CM

## 2023-03-01 PROCEDURE — 93880 CV US DOPPLER CAROTID (CUPID ONLY): ICD-10-PCS | Mod: 26,,, | Performed by: INTERNAL MEDICINE

## 2023-03-01 PROCEDURE — 93880 EXTRACRANIAL BILAT STUDY: CPT | Mod: PO

## 2023-03-01 PROCEDURE — 93880 EXTRACRANIAL BILAT STUDY: CPT | Mod: 26,,, | Performed by: INTERNAL MEDICINE

## 2023-03-02 LAB
LEFT ARM DIASTOLIC BLOOD PRESSURE: 70 MMHG
LEFT ARM SYSTOLIC BLOOD PRESSURE: 116 MMHG
LEFT CBA DIAS: 24 CM/S
LEFT CBA SYS: 58 CM/S
LEFT CCA DIST DIAS: 27 CM/S
LEFT CCA DIST SYS: 70 CM/S
LEFT CCA MID DIAS: 28 CM/S
LEFT CCA MID SYS: 74 CM/S
LEFT CCA PROX DIAS: 30 CM/S
LEFT CCA PROX SYS: 100 CM/S
LEFT ECA DIAS: 11 CM/S
LEFT ECA SYS: 45 CM/S
LEFT ICA DIST DIAS: 52 CM/S
LEFT ICA DIST SYS: 110 CM/S
LEFT ICA MID DIAS: 26 CM/S
LEFT ICA MID SYS: 56 CM/S
LEFT ICA PROX DIAS: 30 CM/S
LEFT ICA PROX SYS: 58 CM/S
LEFT VERTEBRAL DIAS: 20 CM/S
LEFT VERTEBRAL SYS: 49 CM/S
OHS CV CAROTID RIGHT ICA EDV HIGHEST: 45
OHS CV CAROTID ULTRASOUND LEFT ICA/CCA RATIO: 1.57
OHS CV CAROTID ULTRASOUND RIGHT ICA/CCA RATIO: 1.38
OHS CV PV CAROTID LEFT HIGHEST CCA: 100
OHS CV PV CAROTID LEFT HIGHEST ICA: 110
OHS CV PV CAROTID RIGHT HIGHEST CCA: 69
OHS CV PV CAROTID RIGHT HIGHEST ICA: 88
OHS CV US CAROTID LEFT HIGHEST EDV: 52
RIGHT ARM DIASTOLIC BLOOD PRESSURE: 60 MMHG
RIGHT ARM SYSTOLIC BLOOD PRESSURE: 110 MMHG
RIGHT CBA DIAS: 26 CM/S
RIGHT CBA SYS: 64 CM/S
RIGHT CCA DIST DIAS: 26 CM/S
RIGHT CCA DIST SYS: 64 CM/S
RIGHT CCA MID DIAS: 23 CM/S
RIGHT CCA MID SYS: 67 CM/S
RIGHT CCA PROX DIAS: 22 CM/S
RIGHT CCA PROX SYS: 69 CM/S
RIGHT ECA DIAS: 8 CM/S
RIGHT ECA SYS: 55 CM/S
RIGHT ICA DIST DIAS: 26 CM/S
RIGHT ICA DIST SYS: 63 CM/S
RIGHT ICA MID DIAS: 45 CM/S
RIGHT ICA MID SYS: 88 CM/S
RIGHT ICA PROX DIAS: 27 CM/S
RIGHT ICA PROX SYS: 63 CM/S
RIGHT VERTEBRAL DIAS: 10 CM/S
RIGHT VERTEBRAL SYS: 29 CM/S

## 2023-03-28 ENCOUNTER — PATIENT MESSAGE (OUTPATIENT)
Dept: ORTHOPEDICS | Facility: CLINIC | Age: 63
End: 2023-03-28
Payer: COMMERCIAL

## 2023-04-19 ENCOUNTER — PATIENT MESSAGE (OUTPATIENT)
Dept: FAMILY MEDICINE | Facility: CLINIC | Age: 63
End: 2023-04-19
Payer: COMMERCIAL

## 2023-05-24 ENCOUNTER — OFFICE VISIT (OUTPATIENT)
Dept: PODIATRY | Facility: CLINIC | Age: 63
End: 2023-05-24
Payer: COMMERCIAL

## 2023-05-24 DIAGNOSIS — B07.0 PLANTAR WART OF LEFT FOOT: Primary | ICD-10-CM

## 2023-05-24 PROCEDURE — 1160F RVW MEDS BY RX/DR IN RCRD: CPT | Mod: CPTII,S$GLB,, | Performed by: STUDENT IN AN ORGANIZED HEALTH CARE EDUCATION/TRAINING PROGRAM

## 2023-05-24 PROCEDURE — 3044F HG A1C LEVEL LT 7.0%: CPT | Mod: CPTII,S$GLB,, | Performed by: STUDENT IN AN ORGANIZED HEALTH CARE EDUCATION/TRAINING PROGRAM

## 2023-05-24 PROCEDURE — 1159F PR MEDICATION LIST DOCUMENTED IN MEDICAL RECORD: ICD-10-PCS | Mod: CPTII,S$GLB,, | Performed by: STUDENT IN AN ORGANIZED HEALTH CARE EDUCATION/TRAINING PROGRAM

## 2023-05-24 PROCEDURE — 99999 PR PBB SHADOW E&M-EST. PATIENT-LVL II: CPT | Mod: PBBFAC,,, | Performed by: STUDENT IN AN ORGANIZED HEALTH CARE EDUCATION/TRAINING PROGRAM

## 2023-05-24 PROCEDURE — 17110 DESTRUCTION B9 LES UP TO 14: CPT | Mod: S$GLB,,, | Performed by: STUDENT IN AN ORGANIZED HEALTH CARE EDUCATION/TRAINING PROGRAM

## 2023-05-24 PROCEDURE — 99203 PR OFFICE/OUTPT VISIT, NEW, LEVL III, 30-44 MIN: ICD-10-PCS | Mod: 25,S$GLB,, | Performed by: STUDENT IN AN ORGANIZED HEALTH CARE EDUCATION/TRAINING PROGRAM

## 2023-05-24 PROCEDURE — 3044F PR MOST RECENT HEMOGLOBIN A1C LEVEL <7.0%: ICD-10-PCS | Mod: CPTII,S$GLB,, | Performed by: STUDENT IN AN ORGANIZED HEALTH CARE EDUCATION/TRAINING PROGRAM

## 2023-05-24 PROCEDURE — 1160F PR REVIEW ALL MEDS BY PRESCRIBER/CLIN PHARMACIST DOCUMENTED: ICD-10-PCS | Mod: CPTII,S$GLB,, | Performed by: STUDENT IN AN ORGANIZED HEALTH CARE EDUCATION/TRAINING PROGRAM

## 2023-05-24 PROCEDURE — 99999 PR PBB SHADOW E&M-EST. PATIENT-LVL II: ICD-10-PCS | Mod: PBBFAC,,, | Performed by: STUDENT IN AN ORGANIZED HEALTH CARE EDUCATION/TRAINING PROGRAM

## 2023-05-24 PROCEDURE — 1159F MED LIST DOCD IN RCRD: CPT | Mod: CPTII,S$GLB,, | Performed by: STUDENT IN AN ORGANIZED HEALTH CARE EDUCATION/TRAINING PROGRAM

## 2023-05-24 PROCEDURE — 99203 OFFICE O/P NEW LOW 30 MIN: CPT | Mod: 25,S$GLB,, | Performed by: STUDENT IN AN ORGANIZED HEALTH CARE EDUCATION/TRAINING PROGRAM

## 2023-05-24 PROCEDURE — 17110 PR DESTRUCTION BENIGN LESIONS UP TO 14: ICD-10-PCS | Mod: S$GLB,,, | Performed by: STUDENT IN AN ORGANIZED HEALTH CARE EDUCATION/TRAINING PROGRAM

## 2023-05-24 NOTE — PROGRESS NOTES
Chief Complaint   Patient presents with    Plantar Warts       HPI:   Jess Gresham is a 63 y.o. female with concerns of  wart left toe, which appears to be present for weeks  Patient reports no acute injury to the area.    Patient states the pain occurs with direct pressure and weight bearing.      Treatment tried at home: salicylic acid, pumice stone      Patient Active Problem List   Diagnosis    Trigger finger, right middle finger    Acute medial meniscal tear, left, initial encounter    Familial hypercholesterolemia    Acquired dilation of ascending aorta and aortic root    Family history of aortic aneurysm    Stable angina    Aortic atherosclerosis    Chest pain         Current Outpatient Medications on File Prior to Visit   Medication Sig Dispense Refill    atorvastatin (LIPITOR) 40 MG tablet Take 1 tablet (40 mg total) by mouth once daily. 90 tablet 3    nitroGLYCERIN (NITROSTAT) 0.4 MG SL tablet Place 1 tablet (0.4 mg total) under the tongue every 5 (five) minutes as needed for Chest pain. 30 tablet 1    benzonatate (TESSALON) 200 MG capsule Take 1 capsule (200 mg total) by mouth 3 (three) times daily as needed for Cough. (Patient not taking: Reported on 5/24/2023) 30 capsule 0     No current facility-administered medications on file prior to visit.         Review of patient's allergies indicates:   Allergen Reactions    Ciprofloxacin Anaphylaxis    Darvocet a500 [propoxyphene n-acetaminophen] Hives    Oxycodone Hives                 ROS:   General ROS: negative  Respiratory ROS: no cough, shortness of breath, or wheezing  Cardiovascular ROS: no chest pain or dyspnea on exertion  Musculoskeletal ROS: negative  Neurological ROS: no TIA or stroke symptoms  Dermatological ROS: negative        EXAM:     There were no vitals filed for this visit.     General: Patient is WD/WN, alert and oriented x 3 and in no apparent distress.     Left  Lower extremity exam:      Vascular:   Dorsalis pedis and posterior tibial  pulses are 2/4 .    3 seconds capillary refill time   Toes are warm to touch.     There is no edema.       Neurological:    Light touch, proprioception, and sharp/dull sensation are all intact.     No focal deficits.  Negative Mulders.   Negative Tinels.         Dermatological:    Location: medial hallux  flat round -appearing lesion  Petechiae is present.   No open wounds.  No bruising.  No redness.       Musculoskeletal:                  ASSESSMENT/PLAN:    Problem List Items Addressed This Visit    None  Visit Diagnoses       Plantar wart of left foot    -  Primary              I counseled the patient on the patient's conditions, their implications and medical management.     With patient's permission, TCA and salinocaine was applied to the single wart lesion on the plantar surface of the left toe using a sterile applicator, including a 1-mm rim around the lesion.  A non-porous tape was applied to the area.   Patient tolerated the procedure well without immediate complications.      Patient will leave the tape on for at least twenty four hours and remove the tape to gently wash the area with soap and water.  Patient is advised of blistering and pain and will modify shoes and activities as needed.  OTC NSAIDs are okay to take for pain.     Isra Licona DPM

## 2023-06-26 ENCOUNTER — OFFICE VISIT (OUTPATIENT)
Dept: PODIATRY | Facility: CLINIC | Age: 63
End: 2023-06-26
Payer: COMMERCIAL

## 2023-06-26 DIAGNOSIS — B07.0 PLANTAR WART OF LEFT FOOT: Primary | ICD-10-CM

## 2023-06-26 PROCEDURE — 99999 PR PBB SHADOW E&M-EST. PATIENT-LVL II: ICD-10-PCS | Mod: PBBFAC,,, | Performed by: STUDENT IN AN ORGANIZED HEALTH CARE EDUCATION/TRAINING PROGRAM

## 2023-06-26 PROCEDURE — 99999 PR PBB SHADOW E&M-EST. PATIENT-LVL II: CPT | Mod: PBBFAC,,, | Performed by: STUDENT IN AN ORGANIZED HEALTH CARE EDUCATION/TRAINING PROGRAM

## 2023-06-26 PROCEDURE — 17000 PR DESTRUCTION(LASER SURGERY,CRYOSURGERY,CHEMOSURGERY),PREMALIGNANT LESIONS,FIRST LESION: ICD-10-PCS | Mod: S$GLB,,, | Performed by: STUDENT IN AN ORGANIZED HEALTH CARE EDUCATION/TRAINING PROGRAM

## 2023-06-26 PROCEDURE — 99499 UNLISTED E&M SERVICE: CPT | Mod: S$GLB,,, | Performed by: STUDENT IN AN ORGANIZED HEALTH CARE EDUCATION/TRAINING PROGRAM

## 2023-06-26 PROCEDURE — 17000 DESTRUCT PREMALG LESION: CPT | Mod: S$GLB,,, | Performed by: STUDENT IN AN ORGANIZED HEALTH CARE EDUCATION/TRAINING PROGRAM

## 2023-06-26 PROCEDURE — 99499 NO LOS: ICD-10-PCS | Mod: S$GLB,,, | Performed by: STUDENT IN AN ORGANIZED HEALTH CARE EDUCATION/TRAINING PROGRAM

## 2023-06-26 NOTE — PROGRESS NOTES
Chief Complaint   Patient presents with    Follow-up    Plantar Warts     Lt  foot       HPI:   Jess Gresham is a 63 y.o. female with concerns of  wart left toe, which appears to be present for weeks  Patient reports no acute injury to the area.    Patient states the pain occurs with direct pressure and weight bearing.      Treatment tried at home: salicylic acid, pumice stone    06/26/2023  Return for plantar fasciitis    Patient Active Problem List   Diagnosis    Trigger finger, right middle finger    Acute medial meniscal tear, left, initial encounter    Familial hypercholesterolemia    Acquired dilation of ascending aorta and aortic root    Family history of aortic aneurysm    Stable angina    Aortic atherosclerosis    Chest pain         Current Outpatient Medications on File Prior to Visit   Medication Sig Dispense Refill    atorvastatin (LIPITOR) 40 MG tablet Take 1 tablet (40 mg total) by mouth once daily. 90 tablet 3    benzonatate (TESSALON) 200 MG capsule Take 1 capsule (200 mg total) by mouth 3 (three) times daily as needed for Cough. 30 capsule 0    nitroGLYCERIN (NITROSTAT) 0.4 MG SL tablet Place 1 tablet (0.4 mg total) under the tongue every 5 (five) minutes as needed for Chest pain. 30 tablet 1     No current facility-administered medications on file prior to visit.         Review of patient's allergies indicates:   Allergen Reactions    Ciprofloxacin Anaphylaxis    Darvocet a500 [propoxyphene n-acetaminophen] Hives    Oxycodone Hives                 ROS:   General ROS: negative  Respiratory ROS: no cough, shortness of breath, or wheezing  Cardiovascular ROS: no chest pain or dyspnea on exertion  Musculoskeletal ROS: negative  Neurological ROS: no TIA or stroke symptoms  Dermatological ROS: negative        EXAM:     There were no vitals filed for this visit.     General: Patient is WD/WN, alert and oriented x 3 and in no apparent distress.     Left  Lower extremity exam:      Vascular:   Dorsalis  pedis and posterior tibial pulses are 2/4.    3 seconds capillary refill time   Toes are warm to touch.     There is no edema.       Neurological:    Light touch, proprioception, and sharp/dull sensation are all intact.     No focal deficits.  Negative Mulders.   Negative Tinels.         Dermatological:    Location: medial hallux  flat round -appearing lesion  Petechiae is present.   No open wounds.  No bruising.  No redness.       Musculoskeletal:        ASSESSMENT/PLAN:    Problem List Items Addressed This Visit    None  Visit Diagnoses       Plantar wart of left foot    -  Primary            I counseled the patient on the patient's conditions, their implications and medical management.     With patient's permission, TCA and salinocaine was applied to the single wart lesion on the plantar surface of the left toe using a sterile applicator, including a 1-mm rim around the lesion.  A non-porous tape was applied to the area.   Patient tolerated the procedure well without immediate complications.      Patient will leave the tape on for at least twenty four hours and remove the tape to gently wash the area with soap and water.  Patient is advised of blistering and pain and will modify shoes and activities as needed.  OTC NSAIDs are okay to take for pain.     Isra Licona DPM

## 2023-07-30 NOTE — PROGRESS NOTES
[employer] was given to patient on 1/12/2023 9:06 AM --lp        Principal Discharge DX:	Abdominal pain   1

## 2024-03-21 ENCOUNTER — OFFICE VISIT (OUTPATIENT)
Dept: OTOLARYNGOLOGY | Facility: CLINIC | Age: 64
End: 2024-03-21
Payer: COMMERCIAL

## 2024-03-21 VITALS — BODY MASS INDEX: 24.37 KG/M2 | WEIGHT: 124.13 LBS | HEIGHT: 60 IN

## 2024-03-21 DIAGNOSIS — J34.89 SORE IN NOSE: Primary | ICD-10-CM

## 2024-03-21 PROCEDURE — 1159F MED LIST DOCD IN RCRD: CPT | Mod: CPTII,S$GLB,, | Performed by: STUDENT IN AN ORGANIZED HEALTH CARE EDUCATION/TRAINING PROGRAM

## 2024-03-21 PROCEDURE — 3008F BODY MASS INDEX DOCD: CPT | Mod: CPTII,S$GLB,, | Performed by: STUDENT IN AN ORGANIZED HEALTH CARE EDUCATION/TRAINING PROGRAM

## 2024-03-21 PROCEDURE — 99999 PR PBB SHADOW E&M-EST. PATIENT-LVL III: CPT | Mod: PBBFAC,,, | Performed by: STUDENT IN AN ORGANIZED HEALTH CARE EDUCATION/TRAINING PROGRAM

## 2024-03-21 PROCEDURE — 99203 OFFICE O/P NEW LOW 30 MIN: CPT | Mod: S$GLB,,, | Performed by: STUDENT IN AN ORGANIZED HEALTH CARE EDUCATION/TRAINING PROGRAM

## 2024-03-21 RX ORDER — MUPIROCIN 20 MG/G
OINTMENT TOPICAL
Qty: 30 G | Refills: 1 | Status: SHIPPED | OUTPATIENT
Start: 2024-03-21

## 2024-03-21 NOTE — PROGRESS NOTES
Otolaryngology Clinic Note    Subjective:       Patient ID: Jess Gresham is a 64 y.o. female.    Chief Complaint: sore in nostril      History of Present Illness: Jess Gresham is a 64 y.o. female presenting with sore in left nostril. Noticed it 3-4 weeks ago. Has not changed, is not healing. Was runny at first. Gets fever blisters but not healing. It hurts when she pushes, sharp pain. Has not spread. Bleeds with qtip. Never happened before. No MRSA history. Did not use neosporin or ointment.       Past Surgical History:   Procedure Laterality Date    CHONDROPLASTY Left 12/11/2020    Procedure: CHONDROPLASTY;  Surgeon: Mikey Amaro MD;  Location: [facility] OR;  Service: Orthopedics;  Laterality: Left;    COLONOSCOPY  2014    KNEE ARTHROSCOPY W/ MENISCECTOMY Left 12/11/2020    Procedure: ARTHROSCOPY, KNEE, WITH MENISCECTOMY and a baker cyst aspiration;  Surgeon: Mikey Amaro MD;  Location: [facility] OR;  Service: Orthopedics;  Laterality: Left;  medial     KNEE SURGERY Bilateral      Past Medical History:   Diagnosis Date    Abnormal Pap smear of cervix     in early 1990s---cone biopsy which was negative---all pap normal since      Social Determinants of Health     Tobacco Use: Low Risk  (3/21/2024)    Patient History     Smoking Tobacco Use: Never     Smokeless Tobacco Use: Never     Passive Exposure: Not on file   Alcohol Use: Patient Declined (3/14/2024)    AUDIT-C     Frequency of Alcohol Consumption: Patient declined     Average Number of Drinks: Patient declined     Frequency of Binge Drinking: Patient declined   Financial Resource Strain: Low Risk  (3/14/2024)    Overall Financial Resource Strain (CARDIA)     Difficulty of Paying Living Expenses: Not hard at all   Food Insecurity: Unknown (3/14/2024)    Hunger Vital Sign     Worried About Running Out of Food in the Last Year: Never true     Ran Out of Food in the Last Year: Patient declined   Transportation Needs: No Transportation Needs  (3/14/2024)    PRAPARE - Transportation     Lack of Transportation (Medical): No     Lack of Transportation (Non-Medical): No   Physical Activity: Sufficiently Active (3/14/2024)    Exercise Vital Sign     Days of Exercise per Week: 5 days     Minutes of Exercise per Session: 40 min   Stress: Patient Declined (3/14/2024)    [employer] - Occupational Stress Questionnaire     Feeling of Stress : Patient declined   Social Connections: Unknown (3/14/2024)    Social Connection and Isolation Panel [NHANES]     Frequency of Communication with Friends and Family: Patient declined     Frequency of Social Gatherings with Friends and Family: Not on file     Attends Congregational Services: Not on file     Active Member of Clubs or Organizations: No     Attends Club or Organization Meetings: Patient declined     Marital Status:    Housing Stability: Patient Declined (3/14/2024)    Housing Stability Vital Sign     Unable to Pay for Housing in the Last Year: Patient declined     Number of Places Lived in the Last Year: Not on file     Unstable Housing in the Last Year: Patient declined   Depression: Low Risk  (1/12/2023)    Depression     Last PHQ-4: Flowsheet Data: 0     Review of patient's allergies indicates:   Allergen Reactions    Ciprofloxacin Anaphylaxis    Darvocet a500 [propoxyphene n-acetaminophen] Hives    Oxycodone Hives     Current Outpatient Medications   Medication Instructions    atorvastatin (LIPITOR) 40 mg, Oral, Daily    nitroGLYCERIN (NITROSTAT) 0.4 mg, Sublingual, Every 5 min PRN         ENT ROS negative except as stated above.     Patient answers are not available for this visit.            Objective:      There were no vitals filed for this visit.    General: NAD, well appearing  Eyes: Normal conjunctiva and lids  Face: symmetric, nerve intact  Nose: The nose is without any evidence of any deformity. Has scabbing and fissure at left alar attachment. The nasal mucosa is moist.  The septum is midline. There is no evidence of septal hematoma. The turbinates are without abnormality.   Ears: The ears are with normal-appearing pinna. Examination of the canals is normal appearing bilaterally. There is no drainage or erythema noted. The tympanic membranes are normal appearing with pearly color, normal-appearing landmarks and normal light reflex. Hearing is grossly intact.  Mouth: No obvious abnormalities to the lips. The teeth are unremarkable. The gingivae are without any obvious evidence of infection or lesion. The oral mucosa is moist and pink. There are no obvious masses to the hard or soft palate.   Oropharynx: The uvula is midline.  The tongue is midline. The posterior pharynx is without erythema or exudate. The tonsils are normal appearing.  Salivary glands: The salivary glands are symmetric and not enlarged, no masses  Neck: No lymphadenopathy, trachea midline, thryoid not enlarged.  Psych: Normal mood and affect.   Neuro: Grossly intact  Speech: fluent         Assessment and Plan:       1. Sore in nose        Mupirocin BID 10 days    RTC: PRn    Plan of care was discussed in detail with the patient, who agreed with the plan as above. All questions were answered in detail.     Rebecca Rothman MD  Otolaryngology

## 2024-04-15 DIAGNOSIS — M25.512 LEFT SHOULDER PAIN, UNSPECIFIED CHRONICITY: Primary | ICD-10-CM

## 2024-04-17 ENCOUNTER — HOSPITAL ENCOUNTER (OUTPATIENT)
Dept: RADIOLOGY | Facility: HOSPITAL | Age: 64
Discharge: HOME | End: 2024-04-17
Attending: ORTHOPAEDIC SURGERY
Payer: COMMERCIAL

## 2024-04-17 ENCOUNTER — OFFICE VISIT (OUTPATIENT)
Dept: ORTHOPEDICS | Facility: CLINIC | Age: 64
End: 2024-04-17
Payer: COMMERCIAL

## 2024-04-17 VITALS — HEIGHT: 60 IN | BODY MASS INDEX: 24.37 KG/M2 | WEIGHT: 124.13 LBS

## 2024-04-17 DIAGNOSIS — M75.22 BICEPS TENDINITIS OF LEFT SHOULDER: Primary | ICD-10-CM

## 2024-04-17 DIAGNOSIS — M25.512 LEFT SHOULDER PAIN, UNSPECIFIED CHRONICITY: ICD-10-CM

## 2024-04-17 PROCEDURE — 1159F MED LIST DOCD IN RCRD: CPT | Mod: CPTII,S$GLB,, | Performed by: ORTHOPAEDIC SURGERY

## 2024-04-17 PROCEDURE — 99204 OFFICE O/P NEW MOD 45 MIN: CPT | Mod: 25,S$GLB,, | Performed by: ORTHOPAEDIC SURGERY

## 2024-04-17 PROCEDURE — 73030 X-RAY EXAM OF SHOULDER: CPT | Mod: TC,PO,LT

## 2024-04-17 PROCEDURE — 1160F RVW MEDS BY RX/DR IN RCRD: CPT | Mod: CPTII,S$GLB,, | Performed by: ORTHOPAEDIC SURGERY

## 2024-04-17 PROCEDURE — 73030 X-RAY EXAM OF SHOULDER: CPT | Mod: 26,LT,, | Performed by: RADIOLOGY

## 2024-04-17 PROCEDURE — 3008F BODY MASS INDEX DOCD: CPT | Mod: CPTII,S$GLB,, | Performed by: ORTHOPAEDIC SURGERY

## 2024-04-17 PROCEDURE — 20610 DRAIN/INJ JOINT/BURSA W/O US: CPT | Mod: LT,S$GLB,, | Performed by: ORTHOPAEDIC SURGERY

## 2024-04-17 PROCEDURE — 99999 PR PBB SHADOW E&M-EST. PATIENT-LVL III: CPT | Mod: PBBFAC,,, | Performed by: ORTHOPAEDIC SURGERY

## 2024-04-17 RX ORDER — TRIAMCINOLONE ACETONIDE 40 MG/ML
40 INJECTION, SUSPENSION INTRA-ARTICULAR; INTRAMUSCULAR
Status: DISCONTINUED | OUTPATIENT
Start: 2024-04-17 | End: 2024-04-17 | Stop reason: HOSPADM

## 2024-04-17 RX ADMIN — TRIAMCINOLONE ACETONIDE 40 MG: 40 INJECTION, SUSPENSION INTRA-ARTICULAR; INTRAMUSCULAR at 08:04

## 2024-04-17 NOTE — PROCEDURES
Large Joint Aspiration/Injection: L subacromial bursa    Date/Time: 4/17/2024 8:00 AM    Performed by: Mikey Amaro MD  Authorized by: Mikey Amaro MD    Consent Done?:  Yes (Verbal)  Indications:  Pain  Site marked: the procedure site was marked    Timeout: prior to procedure the correct patient, procedure, and site was verified      Local anesthesia used?: Yes    Local anesthetic: Ropivicaine.  Anesthetic total (ml):  3      Details:  Needle Size:  20 G  Ultrasonic Guidance for needle placement?: No    Approach:  Posterior  Location:  Shoulder  Site:  L subacromial bursa  Medications:  40 mg triamcinolone acetonide 40 mg/mL  Patient tolerance:  Patient tolerated the procedure well with no immediate complications

## 2024-04-17 NOTE — PROGRESS NOTES
Past Medical History:   Diagnosis Date    Abnormal Pap smear of cervix     in early 1990s---cone biopsy which was negative---all pap normal since        Past Surgical History:   Procedure Laterality Date    CHONDROPLASTY Left 12/11/2020    Procedure: CHONDROPLASTY;  Surgeon: Mikey Amaro MD;  Location: [facility] OR;  Service: Orthopedics;  Laterality: Left;    COLONOSCOPY  2014    KNEE ARTHROSCOPY W/ MENISCECTOMY Left 12/11/2020    Procedure: ARTHROSCOPY, KNEE, WITH MENISCECTOMY and a baker cyst aspiration;  Surgeon: Mikye Amaro MD;  Location: [facility] OR;  Service: Orthopedics;  Laterality: Left;  medial     KNEE SURGERY Bilateral        Current Outpatient Medications   Medication Sig Dispense Refill    atorvastatin (LIPITOR) 40 MG tablet Take 1 tablet (40 mg total) by mouth once daily. 90 tablet 3    mupirocin (BACTROBAN) 2 % ointment Apply pea sized amount to inside of nostrils twice daily for 10 days 30 g 1    nitroGLYCERIN (NITROSTAT) 0.4 MG SL tablet Place 1 tablet (0.4 mg total) under the tongue every 5 (five) minutes as needed for Chest pain. 30 tablet 1     No current facility-administered medications for this visit.       Review of patient's allergies indicates:   Allergen Reactions    Ciprofloxacin Anaphylaxis    Darvocet a500 [propoxyphene n-acetaminophen] Hives    Oxycodone Hives       Family History   Problem Relation Name Age of Onset    Heart disease Father      Skin cancer Father      Aortic aneurysm Father      Dementia Mother      No Known Problems Sister         Social History     Socioeconomic History    Marital status:    Tobacco Use    Smoking status: Never    Smokeless tobacco: Never   Substance and Sexual Activity    Alcohol use: Yes     Comment: once a week    Drug use: No    Sexual activity: Yes     Partners: Male   Social History Narrative    Moves a lot    From [place]     Social Determinants of Health     Financial Resource Strain: Low Risk  (3/14/2024)    Overall  Financial Resource Strain (CARDIA)     Difficulty of Paying Living Expenses: Not hard at all   Food Insecurity: Unknown (3/14/2024)    Hunger Vital Sign     Worried About Running Out of Food in the Last Year: Never true     Ran Out of Food in the Last Year: Patient declined   Transportation Needs: No Transportation Needs (3/14/2024)    PRAPARE - Transportation     Lack of Transportation (Medical): No     Lack of Transportation (Non-Medical): No   Physical Activity: Sufficiently Active (3/14/2024)    Exercise Vital Sign     Days of Exercise per Week: 5 days     Minutes of Exercise per Session: 40 min   Stress: Patient Declined (3/14/2024)    [place] [employer] - Occupational Stress Questionnaire     Feeling of Stress : Patient declined   Social Connections: Unknown (3/14/2024)    Social Connection and Isolation Panel [NHANES]     Frequency of Communication with Friends and Family: Patient declined     Active Member of Clubs or Organizations: No     Attends Club or Organization Meetings: Patient declined     Marital Status:    Housing Stability: Patient Declined (3/14/2024)    Housing Stability Vital Sign     Unable to Pay for Housing in the Last Year: Patient declined     Unstable Housing in the Last Year: Patient declined       Chief Complaint: No chief complaint on file.      History of present illness:  64-year-old right-hand-dominant female seen for left shoulder and biceps pain.  Pain started back in March.  No specific injury or trauma.  Patient has limited and painful range of motion.  Pain when sleeping or swimming.  Pain is located along the front of the shoulder around the proximal biceps tendon.  Pain is a 3/10.  Pain with abduction as well.        Review of Systems:    Constitution: Negative for chills, fever, and sweats.  Negative for unexplained weight loss.    HENT:  Negative for headaches and blurry vision.    Cardiovascular:Negative for chest pain or irregular heart beat.  Negative for hypertension.    Respiratory:  Negative for cough and shortness of breath.    Gastrointestinal: Negative for abdominal pain, heartburn, melena, nausea, and vomitting.    Genitourinary:  Negative bladder incontinence and dysuria.    Musculoskeletal:  See HPI    Neurological: Negative for numbness.    Psychiatric/Behavioral: Negative for depression.  The patient is not nervous/anxious.      Endocrine: Negative for polyuria    Hematologic/Lymphatic: Negative for bleeding problem.  Does not bruise/bleed easily.    Skin: Negative for poor would healing and rash      Physical Examination:    Vital Signs:  There were no vitals filed for this visit.    There is no height or weight on file to calculate BMI.    This a well-developed, well nourished patient in no acute distress.  They are alert and oriented and cooperative to examination.  Pt. walks without an antalgic gait.      Examination of theLeft shoulder shows no rashes or erythema. There are no masses, ecchymosis, or atrophy. The patient has full range of motion in forward flexion, external rotation, and internal rotation to the mid T-spine. The patient has - Uriostegui test.  Positive Neer - [place]'s test.  Positive Speeds test. Nontender to palpation over a.c. joint. Normal stability anteriorly, posteriorly, and negative sulcus sign. Passive range of motion: Forward flexion of 180°, external rotation at 90° of 90°, internal rotation of 50°, and external rotation at 0° of 50°. 2+ radial pulse. Intact axillary, radial, median and ulnar sensation. 5 out of 5 resisted forward flexion, external rotation, and negative lift off test.      X-rays:  X-rays of the left shoulder ordered and reviewed which show some very minimal cystic change of the greater tuberosity and possible a very small inferior humeral spur         Assessment:  Left proximal biceps tendinitis and impingement syndrome    Plan:  I reviewed the findings with her today.  Recommended a subacromial  injection.  We talked about the down sides and possible rupture of it an actual biceps sheath injection so we will avoid that for now.  Also gave her a Thera-Band and a rotator cuff and shoulder conditioning guide to work on.            All previous pertinent notes including ER visits, physical therapy visits, other orthopedic visits as well as other care for the same musculoskeletal problem were reviewed.  All pertinent lab values and previous imaging was reviewed pertinent to the current visit.    This note was created using M Modal voice recognition software that occasionally misinterpreted phrases or words.    Consult note is delivered via Epic messaging service.

## 2024-06-03 ENCOUNTER — TELEPHONE (OUTPATIENT)
Dept: ORTHOPEDICS | Facility: CLINIC | Age: 64
End: 2024-06-03
Payer: COMMERCIAL

## 2024-06-03 DIAGNOSIS — M75.22 BICEPS TENDINITIS OF LEFT SHOULDER: ICD-10-CM

## 2024-06-03 DIAGNOSIS — M25.512 LEFT SHOULDER PAIN, UNSPECIFIED CHRONICITY: Primary | ICD-10-CM

## 2024-06-03 NOTE — TELEPHONE ENCOUNTER
----- Message from Mikey Amaro MD sent at 6/3/2024 11:44 AM CDT -----  Contact: Patient  yes  ----- Message -----  From: Roldan Salmeron  Sent: 6/3/2024  11:41 AM CDT  To: Mikey Amaro MD    Ok to order MRI shoulder for patient?     Asa  ----- Message -----  From: Leesa Leblanc  Sent: 6/3/2024   8:54 AM CDT  To: Prem Delaney Staff    Caller is requesting to schedule their MRI  Order is not listed in EPIC.  Please enter order and contact patient to schedule.    Name of Caller:Patient       Preferred Date and Time  ASAP    Date of EPP Appointment:NA    Would the patient rather a call back or a response via My [facility]? Call      Best Call Back Number:[phone] (home)       Additional Information:Patient is requesting to schedule MRI. No MRI orders in [facility]

## 2024-06-04 ENCOUNTER — TELEPHONE (OUTPATIENT)
Dept: ORTHOPEDICS | Facility: CLINIC | Age: 64
End: 2024-06-04
Payer: COMMERCIAL

## 2024-06-04 DIAGNOSIS — M75.22 BICEPS TENDINITIS OF LEFT SHOULDER: Primary | ICD-10-CM

## 2024-06-04 NOTE — TELEPHONE ENCOUNTER
----- Message from Fabrizio Bueno MA sent at 6/4/2024  3:07 PM CDT -----  Contact: [employer]  FYI--Patient would like her MRI order faxed to: [phone], [facility]

## 2024-06-26 ENCOUNTER — OFFICE VISIT (OUTPATIENT)
Dept: ORTHOPEDICS | Facility: CLINIC | Age: 64
End: 2024-06-26
Payer: COMMERCIAL

## 2024-06-26 VITALS — RESPIRATION RATE: 18 BRPM | BODY MASS INDEX: 24.37 KG/M2 | WEIGHT: 124.13 LBS | HEIGHT: 60 IN

## 2024-06-26 DIAGNOSIS — M75.22 BICEPS TENDINITIS OF LEFT SHOULDER: Primary | ICD-10-CM

## 2024-06-26 DIAGNOSIS — M75.100 ROTATOR CUFF SYNDROME, UNSPECIFIED LATERALITY: ICD-10-CM

## 2024-06-26 DIAGNOSIS — M25.512 LEFT SHOULDER PAIN, UNSPECIFIED CHRONICITY: ICD-10-CM

## 2024-06-26 PROCEDURE — 99999 PR PBB SHADOW E&M-EST. PATIENT-LVL III: CPT | Mod: PBBFAC,,, | Performed by: ORTHOPAEDIC SURGERY

## 2024-06-26 PROCEDURE — 3008F BODY MASS INDEX DOCD: CPT | Mod: CPTII,S$GLB,, | Performed by: ORTHOPAEDIC SURGERY

## 2024-06-26 PROCEDURE — 99214 OFFICE O/P EST MOD 30 MIN: CPT | Mod: S$GLB,,, | Performed by: ORTHOPAEDIC SURGERY

## 2024-06-26 PROCEDURE — 1160F RVW MEDS BY RX/DR IN RCRD: CPT | Mod: CPTII,S$GLB,, | Performed by: ORTHOPAEDIC SURGERY

## 2024-06-26 PROCEDURE — 1159F MED LIST DOCD IN RCRD: CPT | Mod: CPTII,S$GLB,, | Performed by: ORTHOPAEDIC SURGERY

## 2024-06-26 NOTE — PROGRESS NOTES
Past Medical History:   Diagnosis Date    Abnormal Pap smear of cervix     in early 1990s---cone biopsy which was negative---all pap normal since        Past Surgical History:   Procedure Laterality Date    CHONDROPLASTY Left 12/11/2020    Procedure: CHONDROPLASTY;  Surgeon: Mikey Amaro MD;  Location: [facility] OR;  Service: Orthopedics;  Laterality: Left;    COLONOSCOPY  2014    KNEE ARTHROSCOPY W/ MENISCECTOMY Left 12/11/2020    Procedure: ARTHROSCOPY, KNEE, WITH MENISCECTOMY and a baker cyst aspiration;  Surgeon: Mikey Amaro MD;  Location: [facility] OR;  Service: Orthopedics;  Laterality: Left;  medial     KNEE SURGERY Bilateral        Current Outpatient Medications   Medication Sig    mupirocin (BACTROBAN) 2 % ointment Apply pea sized amount to inside of nostrils twice daily for 10 days    atorvastatin (LIPITOR) 40 MG tablet Take 1 tablet (40 mg total) by mouth once daily.    nitroGLYCERIN (NITROSTAT) 0.4 MG SL tablet Place 1 tablet (0.4 mg total) under the tongue every 5 (five) minutes as needed for Chest pain.     No current facility-administered medications for this visit.       Review of patient's allergies indicates:   Allergen Reactions    Ciprofloxacin Anaphylaxis    Darvocet a500 [propoxyphene n-acetaminophen] Hives    Oxycodone Hives       Family History   Problem Relation Name Age of Onset    Heart disease Father      Skin cancer Father      Aortic aneurysm Father      Dementia Mother      No Known Problems Sister         Social History     Socioeconomic History    Marital status:    Tobacco Use    Smoking status: Never    Smokeless tobacco: Never   Substance and Sexual Activity    Alcohol use: Yes     Comment: once a week    Drug use: No    Sexual activity: Yes     Partners: Male   Social History Narrative    Moves a lot    From [place]     Social Determinants of Health     Financial Resource Strain: Patient Declined (6/20/2024)    Overall Financial Resource Strain (CARDIA)      Difficulty of Paying Living Expenses: Patient declined   Food Insecurity: Patient Declined (6/20/2024)    Hunger Vital Sign     Worried About Running Out of Food in the Last Year: Patient declined     Ran Out of Food in the Last Year: Patient declined   Transportation Needs: No Transportation Needs (3/14/2024)    PRAPARE - Transportation     Lack of Transportation (Medical): No     Lack of Transportation (Non-Medical): No   Physical Activity: Sufficiently Active (6/20/2024)    Exercise Vital Sign     Days of Exercise per Week: 5 days     Minutes of Exercise per Session: 40 min   Stress: Stress Concern Present (6/20/2024)    [employer] [place] [employer] - Occupational Stress Questionnaire     Feeling of Stress : To some extent   Housing Stability: Patient Declined (3/14/2024)    Housing Stability Vital Sign     Unable to Pay for Housing in the Last Year: Patient declined     Unstable Housing in the Last Year: Patient declined       Chief Complaint:   Chief Complaint   Patient presents with    Follow-up     Follow up post MRI left shoulder        History of present illness:  64-year-old right-hand-dominant female seen for left shoulder and biceps pain.  Pain started back in March.  No specific injury or trauma.  Patient has limited and painful range of motion.  Pain when sleeping or swimming.  Pain is located along the front of the shoulder around the proximal biceps tendon.  Pain is a 3/10.  Pain with abduction as well.  Pain is getting worse.  We got an MRI which showed no traumatic tearing.  She has tried a subacromial injection already.        Review of Systems:    Musculoskeletal:  See HPI        Physical Examination:    Vital Signs:    Vitals:    06/26/24 0827   Resp: 18       Body mass index is 24.24 kg/m².    This a well-developed, well nourished patient in no acute distress.  They are alert and oriented and cooperative to examination.  Pt. walks without an antalgic gait.      Examination of the  Left shoulder shows no rashes or erythema. There are no masses, ecchymosis, or atrophy. The patient has full range of motion in forward flexion, external rotation, and internal rotation to the mid T-spine. The patient has - Uriostegui test.  Positive Neer minimally positive [place]'s test.  Positive Speeds test. Nontender to palpation over a.c. joint. Normal stability anteriorly, posteriorly, and negative sulcus sign. Passive range of motion: Forward flexion of 180°, external rotation at 90° of 90°, internal rotation of 50°, and external rotation at 0° of 50°. 2+ radial pulse. Intact axillary, radial, median and ulnar sensation. 5 out of 5 resisted forward flexion, external rotation, and negative lift off test.      X-rays:  X-rays of the left shoulder  reviewed which show some very minimal cystic change of the greater tuberosity and possible a very small inferior humeral spur    MRI of the left shoulder from [facility] dated June 18, 2024 is reviewed and interpreted:  Rotator cuff tendinopathy and small tear of the articular surface measures 6 x 8 millimeters in about 50% thickness of the supraspinatus.  Patient also has a smaller articular sided tear of about 10-20% of the thickness of the subscapularis.  Slap tear with minor biceps tendinitis.     Assessment:  Left proximal biceps tendinitis and partial-thickness articular sided rotator cuff tearing    Plan:  I reviewed the findings with her today.  Recommended formal physical therapy for the biceps and rotator cuff.  Patient will follow up in 6-8 weeks.            All previous pertinent notes including ER visits, physical therapy visits, other orthopedic visits as well as other care for the same musculoskeletal problem were reviewed.  All pertinent lab values and previous imaging was reviewed pertinent to the current visit.    This note was created using Meiyou voice recognition software that occasionally misinterpreted phrases or words.    Consult note is delivered via  Epic messaging service.

## 2024-06-27 ENCOUNTER — CLINICAL SUPPORT (OUTPATIENT)
Dept: REHABILITATION | Facility: HOSPITAL | Age: 64
End: 2024-06-27
Attending: ORTHOPAEDIC SURGERY
Payer: COMMERCIAL

## 2024-06-27 DIAGNOSIS — M62.81 MUSCLE WEAKNESS: ICD-10-CM

## 2024-06-27 DIAGNOSIS — R26.89 DECREASED FUNCTIONAL MOBILITY: ICD-10-CM

## 2024-06-27 DIAGNOSIS — M75.22 BICEPS TENDINITIS OF LEFT SHOULDER: ICD-10-CM

## 2024-06-27 DIAGNOSIS — M75.100 ROTATOR CUFF SYNDROME, UNSPECIFIED LATERALITY: ICD-10-CM

## 2024-06-27 DIAGNOSIS — M25.60 DECREASED RANGE OF MOTION: Primary | ICD-10-CM

## 2024-06-27 PROCEDURE — 97110 THERAPEUTIC EXERCISES: CPT | Mod: PN

## 2024-06-27 PROCEDURE — 97161 PT EVAL LOW COMPLEX 20 MIN: CPT | Mod: PN

## 2024-06-27 NOTE — PLAN OF CARE
[facility]  Physical Therapy Initial Evaluation    Name: Jess Gresham  Clinic Number: [id number]    Therapy Diagnosis:   Encounter Diagnoses   Name Primary?    Biceps tendinitis of left shoulder     Rotator cuff syndrome, unspecified laterality     Decreased range of motion Yes    Muscle weakness     Decreased functional mobility      Physician: Mikey Amaro,*    Physician Orders: PT Eval and Treat left shoulder biceps tendonitis/partial rotator cuff tear   Medical Diagnosis from Referral: Biceps tendinitis of left shoulder [M75.22], Rotator cuff syndrome, unspecified laterality   Evaluation Date: 6/27/2024  Authorization Period Expiration: 6/26/25  Plan of Care Expiration: 8/23/24  Visit # / Visits authorized: 1/ 1  FOTO:1/3: 48; goal: 66 by visit 12    Time In: 9:45  Time Out: 10:30  Total Billable Time: 45 minutes    Precautions: Standard    Subjective   Date of onset: Mach 2024  History of current condition - Jess reports: she was trying to remove a tight sport bra over head.  She states she has pain to L shoulder with reaching, swimming.  She states Advil helps, but she does not like to take that much.     Medical History:   Past Medical History:   Diagnosis Date    Abnormal Pap smear of cervix     in early 1990s---cone biopsy which was negative---all pap normal since        Surgical History:   Jess Gresham  has a past surgical history that includes Knee surgery (Bilateral); Colonoscopy (2014); Knee arthroscopy w/ meniscectomy (Left, 12/11/2020); and Chondroplasty (Left, 12/11/2020).    Medications:   Jess has a current medication list which includes the following prescription(s): atorvastatin and mupirocin.    Allergies:   Review of patient's allergies indicates:   Allergen Reactions    Ciprofloxacin Anaphylaxis    Darvocet a500 [propoxyphene n-acetaminophen] Hives    Oxycodone Hives        Imaging, MRI studies: per MD note: MRI of the left shoulder from [facility] dated  June 18, 2024 is reviewed and interpreted:  Rotator cuff tendinopathy and small tear of the articular surface measures 6 x 8 millimeters in about 50% thickness of the supraspinatus.  Patient also has a smaller articular sided tear of about 10-20% of the thickness of the subscapularis.  Slap tear with minor biceps tendinitis.     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)  Prior Level of Function: I with ADLs, work and swim  Current Level of Function: pain with overhead activities, swim, lifting, running    Pain:  Current 6/10, worst 8/10, best 2/10   Location: left bicep, anterior L shoulder  Description: Sharp, dull, aching  Aggravating Factors: reaching, lifting, swim, running  Easing Factors: Advil, rest    Pts goals: decreased L shoulder pain, be able to return to swimming and running    Objective     Observation: pt is pleasant and cooperative    Posture: fwd head, rounded shoulders      Passive Range of Motion: (deg)  Shoulder Left Right   Flexion 170 wfl   Abduction 165 pinch wfl   ER at 0 72 pain wfl   ER at 90 Wfl, no pain wfl   IR at 90 75 pain wfl      Active Range of Motion: (deg)  Shoulder Left Right   Flexion 160 pain 180   Abduction 173 pain 176   ER at 0 74 pain 85   ER at 90 110 pain 114   IR T9 pain T8     Upper Extremity Strength  (L) UE  (R) UE    Shoulder flexion: 4/5 slight pain Shoulder flexion: 5/5   Shoulder Abduction: 3/5 pain Shoulder abduction: 5/5   Shoulder ER 4/5 pain Shoulder ER 5/5   Shoulder IR 3+/5 pain Shoulder IR 5/5       Special Tests:  AC Joint Left Right   Speed's test + -     Joint Mobility: hypermobility to L GH jt    Palpation: TTP to L bicep tendon and coracoid process    Sensation: grossly intact to light touch    Intake Outcome Measure for FOTO shoulder Survey  Therapist reviewed FOTO scores for Jess Gresham on 6/27/2024.  FOTO report- see Media section or FOTO  account episode details.  Intake Score : 48%    TREATMENT   Treatment Time In: 10:15  Treatment Time Out: 10:30  Total Treatment time separate from Evaluation: 15 minutes    Jess received therapeutic exercises to develop strength, ROM, and flexibility for 15 minutes including:  Supine AA sh flex 1# dowel 10x10 sec --pt required cues to stay in pain free ROM  Table slides for sh flex 10x10 sec  Posterior sh rolls x10  Arm hang  Bicep stretch in doorway 3x20 sec  Scap retraction x10, 5 sec hold    May add: assess periscap strength, postural edu and strengthening, sup sh stabilization at 90 deg flex, sh IR and ER strengthening, pulleys      Home Exercises and Patient Education Provided    Education provided:   - avoid painful activities; may swim with kick board (no UE at this time)  -may perform ice massage for decreased bicep pain and inflammation  Pt gave verbal understanding to all education provided     Written Home Exercises Provided: yes.  Exercises were reviewed and Jess was able to demonstrate them prior to the end of the session.  Jess demonstrated good  understanding of the education provided.     See EMR under Patient Instructions for exercises provided 6/27/24.    Assessment   Jess is a 64 y.o. female referred to outpatient Physical Therapy with a medical diagnosis of Biceps tendinitis of left shoulder [M75.22], Rotator cuff syndrome, unspecified laterality . Pt presents with decreased L shoulder ROM, decreased L shoulder strength, difficulty with overhead reaching, lifting, swimming.  She required cues to stay within pain free ROM.  She will benefit from PT for improved L shoulder ROM, strength and stabilization for improved functional mobility.    Pt prognosis is Good.   Pt will benefit from skilled outpatient Physical Therapy to address the deficits stated above and in the chart below, provide pt/family education, and to maximize pt's level of independence.     Plan of care discussed with  patient: Yes  Pt's spiritual, cultural and educational needs considered and patient is agreeable to the plan of care and goals as stated below:     Anticipated Barriers for therapy: none    Medical Necessity is demonstrated by the following  History  Co-morbidities and personal factors that may impact the plan of care [] LOW: no personal factors / co-morbidities  [x] MODERATE: 1-2 personal factors / co-morbidities  [] HIGH: 3+ personal factors / co-morbidities    Moderate / High Support Documentation:   Co-morbidities affecting plan of care:      Personal Factors:   lifestyle     Examination  Body Structures and Functions, activity limitations and participation restrictions that may impact the plan of care [] LOW: addressing 1-2 elements  [] MODERATE: 3+ elements  [x] HIGH: 4+ elements (please support below)    Moderate / High Support Documentation: pain, decreased ROM, decreased strength, difficulty with reaching, lifting, swimming, running     Clinical Presentation [x] LOW: stable  [] MODERATE: Evolving  [] HIGH: Unstable     Decision Making/ Complexity Score: low       GOALS: Short Term Goals:  4 weeks (progressing, not met)  1.Report decreased    L shoulder    pain  <   / =  3  /10  to increase tolerance for ADLs  2. Increased L shoulder flexion AROM to at least 170 deg for increased ease with ADLs.  3. Increased L shoulder abduction AROM to at least 175 deg without pain for increased ease with ADLs..  4.  Increased L UE strength by 1/3 muscle grade in all deficient planes to increase tolerance for ADL and work activities.  5. Pt to tolerate HEP to improve ROM and independence with ADL's  6. L shoulder ER at 0 deg abd AROM to at least 75 deg for increased ease with ADLs.    Long Term Goals: 8 weeks (progressing, not met)  1.Report decreased    L shoulder    pain  <   / =  1  /10  to increase tolerance for ADLs  2. Increased L shoulder flexion AROM to at least 180 deg for increased ease with ADLs.  3. L shoulder  ER at 0 deg abduction AROM to at least 80 deg for increased ease with ADLs.  4.  Increased L UE strength by 1 muscle grade in all deficient planes to increase tolerance for ADL and work activities.  5. Pt to tolerate HEP to improve ROM and independence with ADL's  6. L shoulder IR AROM to at least T9 without pain for increased ease with donning bra.        Plan   Plan of care Certification: 6/27/2024 to 8/23/24.    Outpatient Physical Therapy 1-2 times weekly for 8 weeks to include the following interventions: Electrical Stimulation  , Manual Therapy, Moist Heat/ Ice, Neuromuscular Re-ed, Patient Education, Self Care, Therapeutic Activities, Therapeutic Exercise, Ultrasound, and dry needling.     Patsy Mejia, PT

## 2024-07-01 NOTE — PROGRESS NOTES
NEW PATIENT    HISTORY OF PRESENT ILLNESS   64 y.o. 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  She was referred to me from a former patient for 2nd opinion.  She states she started to notice shoulder pain 4 months ago. She does not recall a specific event that caused pain, but has noticed progressive pain while swimming. She states she noticed significant pain when taking off a sports bra. She has a previous CSI 8 weeks ago [facility] with zero relief.  She wants to continue being very active and competitive with swimming.    + mechanical symptoms, - instability    Is affecting ADLs.  Pain is 6/10 at it's worst.        PAST MEDICAL HISTORY    Past Medical History:   Diagnosis Date    Abnormal Pap smear of cervix     in early 1990s---cone biopsy which was negative---all pap normal since        PAST SURGICAL HISTORY     Past Surgical History:   Procedure Laterality Date    CHONDROPLASTY Left 12/11/2020    Procedure: CHONDROPLASTY;  Surgeon: Mikey Amaro MD;  Location: [facility] OR;  Service: Orthopedics;  Laterality: Left;    COLONOSCOPY  2014    KNEE ARTHROSCOPY W/ MENISCECTOMY Left 12/11/2020    Procedure: ARTHROSCOPY, KNEE, WITH MENISCECTOMY and a baker cyst aspiration;  Surgeon: Mikey Amaro MD;  Location: [facility] OR;  Service: Orthopedics;  Laterality: Left;  medial     KNEE SURGERY Bilateral        FAMILY HISTORY    Family History   Problem Relation Name Age of Onset    Heart disease Father      Skin cancer Father      Aortic aneurysm Father      Dementia Mother      No Known Problems Sister         SOCIAL HISTORY    Social History     Socioeconomic History    Marital status:    Tobacco Use    Smoking status: Never    Smokeless tobacco: Never   Substance and Sexual Activity    Alcohol use: Yes     Comment: once a week    Drug use: No    Sexual activity: Yes     Partners: Male   Social History Narrative    Moves a lot    From [place]     Social Determinants of  Health     Financial Resource Strain: Patient Declined (6/20/2024)    Overall Financial Resource Strain (CARDIA)     Difficulty of Paying Living Expenses: Patient declined   Food Insecurity: Patient Declined (6/20/2024)    Hunger Vital Sign     Worried About Running Out of Food in the Last Year: Patient declined     Ran Out of Food in the Last Year: Patient declined   Transportation Needs: No Transportation Needs (3/14/2024)    PRAPARE - Transportation     Lack of Transportation (Medical): No     Lack of Transportation (Non-Medical): No   Physical Activity: Sufficiently Active (6/20/2024)    Exercise Vital Sign     Days of Exercise per Week: 5 days     Minutes of Exercise per Session: 40 min   Stress: Stress Concern Present (6/20/2024)    [place] [employer] - Occupational Stress Questionnaire     Feeling of Stress : To some extent   Housing Stability: Patient Declined (3/14/2024)    Housing Stability Vital Sign     Unable to Pay for Housing in the Last Year: Patient declined     Unstable Housing in the Last Year: Patient declined       MEDICATIONS      Current Outpatient Medications:     ibuprofen 20 mg/mL oral liquid, Take by mouth every 6 (six) hours as needed for Temperature greater than., Disp: , Rfl:     mupirocin (BACTROBAN) 2 % ointment, Apply pea sized amount to inside of nostrils twice daily for 10 days, Disp: 30 g, Rfl: 1    atorvastatin (LIPITOR) 40 MG tablet, Take 1 tablet (40 mg total) by mouth once daily., Disp: 90 tablet, Rfl: 3    ALLERGIES     Review of patient's allergies indicates:   Allergen Reactions    Ciprofloxacin Anaphylaxis    Darvocet a500 [propoxyphene n-acetaminophen] Hives    Oxycodone Hives         REVIEW OF SYSTEMS   Constitution: Negative. Negative for chills, fever and night sweats.   HENT: Negative for congestion and headaches.    Eyes: Negative for blurred vision, left vision loss and right vision loss.   Cardiovascular: Negative for chest pain and syncope.    Respiratory: Negative for cough and shortness of breath.    Endocrine: Negative for polydipsia, polyphagia and polyuria.   Hematologic/Lymphatic: Negative for bleeding problem. Does not bruise/bleed easily.   Skin: Negative for dry skin, itching and rash.   Musculoskeletal: Negative for falls. Positive for left shoulder pain   Gastrointestinal: Negative for abdominal pain and bowel incontinence.   Genitourinary: Negative for bladder incontinence and nocturia.   Neurological: Negative for disturbances in coordination, loss of balance and seizures.   Psychiatric/Behavioral: Negative for depression. The patient does not have insomnia.    Allergic/Immunologic: Negative for hives and persistent infections.     PHYSICAL EXAMINATION    Vitals: BP (!) 160/100 Comment: patient says white coat syndrome  Pulse 66   Wt 55 kg (121 lb 4.1 oz)   LMP 02/01/2013   BMI 23.68 kg/m²     General: The patient appears active and healthy with no apparent physical problems.  No disturbance of mood or affect is demonstrated. Alert and Oriented.    HEENT: Eyes normal, pupils equally round, nose normal.    Resp: Equal and symmetrical chest rises. No wheezing    CV: Regular rate    Neck: Supple; nonpainful range of motion.    Extremities: no cyanosis, clubbing, edema, or diffuse swelling.  Palpable pulses, good capillary refill of the digits.  No coolness, discoloration, edema or obvious varicosities.    Skin: no lesions noted.    Lymphatic: no detected adenopathy in the upper or lower extremities.    Neurologic: normal mental status, normal reflexes, normal gait and balance.  Patient is alert and oriented to person, place and time.  No flaccidity or spasticity is noted.  No motor or sensory deficits are noted.  Light touch is intact    Orthopaedic: SHOULDER EXAM - LEFT    Inspection:   Normal skin color and appearance with no scars.  No muscle atrophy noted.  No scapular winging.      Palpation: No tenderness of the acromioclavicular  joint, lateral edge of the acromion, , trapezius muscle or scapulothoracic bursa.  + tenderness biceps tendon    ROM:      PROM:     FE - 180°    Abd/ER -  90° with pain  Abd/IR -  45°   Add/ER -  60° With pain     AROM:    FE - 180°    Abd/ER -  90°  Abd/IR -  45°   Add/ER -  60°         Tests:     + Uriostegui, + Neer's, - Cross Arm Adduction, - Cucumber, - Yerguson,  + Speed. - Belly Press,  +Jobes, - Lift Off    Stability: - sulcus, - apprehension, - relocation, - load and shift, + DLS      Motor:  Rotator cuff strength is 4/5 supraspinatus, 5/5 infraspinatus, 5/5 subscapularis. Biceps, triceps and deltoid strength is 5/5.      Neuro     Distally there are no paresthesias, and sensation is intact to light touch in the median, ulnar, and radial distributions.  Reflexes are 2/2 biceps, triceps and brachioradialis.        IMAGING    I reviewed an outside MRI which demonstrates a high-grade tear involving the anterior surface of the supraspinatus with significant biceps tenosynovitis partial-thickness upper border subscapularis tendon tearing.  No significant glenohumeral joint chondral changes.  Small degenerative SLAP tear noted.    IMPRESSION       ICD-10-CM ICD-9-CM   1. Rotator cuff tear, non-traumatic, left  M75.102 727.61   2. Left shoulder pain, unspecified chronicity  M25.512 719.41   3. Biceps tendinopathy, left  M67.922 727.9       MEDICATIONS PRESCRIBED          RECOMMENDATIONS     Surgical versus non-surgical options discussed today; left shoulder arthroscopy with rotator cuff repair, extensive debridement, sub-acromial decompression, and possible open sub-pectoral biceps tenodesis versus arthroscopic biceps tenodesis  The patient elected to proceed with operative intervention after all risks, benefits, and alternatives were discussed with the patient.  The risks of surgery include bleeding, scar formation, injuries to nerves, arteries and veins, need for additional surgeries, blood clots, infections,  inability to return to the same level of the performance and the risk of anesthesia.   We had a long discussion concerning surgery versus further nonsurgical treatment.  I advised her we can continue nonoperative management as recommended by her previous physician which I believe is reasonable.  She may need an ultrasound-guided injection for her glenohumeral joint and possibly biceps tendon sheath where the majority of her symptoms are likely emanating from.  She wanted to proceed with surgery.      All questions were answered, pt will contact us for questions or concerns in the interim.

## 2024-07-08 ENCOUNTER — HOSPITAL ENCOUNTER (OUTPATIENT)
Dept: RADIOLOGY | Facility: HOSPITAL | Age: 64
Discharge: HOME | End: 2024-07-08
Attending: ORTHOPAEDIC SURGERY
Payer: COMMERCIAL

## 2024-07-08 ENCOUNTER — OFFICE VISIT (OUTPATIENT)
Dept: SPORTS MEDICINE | Facility: CLINIC | Age: 64
End: 2024-07-08
Payer: COMMERCIAL

## 2024-07-08 VITALS
SYSTOLIC BLOOD PRESSURE: 160 MMHG | HEART RATE: 66 BPM | WEIGHT: 121.25 LBS | DIASTOLIC BLOOD PRESSURE: 100 MMHG | BODY MASS INDEX: 23.68 KG/M2

## 2024-07-08 DIAGNOSIS — M25.512 LEFT SHOULDER PAIN, UNSPECIFIED CHRONICITY: ICD-10-CM

## 2024-07-08 DIAGNOSIS — M75.102 ROTATOR CUFF TEAR, NON-TRAUMATIC, LEFT: Primary | ICD-10-CM

## 2024-07-08 DIAGNOSIS — M67.922 BICEPS TENDINOPATHY, LEFT: ICD-10-CM

## 2024-07-08 PROCEDURE — 73020 X-RAY EXAM OF SHOULDER: CPT | Mod: 26,LT,, | Performed by: RADIOLOGY

## 2024-07-08 PROCEDURE — 73020 X-RAY EXAM OF SHOULDER: CPT | Mod: TC,LT

## 2024-07-08 PROCEDURE — 99999 PR PBB SHADOW E&M-EST. PATIENT-LVL III: CPT | Mod: PBBFAC,,, | Performed by: ORTHOPAEDIC SURGERY

## 2024-07-08 PROCEDURE — 99215 OFFICE O/P EST HI 40 MIN: CPT | Mod: S$GLB,,, | Performed by: ORTHOPAEDIC SURGERY

## 2024-07-08 PROCEDURE — 3080F DIAST BP >= 90 MM HG: CPT | Mod: CPTII,S$GLB,, | Performed by: ORTHOPAEDIC SURGERY

## 2024-07-08 PROCEDURE — 3077F SYST BP >= 140 MM HG: CPT | Mod: CPTII,S$GLB,, | Performed by: ORTHOPAEDIC SURGERY

## 2024-07-08 PROCEDURE — 3008F BODY MASS INDEX DOCD: CPT | Mod: CPTII,S$GLB,, | Performed by: ORTHOPAEDIC SURGERY

## 2024-07-08 PROCEDURE — 1159F MED LIST DOCD IN RCRD: CPT | Mod: CPTII,S$GLB,, | Performed by: ORTHOPAEDIC SURGERY

## 2024-07-08 RX ORDER — TRIPROLIDINE/PSEUDOEPHEDRINE 2.5MG-60MG
TABLET ORAL EVERY 6 HOURS PRN
COMMUNITY

## 2024-07-09 ENCOUNTER — PATIENT MESSAGE (OUTPATIENT)
Dept: ADMINISTRATIVE | Facility: HOSPITAL | Age: 64
End: 2024-07-09
Payer: COMMERCIAL

## 2024-07-10 NOTE — PROGRESS NOTES
[facility]   Physical Therapy Treatment Note      Name: Jess Gresham  Clinic Number: [id number]    Therapy Diagnosis:   Encounter Diagnoses   Name Primary?    Decreased range of motion Yes    Muscle weakness     Decreased functional mobility      Physician: Mikey Amaro,*    Visit Date: 7/11/2024    Physician Orders: PT Eval and Treat left shoulder biceps tendonitis/partial rotator cuff tear   Medical Diagnosis from Referral: Biceps tendinitis of left shoulder [M75.22], Rotator cuff syndrome, unspecified laterality   Evaluation Date: 6/27/2024  Authorization Period Expiration: 12/31/24  Plan of Care Expiration: 8/23/24  Visit # / Visits authorized: 1/ 20  FOTO:1/3: 48; goal: 66 by visit 12    Time In: 8:20  Time Out: 9:15  Total Billable Time: 45 minutes + ice    Precautions: Standard    PTA Visit #: 0/5       Subjective     Patient reports: she saw another MD for a 2nd opinion.  She will have surgery on her L shoulder in August.  She was compliant with home exercise program.  Response to previous treatment: did well  Functional change: too soon to tell    Pain: 3/10 at rest, 6/10 at worst  Location: left shoulder     Objective      Upper Extremity Strength  (R) UE  (L) UE    Lower Trap 4/5 Lower Trap 4/5   Middle Trap 4/5 Middle Trap 4-/5   Rhomboids 5/5 Rhomboids 5/5        Treatment     Jess received the treatments listed below:      therapeutic exercises to develop strength, ROM, and flexibility for 25 minutes including:  Posterior sh rolls x10  Arm hang  Pulleys for flex and abd x2 min ea  Sh IR B tubing 2x10  Sh ER B tubing 2x10    Reviewed for HEP:  Supine AA sh flex 1# dowel 10x10 sec --pt required cues to stay in pain free ROM  Table slides for sh flex 10x10 sec  Bicep stretch in doorway 3x20 sec  Scap retraction x10, 5 sec hold    neuromuscular re-education activities to improve: Kinesthetic, Sense, Proprioception, and Posture for 20 minutes. The following  activities were included:  Prone horiz abd 2x10  Prone Row 2# 2x15  Shoulder ext 2x15 Y tubing  Rows 2x15 B tubing    cold pack for 10 minutes to L shoulder.    Patient Education and Home Exercises       Education provided:   - perform ex in pain free range  Pt gave verbal understanding to all education provided     Written Home Exercises Provided: yes. Exercises were reviewed and Jess was able to demonstrate them prior to the end of the session.  Jess demonstrated good  understanding of the education provided. See Electronic Medical Record under Patient Instructions for exercises provided during therapy sessions    Assessment     Pt presents with some weakness to mid and lower traps.  She was able to perform periscap exercises without exacerbation of pain.  She demonstrated good shoulder ROM on pulleys.  She will continue to benefit from PT to improve strength prior to surgery in August.    Jess Is progressing well towards her goals.   Patient prognosis is Good.     Patient will continue to benefit from skilled outpatient physical therapy to address the deficits listed in the problem list box on initial evaluation, provide pt/family education and to maximize pt's level of independence in the home and community environment.     Patient's spiritual, cultural and educational needs considered and pt agreeable to plan of care and goals.     Anticipated barriers to physical therapy: none    Goals:   Short Term Goals:  4 weeks (progressing, not met)  1.Report decreased    L shoulder    pain  <   / =  3  /10  to increase tolerance for ADLs  2. Increased L shoulder flexion AROM to at least 170 deg for increased ease with ADLs.  3. Increased L shoulder abduction AROM to at least 175 deg without pain for increased ease with ADLs..  4.  Increased L UE strength by 1/3 muscle grade in all deficient planes to increase tolerance for ADL and work activities.  5. Pt to tolerate HEP to improve ROM and independence with  ADL's  6. L shoulder ER at 0 deg abd AROM to at least 75 deg for increased ease with ADLs.    Long Term Goals: 8 weeks (progressing, not met)  1.Report decreased    L shoulder    pain  <   / =  1  /10  to increase tolerance for ADLs  2. Increased L shoulder flexion AROM to at least 180 deg for increased ease with ADLs.  3. L shoulder ER at 0 deg abduction AROM to at least 80 deg for increased ease with ADLs.  4.  Increased L UE strength by 1 muscle grade in all deficient planes to increase tolerance for ADL and work activities.  5. Pt to tolerate HEP to improve ROM and independence with ADL's  6. L shoulder IR AROM to at least T9 without pain for increased ease with donning bra.      Plan     Continue PT towards established goals.  Once patient is able to demonstrate good form for all exercises and is able to self mange with HEP she can be discharged until after surgery.    Plan of care Certification: 6/27/2024 to 8/23/24.    Outpatient Physical Therapy 1-2 times weekly for 8 weeks to include the following interventions: Electrical Stimulation  , Manual Therapy, Moist Heat/ Ice, Neuromuscular Re-ed, Patient Education, Self Care, Therapeutic Activities, Therapeutic Exercise, Ultrasound, and dry needling.     Patsy Mejia, PT

## 2024-07-11 ENCOUNTER — CLINICAL SUPPORT (OUTPATIENT)
Dept: REHABILITATION | Facility: HOSPITAL | Age: 64
End: 2024-07-11
Payer: COMMERCIAL

## 2024-07-11 DIAGNOSIS — M25.60 DECREASED RANGE OF MOTION: Primary | ICD-10-CM

## 2024-07-11 DIAGNOSIS — R26.89 DECREASED FUNCTIONAL MOBILITY: ICD-10-CM

## 2024-07-11 DIAGNOSIS — M62.81 MUSCLE WEAKNESS: ICD-10-CM

## 2024-07-11 PROCEDURE — 97112 NEUROMUSCULAR REEDUCATION: CPT | Mod: PN

## 2024-07-11 PROCEDURE — 97110 THERAPEUTIC EXERCISES: CPT | Mod: PN

## 2024-07-15 ENCOUNTER — TELEPHONE (OUTPATIENT)
Dept: FAMILY MEDICINE | Facility: CLINIC | Age: 64
End: 2024-07-15
Payer: COMMERCIAL

## 2024-07-15 DIAGNOSIS — Z01.818 PRE-OP EVALUATION: ICD-10-CM

## 2024-07-15 DIAGNOSIS — Z00.00 LABORATORY EXAMINATION ORDERED AS PART OF A COMPLETE PHYSICAL EXAMINATION: Primary | ICD-10-CM

## 2024-07-15 NOTE — TELEPHONE ENCOUNTER
She needs a physical and a pre-op clearance.     Please have her get labs prior to her appt     Surgery scheduled for 8/8/24    Thanks

## 2024-07-16 DIAGNOSIS — M75.102 ROTATOR CUFF TEAR, NON-TRAUMATIC, LEFT: Primary | ICD-10-CM

## 2024-07-16 DIAGNOSIS — M67.922 BICEPS TENDINOPATHY, LEFT: ICD-10-CM

## 2024-07-16 NOTE — TELEPHONE ENCOUNTER
Left message to call clinic to sched fasting labs . Pt has sent portal message that she will do labs prior to her appt at the [facility] location.--lp

## 2024-07-22 ENCOUNTER — PATIENT MESSAGE (OUTPATIENT)
Dept: SPORTS MEDICINE | Facility: CLINIC | Age: 64
End: 2024-07-22
Payer: COMMERCIAL

## 2024-07-25 ENCOUNTER — PATIENT MESSAGE (OUTPATIENT)
Dept: FAMILY MEDICINE | Facility: CLINIC | Age: 64
End: 2024-07-25
Payer: COMMERCIAL

## 2024-07-29 ENCOUNTER — OFFICE VISIT (OUTPATIENT)
Dept: FAMILY MEDICINE | Facility: CLINIC | Age: 64
End: 2024-07-29
Payer: COMMERCIAL

## 2024-07-29 VITALS
BODY MASS INDEX: 23.87 KG/M2 | OXYGEN SATURATION: 99 % | HEART RATE: 75 BPM | HEIGHT: 60 IN | RESPIRATION RATE: 20 BRPM | WEIGHT: 121.56 LBS | TEMPERATURE: 98 F | DIASTOLIC BLOOD PRESSURE: 90 MMHG | SYSTOLIC BLOOD PRESSURE: 130 MMHG

## 2024-07-29 DIAGNOSIS — I77.810 MILD DILATION OF ASCENDING AORTA: ICD-10-CM

## 2024-07-29 DIAGNOSIS — Z23 NEED FOR PROPHYLACTIC VACCINATION AGAINST STREPTOCOCCUS PNEUMONIAE (PNEUMOCOCCUS): ICD-10-CM

## 2024-07-29 DIAGNOSIS — Z12.4 SCREENING FOR CERVICAL CANCER: ICD-10-CM

## 2024-07-29 DIAGNOSIS — M75.112 NONTRAUMATIC INCOMPLETE TEAR OF LEFT ROTATOR CUFF: ICD-10-CM

## 2024-07-29 DIAGNOSIS — Z01.818 PRE-OP EVALUATION: Primary | ICD-10-CM

## 2024-07-29 DIAGNOSIS — E78.5 HYPERLIPIDEMIA, UNSPECIFIED HYPERLIPIDEMIA TYPE: ICD-10-CM

## 2024-07-29 DIAGNOSIS — R03.0 ELEVATED BLOOD PRESSURE READING: ICD-10-CM

## 2024-07-29 DIAGNOSIS — Z12.31 ENCOUNTER FOR SCREENING MAMMOGRAM FOR MALIGNANT NEOPLASM OF BREAST: ICD-10-CM

## 2024-07-29 DIAGNOSIS — F41.9 ANXIETY: ICD-10-CM

## 2024-07-29 DIAGNOSIS — Z12.11 SCREEN FOR COLON CANCER: ICD-10-CM

## 2024-07-29 PROBLEM — I20.89 STABLE ANGINA: Status: RESOLVED | Noted: 2023-01-26 | Resolved: 2024-07-29

## 2024-07-29 PROCEDURE — 3044F HG A1C LEVEL LT 7.0%: CPT | Mod: CPTII,S$GLB,, | Performed by: INTERNAL MEDICINE

## 2024-07-29 PROCEDURE — 90677 PCV20 VACCINE IM: CPT | Mod: S$GLB,,, | Performed by: INTERNAL MEDICINE

## 2024-07-29 PROCEDURE — 3080F DIAST BP >= 90 MM HG: CPT | Mod: CPTII,S$GLB,, | Performed by: INTERNAL MEDICINE

## 2024-07-29 PROCEDURE — 1160F RVW MEDS BY RX/DR IN RCRD: CPT | Mod: CPTII,S$GLB,, | Performed by: INTERNAL MEDICINE

## 2024-07-29 PROCEDURE — 90471 IMMUNIZATION ADMIN: CPT | Mod: S$GLB,,, | Performed by: INTERNAL MEDICINE

## 2024-07-29 PROCEDURE — 99396 PREV VISIT EST AGE 40-64: CPT | Mod: 25,S$GLB,, | Performed by: INTERNAL MEDICINE

## 2024-07-29 PROCEDURE — 93005 ELECTROCARDIOGRAM TRACING: CPT | Mod: S$GLB,,, | Performed by: INTERNAL MEDICINE

## 2024-07-29 PROCEDURE — 93010 ELECTROCARDIOGRAM REPORT: CPT | Mod: S$GLB,,, | Performed by: INTERNAL MEDICINE

## 2024-07-29 PROCEDURE — 1159F MED LIST DOCD IN RCRD: CPT | Mod: CPTII,S$GLB,, | Performed by: INTERNAL MEDICINE

## 2024-07-29 PROCEDURE — 3075F SYST BP GE 130 - 139MM HG: CPT | Mod: CPTII,S$GLB,, | Performed by: INTERNAL MEDICINE

## 2024-07-29 PROCEDURE — 3008F BODY MASS INDEX DOCD: CPT | Mod: CPTII,S$GLB,, | Performed by: INTERNAL MEDICINE

## 2024-07-29 NOTE — PROGRESS NOTES
Subjective:       Patient ID: Jess Gresham is a 64 y.o. female.    Medication List with Changes/Refills   Current Medications    ATORVASTATIN (LIPITOR) 40 MG TABLET    Take 1 tablet (40 mg total) by mouth once daily.    IBUPROFEN 20 MG/ML ORAL LIQUID    Take by mouth every 6 (six) hours as needed for Temperature greater than.    MUPIROCIN (BACTROBAN) 2 % OINTMENT    Apply pea sized amount to inside of nostrils twice daily for 10 days       Chief Complaint: Pre-op Exam  She is here today for her annual exam and for a pre-op evaluation.     She had a left rotator cuff tear and is scheduled for repair on 8/8/2024.  She has difficulty with ROM of her left arm and pain.  She is using ibuprofen 400 mg bid to help with her pain that is worse with movement and with sleep.     She has a intermittent episodes of chest pressure with associated lightheadedness. She occur more with stress and while at work. They do not occur with exercise. In he past it was more severe and she did have an episode of LOC.  She was seen by cardiology and a full workup with an echo on 1/2023 showing EF 55% and mild plaque with dilated ascending aorta of 3.8 cm.  Nuclear stress on 1/2024 was negative. Carotid u/s on 3/2023 was negative. CTA on 2/2023 showed ascending aorta that was 3.8 cm.  Cardiology did not feel her symptoms were due to CAD and advised to recheck echo in 1-2 years to evaluate her aortic ectasia. She denies chest pain or shortness of breath. No lower leg swelling.     She has noted episodes of elevated blood pressure at her doctor's visits. She checks at home and feels the readings look good. She did have an episode of facial flushing at home with elevated pressures but no further events. Cardiology recommended starting an ARB if she ever developed hypertension.     She has hyperlipidemia and is taking atorvastatin 20 mg daily. Lipids on 7/2024 were 211/96/76/115 (down from 188).      She has dyspareunia due to vaginal pain. She  tried multiple treatments in the past of topical estrogens and vagifem without success.  She has had for many years since menopause. In the past she had recurrent UTIs but has not had any recently.  No vaginal d/c or bleeding. Her last pap was on 11/2018 negative with HPV negative.      She has mild chronic anxiety associated with work. She feels her symptoms are mild and stable.  She denies any depression symptoms. No panic attacks. She is sleeping well.      She lives with her  and feels safe. She is very active and exercises regularly with swimming (competitively) and running. She eats health. 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      Colonoscopy---2014 repeat in 10 years (she had done in [place] and is not sure provider)---fecal kit 8/2021 neg   Mammogram----8/2021 neg  Pap-----11/2018 neg   Tdap---8/2021  Influenza vaccine---none  Prevnar 20----none   Shingrex vaccine-----8/2021, 12/2021   Covid vaccine--- 2 doses   RSV vaccine ----none     Review of Systems   Constitutional:  Negative for appetite change, fatigue, fever and unexpected weight change.   HENT:  Positive for congestion. Negative for ear pain, hearing loss, sore throat and trouble swallowing.    Eyes:  Negative for pain and visual disturbance.   Respiratory:  Negative for cough, chest tightness, shortness of breath and wheezing.    Cardiovascular:  Negative for chest pain, palpitations and leg swelling.   Gastrointestinal:  Negative for abdominal pain, blood in stool, constipation, diarrhea, nausea and vomiting.   Endocrine: Negative for polyuria.   Genitourinary:  Negative for dysuria and hematuria.   Musculoskeletal:  Positive for arthralgias. Negative for back pain and myalgias.   Skin:  Negative for rash.   Neurological:  Positive for headaches. Negative for dizziness, weakness and numbness.   Hematological:  Does not bruise/bleed easily.   Psychiatric/Behavioral:  Negative for dysphoric mood, sleep disturbance  and suicidal ideas. The patient is nervous/anxious.        Objective:      Vitals:    07/29/24 0920   BP: (!) 130/90   BP Location: Left arm   Patient Position: Sitting   BP Method: Medium (Manual)   Pulse: 75   Resp: 20   Temp: 98.4 °F (36.9 °C)   SpO2: 99%   Weight: 55.2 kg (121 lb 9.3 oz)   Height: 5' (1.524 m)     Body mass index is 23.75 kg/m².  Physical Exam    General appearance: No acute distress, cooperative  Eyes: PERRL, EOMI, conjunctiva clear  Ears: normal external ear and pinna, tm clear without drainage, canals clear  Nose: Normal mucosa without drainage  Throat: no exudates or erythema, tonsils not enlarged  Mouth: no sores or lesions, moist mucous membranes  Neck: FROM, soft, supple, no thyromegaly, no bruits  Lymph: no anterior or posterior cervical adenopathy  Heart::  Regular rate and rhythm, no murmur  Lung: Clear to ascultation bilaterally, no wheezing, no rales, no rhonchi, no distress  Abdomen: Soft, nontender, no distention, no hepatosplenomegaly, bowel sounds normal, no guarding, no rebound, no peritoneal signs  Skin: no rashes, no lesions  Extremities: no edema, no cyanosis  Neuro: CN 2-12 intact, 5/5 muscle strength upper and lower extremity bilaterally, 2+ DTRs UE and LE bilaterally, normal gait  Peripheral pulses: 2+ pedal pulses bilaterally, good perfusion and color  Musculoskeletal: FROM, good strenth, no tenderness  Joint: normal appearance, no swelling, no warmth, no deformity in all joints    Assessment:       1. Pre-op evaluation    2. Nontraumatic incomplete tear of left rotator cuff    3. Mild dilation of ascending aorta    4. Elevated blood pressure reading    5. Hyperlipidemia, unspecified hyperlipidemia type    6. Anxiety    7. Encounter for screening mammogram for malignant neoplasm of breast    8. Screen for colon cancer    9. Screening for cervical cancer    10. Need for prophylactic vaccination against Streptococcus pneumoniae (pneumococcus)        Plan:       Pre-op  evaluation for Nontraumatic incomplete tear of left rotator cuff  REviwed labs and EKG. She is medically cleared for surgery and is a low cardiac risk.   -     IN OFFICE EKG 12-LEAD (to Muse)    Mild dilation of ascending aorta  Due to recheck echo for stability.   -     Echo; Future    Elevated blood pressure reading  Noted on multiple readings. She will check at home and send me readings in one week. Consider starting ARB.     Hyperlipidemia, unspecified hyperlipidemia type  Good control on atorvastatin    Anxiety  Mild and stable    Encounter for screening mammogram for malignant neoplasm of breast  -     Mammo Digital Screening Bilat w/ Sanjay; Future; Expected date: 07/29/2024    Screen for colon cancer  -     Case Request Endoscopy: COLONOSCOPY    Screening for cervical cancer  -     Ambulatory referral/consult to Obstetrics / Gynecology; Future; Expected date: 08/05/2024    Need for prophylactic vaccination against Streptococcus pneumoniae (pneumococcus)  -     pneumoc 20-abraham conj-dip cr(PF) (PREVNAR-20 (PF)) injection Syrg 0.5 mL    Follow up in about 1 year (around 7/29/2025) for annual exam.

## 2024-07-30 LAB
OHS QRS DURATION: 90 MS
OHS QTC CALCULATION: 442 MS

## 2024-08-01 ENCOUNTER — PATIENT MESSAGE (OUTPATIENT)
Dept: PREADMISSION TESTING | Facility: HOSPITAL | Age: 64
End: 2024-08-01
Payer: COMMERCIAL

## 2024-08-01 NOTE — ANESTHESIA PAT ROS NOTE
08/01/2024  Jess Gresham is a 64 y.o., female.      Pre-op Assessment    I have reviewed the Patient Summary Reports.       I have reviewed the Medications.     Review of Systems  Anesthesia Hx:    H/O PONV History of prior surgery of interest to airway management or planning:  Previous anesthesia: General 12/11/2020  Left Knee Arthroscopy, Meniscectomy, Chondroplasty with general anesthesia.  Procedure performed at an [facility].      Airway issues documented on chart review include mask, easy, laryngeal mask airway used      Personal Hx of Anesthesia complications, Post-Operative Nausea/Vomiting, with every anesthetic, treatment not known                    Social:  Non-Smoker, Social Alcohol Use       Hematology/Oncology:  Hematology Normal   Oncology Normal                                   EENT/Dental:  EENT/Dental Normal           Cardiovascular:  Exercise tolerance: good   Hypertension, well controlled   Denies MI.  Denies CAD.     Denies Dysrhythmias.  Angina, with exertion     hyperlipidemia  Denies OTERO.  ECG has been reviewed. Mild dilation of ascending aorta, Acquired dilation of ascending aorta and aortic root,  Elevated blood pressure reading, home B/P's in normal range,   Familial hypercholesterolemia,  Aortic atherosclerosis,   intermittent episodes of chest pressure with associated lightheadedness; occur more with stress and while at work. Cardiac work-up per Cardiology,  May have mild microvascular angina                             Pulmonary:  Pulmonary Normal   Denies COPD.  Denies Asthma.   Denies Shortness of breath.                  Renal/:  Renal/ Normal                 Hepatic/GI:  Hepatic/GI Normal     Denies GERD. Denies Liver Disease.            Musculoskeletal:     Rotator cuff tear, non-traumatic, left,  Biceps tendinopathy, left,  H/O Acute medial meniscal tear,  left,  Bilateral knee surgeries,  Left Knee Arthroscopy with Meniscectomy, Chondroplasty              Neurological:    Denies CVA. Neuromuscular Disease,  Headaches Denies Seizures.    Trigger finger, right middle finger                            Endocrine:  Endocrine Normal Denies Diabetes. Denies Hypothyroidism.          Psych:   anxiety                Past Medical History:   Diagnosis Date    Abnormal Pap smear of cervix     in early 1990s---cone biopsy which was negative---all pap normal since      Past Surgical History:   Procedure Laterality Date    CHONDROPLASTY Left 12/11/2020    Procedure: CHONDROPLASTY;  Surgeon: Mikey Amaro MD;  Location: [facility] OR;  Service: Orthopedics;  Laterality: Left;    COLONOSCOPY  2014    KNEE ARTHROSCOPY W/ MENISCECTOMY Left 12/11/2020    Procedure: ARTHROSCOPY, KNEE, WITH MENISCECTOMY and a baker cyst aspiration;  Surgeon: Mikey Amaro MD;  Location: [facility] OR;  Service: Orthopedics;  Laterality: Left;  medial     KNEE SURGERY Bilateral        Anesthesia Assessment: Preoperative EQUATION    Planned Procedure: Procedure(s) (LRB):  REPAIR, ROTATOR CUFF, ARTHROSCOPIC (Left)  ARTHROSCOPY, SHOULDER, WITH SUBACROMIAL SPACE DECOMPRESSION (Left)  DEBRIDEMENT, SHOULDER, ARTHROSCOPIC (Left)  TENODESIS, BICEPS, OPEN (Left)  Requested Anesthesia Type:General/Regional  Surgeon: Wilbur Pa MD  Service: Orthopedics  Known or anticipated Date of Surgery:8/8/2024    Surgeon notes: reviewed    Electronic QUestionnaire Assessment completed via nurse interview with patient.        Triage considerations:     The patient has no apparent active cardiac condition (No unstable coronary Syndrome such as severe unstable angina or recent [<1 month] myocardial infarction, decompensated CHF, severe valvular   disease or significant arrhythmia)    Previous anesthesia records:LMA General, Easy airway, and No problems, H/O PONV    Last PCP note: within 1 month , within [facility]    Subspecialty notes: Cardiology: General    Other important co-morbidities: HLD and HTN       EKG 7/29/2024:  Vent. Rate : 068 BPM     Atrial Rate : 068 BPM      P-R Int : 172 ms          QRS Dur : 090 ms       QT Int : 416 ms       P-R-T Axes : 057 007 060 degrees      QTc Int : 442 ms   Normal sinus rhythm   Normal ECG   When compared with ECG of 31-JAN-2023 09:42,   Questionable change in The axis   Confirmed by Jb Serrano MD ([id number]) on 7/30/2024 8:08:17 AM       Exercise Stress Test with Myocardial Perfusion/ Spect 1/31/2023:  Interpretation Summary     Normal myocardial perfusion scan. There is no evidence of myocardial ischemia or infarction.    The gated perfusion images showed an ejection fraction of 54% post stress.    There is normal wall motion post stress.    LV cavity size is normal at stress.    The ECG portion of the study is negative for ischemia.    The patient reported chest pain at peak stress that resolved 1 minute into recovery.    There were no arrhythmias during stress.    The exercise capacity was excellent.   This is a low risk study.       Echo 1/12/2023:  Summary  The left ventricle is normal in size with normal systolic function.  The estimated ejection fraction is 55+/-5%.  Normal right ventricular size with normal right ventricular systolic function.  Normal central venous pressure (3 mmHg).  There is no pulmonary hypertension.  The ascending aorta is mildly dilated measuring 3.8cm  The estimated PA systolic pressure is 21 mmHg.  Plaque present in the descending aorta.      Bilateral Carotid Doppler/ Ultrasound 3/1/2023:  Interpretation Summary  Mild intimal thickening but no significant plaque or stenosis in the right carotid artery  Mild intimal thickening but no significant plaque or stenosis in the left carotid artery  Antegrade flow and normal velocity in both vertebral arteries.      CTA Chest, Abd, Pelvis 2/2/2023:   Impression:   The ascending thoracic aorta measures 3.8 cm.   Otherwise, no evidence of aortic dissection or aneurysm.   Left upper lobe 3 mm pulmonary nodules.  For multiple solid nodules all <6 mm, Fleischner Society 2017 guidelines recommend no routine follow up for a low risk patient, or follow up with non-contrast chest CT at 12 months after discovery in a high risk patient.      Tests already available:  Results have been reviewed.             Instructions given. (See in Nurse's note) Preop medication instructions sent via portal message.     Optimization:  Anesthesia Preop Clinic Assessment Not Indicated    Medical Opinion Indicated: Yes, PCP       Sub-specialist consult indicated: No      Plan: Consultation:Patient's PCP for a statement of optimization             Patient  has previously scheduled Medical Appointment: 7/29/2024    Navigation: Patient is medically cleared for surgery per PCP and is a low cardiac risk.      Ht: 5'  Wt: 55.2 kg (121 lb)  BMI: 23.75  Vaccinated

## 2024-08-02 ENCOUNTER — OFFICE VISIT (OUTPATIENT)
Dept: SPORTS MEDICINE | Facility: CLINIC | Age: 64
End: 2024-08-02
Payer: COMMERCIAL

## 2024-08-02 VITALS
HEART RATE: 80 BPM | BODY MASS INDEX: 23.44 KG/M2 | HEIGHT: 60 IN | DIASTOLIC BLOOD PRESSURE: 96 MMHG | SYSTOLIC BLOOD PRESSURE: 149 MMHG | WEIGHT: 119.38 LBS

## 2024-08-02 DIAGNOSIS — M75.102 ROTATOR CUFF TEAR, NON-TRAUMATIC, LEFT: Primary | ICD-10-CM

## 2024-08-02 DIAGNOSIS — M67.922 BICEPS TENDINOPATHY, LEFT: ICD-10-CM

## 2024-08-02 PROCEDURE — 99999 PR PBB SHADOW E&M-EST. PATIENT-LVL III: CPT | Mod: PBBFAC,,, | Performed by: PHYSICIAN ASSISTANT

## 2024-08-02 RX ORDER — CEFAZOLIN SODIUM 2 G/50ML
2 SOLUTION INTRAVENOUS
OUTPATIENT
Start: 2024-08-02

## 2024-08-02 RX ORDER — MUPIROCIN 20 MG/G
OINTMENT TOPICAL
OUTPATIENT
Start: 2024-08-02

## 2024-08-02 RX ORDER — HYDROCODONE BITARTRATE AND ACETAMINOPHEN 7.5; 325 MG/1; MG/1
1 TABLET ORAL EVERY 6 HOURS PRN
Qty: 20 TABLET | Refills: 0 | Status: SHIPPED | OUTPATIENT
Start: 2024-08-02

## 2024-08-02 NOTE — H&P (VIEW-ONLY)
H&P    History 8/2/2024:  Jess returns here today for follow-up evaluation of her left shoulder and to complete her preoperative paperwork.  She continues to have pain and functional limitations despite conservative treatment.  Surgical intervention has been recommended and she is scheduled to undergo a left shoulder arthroscopy with rotator cuff repair, extensive debridement, sub-acromial decompression, and possible open sub-pectoral biceps tenodesis versus arthroscopic biceps tenodesis on August 8, 2024.    History 7/8/2024:  64 y.o. 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  She was referred to me from a former patient for 2nd opinion.  She states she started to notice shoulder pain 4 months ago. She does not recall a specific event that caused pain, but has noticed progressive pain while swimming. She states she noticed significant pain when taking off a sports bra. She has a previous CSI 8 weeks ago in [facility] with zero relief.  She wants to continue being very active and competitive with swimming.    + mechanical symptoms, - instability    Is affecting ADLs.  Pain is 6/10 at it's worst.        PAST MEDICAL HISTORY    Past Medical History:   Diagnosis Date    Abnormal Pap smear of cervix     in early 1990s---cone biopsy which was negative---all pap normal since        PAST SURGICAL HISTORY     Past Surgical History:   Procedure Laterality Date    CHONDROPLASTY Left 12/11/2020    Procedure: CHONDROPLASTY;  Surgeon: Mikey Amaro MD;  Location: [facility] OR;  Service: Orthopedics;  Laterality: Left;    COLONOSCOPY  2014    KNEE ARTHROSCOPY W/ MENISCECTOMY Left 12/11/2020    Procedure: ARTHROSCOPY, KNEE, WITH MENISCECTOMY and a baker cyst aspiration;  Surgeon: Mikey Amaro MD;  Location: [facility] OR;  Service: Orthopedics;  Laterality: Left;  medial     KNEE SURGERY Bilateral        FAMILY HISTORY    Family History   Problem Relation Name Age of Onset    Heart disease Father       Skin cancer Father      Aortic aneurysm Father      Dementia Mother      No Known Problems Sister         SOCIAL HISTORY    Social History     Socioeconomic History    Marital status:    Tobacco Use    Smoking status: Never    Smokeless tobacco: Never   Substance and Sexual Activity    Alcohol use: Yes     Comment: once a week    Drug use: No    Sexual activity: Yes     Partners: Male   Social History Narrative    Moves a lot    From [place]     Social Determinants of Health     Financial Resource Strain: Patient Declined (6/20/2024)    Overall Financial Resource Strain (CARDIA)     Difficulty of Paying Living Expenses: Patient declined   Food Insecurity: Patient Declined (6/20/2024)    Hunger Vital Sign     Worried About Running Out of Food in the Last Year: Patient declined     Ran Out of Food in the Last Year: Patient declined   Transportation Needs: No Transportation Needs (3/14/2024)    PRAPARE - Transportation     Lack of Transportation (Medical): No     Lack of Transportation (Non-Medical): No   Physical Activity: Sufficiently Active (6/20/2024)    Exercise Vital Sign     Days of Exercise per Week: 5 days     Minutes of Exercise per Session: 40 min   Stress: Stress Concern Present (6/20/2024)    [employer] [place] [employer] - Occupational Stress Questionnaire     Feeling of Stress : To some extent   Housing Stability: Patient Declined (3/14/2024)    Housing Stability Vital Sign     Unable to Pay for Housing in the Last Year: Patient declined     Unstable Housing in the Last Year: Patient declined       MEDICATIONS      Current Outpatient Medications:     atorvastatin (LIPITOR) 40 MG tablet, Take 1 tablet (40 mg total) by mouth once daily., Disp: 90 tablet, Rfl: 3    ibuprofen 20 mg/mL oral liquid, Take by mouth every 6 (six) hours as needed for Temperature greater than. (Patient not taking: Reported on 8/2/2024), Disp: , Rfl:     mupirocin (BACTROBAN) 2 % ointment, Apply pea sized  amount to inside of nostrils twice daily for 10 days, Disp: 30 g, Rfl: 1    ALLERGIES     Review of patient's allergies indicates:   Allergen Reactions    Ciprofloxacin Anaphylaxis    Darvocet a500 [propoxyphene n-acetaminophen] Hives    Oxycodone Hives         REVIEW OF SYSTEMS   Constitution: Negative. Negative for chills, fever and night sweats.   HENT: Negative for congestion and headaches.    Eyes: Negative for blurred vision, left vision loss and right vision loss.   Cardiovascular: Negative for chest pain and syncope.   Respiratory: Negative for cough and shortness of breath.    Endocrine: Negative for polydipsia, polyphagia and polyuria.   Hematologic/Lymphatic: Negative for bleeding problem. Does not bruise/bleed easily.   Skin: Negative for dry skin, itching and rash.   Musculoskeletal: Negative for falls. Positive for left shoulder pain   Gastrointestinal: Negative for abdominal pain and bowel incontinence.   Genitourinary: Negative for bladder incontinence and nocturia.   Neurological: Negative for disturbances in coordination, loss of balance and seizures.   Psychiatric/Behavioral: Negative for depression. The patient does not have insomnia.    Allergic/Immunologic: Negative for hives and persistent infections.     PHYSICAL EXAMINATION    Vitals: BP (!) 149/96   Pulse 80   Ht 5' (1.524 m)   Wt 54.2 kg (119 lb 6.1 oz)   LMP 02/01/2013   BMI 23.31 kg/m²     General: The patient appears active and healthy with no apparent physical problems.  No disturbance of mood or affect is demonstrated. Alert and Oriented.    HEENT: Eyes normal, pupils equally round, nose normal.    Resp: Equal and symmetrical chest rises. No wheezing    CV: Regular rate    Neck: Supple; nonpainful range of motion.    Extremities: no cyanosis, clubbing, edema, or diffuse swelling.  Palpable pulses, good capillary refill of the digits.  No coolness, discoloration, edema or obvious varicosities.    Skin: no lesions  noted.    Lymphatic: no detected adenopathy in the upper or lower extremities.    Neurologic: normal mental status, normal reflexes, normal gait and balance.  Patient is alert and oriented to person, place and time.  No flaccidity or spasticity is noted.  No motor or sensory deficits are noted.  Light touch is intact    Orthopaedic: SHOULDER EXAM - LEFT    Inspection:   Normal skin color and appearance with no scars.  No muscle atrophy noted.  No scapular winging.      Palpation: No tenderness of the acromioclavicular joint, lateral edge of the acromion, , trapezius muscle or scapulothoracic bursa.  + tenderness biceps tendon    ROM:      PROM:     FE - 180°    Abd/ER -  90° with pain  Abd/IR -  45°   Add/ER -  60° With pain     AROM:    FE - 180°    Abd/ER -  90°  Abd/IR -  45°   Add/ER -  60°         Tests:     + Uriostegui, + Neer's, - Cross Arm Adduction, - [place], - [place],  + Speed. - Belly Press,  +Jobes, - Lift Off    Stability: - sulcus, - apprehension, - relocation, - load and shift, + DLS      Motor:  Rotator cuff strength is 4/5 supraspinatus, 5/5 infraspinatus, 5/5 subscapularis. Biceps, triceps and deltoid strength is 5/5.      Neuro     Distally there are no paresthesias, and sensation is intact to light touch in the median, ulnar, and radial distributions.  Reflexes are 2/2 biceps, triceps and brachioradialis.        IMAGING    I reviewed an outside MRI which demonstrates a high-grade tear involving the anterior surface of the supraspinatus with significant biceps tenosynovitis partial-thickness upper border subscapularis tendon tearing.  No significant glenohumeral joint chondral changes.  Small degenerative SLAP tear noted.    IMPRESSION       ICD-10-CM ICD-9-CM   1. Rotator cuff tear, non-traumatic, left  M75.102 727.61   2. Biceps tendinopathy, left  M67.922 727.9         MEDICATIONS PRESCRIBED      Hydrocodone 7.5/325 mg      RECOMMENDATIONS     Surgical versus non-surgical options discussed  today; left shoulder arthroscopy with rotator cuff repair, extensive debridement, sub-acromial decompression, and possible open sub-pectoral biceps tenodesis versus arthroscopic biceps tenodesis  The patient elected to proceed with operative intervention after all risks, benefits, and alternatives were discussed with the patient.  The risks of surgery include bleeding, scar formation, injuries to nerves, arteries and veins, need for additional surgeries, blood clots, infections, inability to return to the same level of the performance and the risk of anesthesia.   Informed consent was obtained  Physical therapy was ordered  Fitted for sling today      All questions were answered, pt will contact us for questions or concerns in the interim.        Soren Bravo PA-C, MMS

## 2024-08-02 NOTE — PROGRESS NOTES
PREOPERATIVE APPOINTMENT    History 8/2/2024:  Jess returns here today for follow-up evaluation of her left shoulder and to complete her preoperative paperwork.  She continues to have pain and functional limitations despite conservative treatment.  Surgical intervention has been recommended and she is scheduled to undergo a left shoulder arthroscopy with rotator cuff repair, extensive debridement, sub-acromial decompression, and possible open sub-pectoral biceps tenodesis versus arthroscopic biceps tenodesis on August 8, 2024.    History 7/8/2024:  64 y.o. 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  She was referred to me from a former patient for 2nd opinion.  She states she started to notice shoulder pain 4 months ago. She does not recall a specific event that caused pain, but has noticed progressive pain while swimming. She states she noticed significant pain when taking off a sports bra. She has a previous CSI 8 weeks ago in [facility] with zero relief.  She wants to continue being very active and competitive with swimming.    + mechanical symptoms, - instability    Is affecting ADLs.  Pain is 6/10 at it's worst.        PAST MEDICAL HISTORY    Past Medical History:   Diagnosis Date    Abnormal Pap smear of cervix     in early 1990s---cone biopsy which was negative---all pap normal since        PAST SURGICAL HISTORY     Past Surgical History:   Procedure Laterality Date    CHONDROPLASTY Left 12/11/2020    Procedure: CHONDROPLASTY;  Surgeon: Mikey Amaro MD;  Location: [facility] OR;  Service: Orthopedics;  Laterality: Left;    COLONOSCOPY  2014    KNEE ARTHROSCOPY W/ MENISCECTOMY Left 12/11/2020    Procedure: ARTHROSCOPY, KNEE, WITH MENISCECTOMY and a baker cyst aspiration;  Surgeon: Mikey Amaro MD;  Location: [facility] OR;  Service: Orthopedics;  Laterality: Left;  medial     KNEE SURGERY Bilateral        FAMILY HISTORY    Family History   Problem Relation Name Age of Onset     Heart disease Father      Skin cancer Father      Aortic aneurysm Father      Dementia Mother      No Known Problems Sister         SOCIAL HISTORY    Social History     Socioeconomic History    Marital status:    Tobacco Use    Smoking status: Never    Smokeless tobacco: Never   Substance and Sexual Activity    Alcohol use: Yes     Comment: once a week    Drug use: No    Sexual activity: Yes     Partners: Male   Social History Narrative    Moves a lot    From [place]     Social Determinants of Health     Financial Resource Strain: Patient Declined (6/20/2024)    Overall Financial Resource Strain (CARDIA)     Difficulty of Paying Living Expenses: Patient declined   Food Insecurity: Patient Declined (6/20/2024)    Hunger Vital Sign     Worried About Running Out of Food in the Last Year: Patient declined     Ran Out of Food in the Last Year: Patient declined   Transportation Needs: No Transportation Needs (3/14/2024)    PRAPARE - Transportation     Lack of Transportation (Medical): No     Lack of Transportation (Non-Medical): No   Physical Activity: Sufficiently Active (6/20/2024)    Exercise Vital Sign     Days of Exercise per Week: 5 days     Minutes of Exercise per Session: 40 min   Stress: Stress Concern Present (6/20/2024)    [employer] - Occupational Stress Questionnaire     Feeling of Stress : To some extent   Housing Stability: Patient Declined (3/14/2024)    Housing Stability Vital Sign     Unable to Pay for Housing in the Last Year: Patient declined     Unstable Housing in the Last Year: Patient declined       MEDICATIONS      Current Outpatient Medications:     atorvastatin (LIPITOR) 40 MG tablet, Take 1 tablet (40 mg total) by mouth once daily., Disp: 90 tablet, Rfl: 3    ibuprofen 20 mg/mL oral liquid, Take by mouth every 6 (six) hours as needed for Temperature greater than. (Patient not taking: Reported on 8/2/2024), Disp: , Rfl:     mupirocin (BACTROBAN) 2 %  ointment, Apply pea sized amount to inside of nostrils twice daily for 10 days, Disp: 30 g, Rfl: 1    ALLERGIES     Review of patient's allergies indicates:   Allergen Reactions    Ciprofloxacin Anaphylaxis    Darvocet a500 [propoxyphene n-acetaminophen] Hives    Oxycodone Hives         REVIEW OF SYSTEMS   Constitution: Negative. Negative for chills, fever and night sweats.   HENT: Negative for congestion and headaches.    Eyes: Negative for blurred vision, left vision loss and right vision loss.   Cardiovascular: Negative for chest pain and syncope.   Respiratory: Negative for cough and shortness of breath.    Endocrine: Negative for polydipsia, polyphagia and polyuria.   Hematologic/Lymphatic: Negative for bleeding problem. Does not bruise/bleed easily.   Skin: Negative for dry skin, itching and rash.   Musculoskeletal: Negative for falls. Positive for left shoulder pain   Gastrointestinal: Negative for abdominal pain and bowel incontinence.   Genitourinary: Negative for bladder incontinence and nocturia.   Neurological: Negative for disturbances in coordination, loss of balance and seizures.   Psychiatric/Behavioral: Negative for depression. The patient does not have insomnia.    Allergic/Immunologic: Negative for hives and persistent infections.     PHYSICAL EXAMINATION    Vitals: BP (!) 149/96   Pulse 80   Ht 5' (1.524 m)   Wt 54.2 kg (119 lb 6.1 oz)   LMP 02/01/2013   BMI 23.31 kg/m²     General: The patient appears active and healthy with no apparent physical problems.  No disturbance of mood or affect is demonstrated. Alert and Oriented.    HEENT: Eyes normal, pupils equally round, nose normal.    Resp: Equal and symmetrical chest rises. No wheezing    CV: Regular rate    Neck: Supple; nonpainful range of motion.    Extremities: no cyanosis, clubbing, edema, or diffuse swelling.  Palpable pulses, good capillary refill of the digits.  No coolness, discoloration, edema or obvious varicosities.    Skin: no  lesions noted.    Lymphatic: no detected adenopathy in the upper or lower extremities.    Neurologic: normal mental status, normal reflexes, normal gait and balance.  Patient is alert and oriented to person, place and time.  No flaccidity or spasticity is noted.  No motor or sensory deficits are noted.  Light touch is intact    Orthopaedic: SHOULDER EXAM - LEFT    Inspection:   Normal skin color and appearance with no scars.  No muscle atrophy noted.  No scapular winging.      Palpation: No tenderness of the acromioclavicular joint, lateral edge of the acromion, , trapezius muscle or scapulothoracic bursa.  + tenderness biceps tendon    ROM:      PROM:     FE - 180°    Abd/ER -  90° with pain  Abd/IR -  45°   Add/ER -  60° With pain     AROM:    FE - 180°    Abd/ER -  90°  Abd/IR -  45°   Add/ER -  60°         Tests:     + Uriostegui, + Neer's, - Cross Arm Adduction, - [place], - Yerguson,  + Speed. - Belly Press,  +Jobes, - Lift Off    Stability: - sulcus, - apprehension, - relocation, - load and shift, + DLS      Motor:  Rotator cuff strength is 4/5 supraspinatus, 5/5 infraspinatus, 5/5 subscapularis. Biceps, triceps and deltoid strength is 5/5.      Neuro     Distally there are no paresthesias, and sensation is intact to light touch in the median, ulnar, and radial distributions.  Reflexes are 2/2 biceps, triceps and brachioradialis.        IMAGING    I reviewed an outside MRI which demonstrates a high-grade tear involving the anterior surface of the supraspinatus with significant biceps tenosynovitis partial-thickness upper border subscapularis tendon tearing.  No significant glenohumeral joint chondral changes.  Small degenerative SLAP tear noted.    IMPRESSION       ICD-10-CM ICD-9-CM   1. Rotator cuff tear, non-traumatic, left  M75.102 727.61   2. Biceps tendinopathy, left  M67.922 727.9         MEDICATIONS PRESCRIBED      Hydrocodone 7.5/325 mg      RECOMMENDATIONS     Surgical versus non-surgical options  discussed today; left shoulder arthroscopy with rotator cuff repair, extensive debridement, sub-acromial decompression, and possible open sub-pectoral biceps tenodesis versus arthroscopic biceps tenodesis  The patient elected to proceed with operative intervention after all risks, benefits, and alternatives were discussed with the patient.  The risks of surgery include bleeding, scar formation, injuries to nerves, arteries and veins, need for additional surgeries, blood clots, infections, inability to return to the same level of the performance and the risk of anesthesia.   Informed consent was obtained  Physical therapy was ordered  Fitted for sling today      All questions were answered, pt will contact us for questions or concerns in the interim.        Soren Bravo PA-C, [facility]

## 2024-08-02 NOTE — H&P
H&P    History 8/2/2024:  Jess returns here today for follow-up evaluation of her left shoulder and to complete her preoperative paperwork.  She continues to have pain and functional limitations despite conservative treatment.  Surgical intervention has been recommended and she is scheduled to undergo a left shoulder arthroscopy with rotator cuff repair, extensive debridement, sub-acromial decompression, and possible open sub-pectoral biceps tenodesis versus arthroscopic biceps tenodesis on August 8, 2024.    History 7/8/2024:  64 y.o. 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  She was referred to me from a former patient for 2nd opinion.  She states she started to notice shoulder pain 4 months ago. She does not recall a specific event that caused pain, but has noticed progressive pain while swimming. She states she noticed significant pain when taking off a sports bra. She has a previous CSI 8 weeks ago in [facility] with zero relief.  She wants to continue being very active and competitive with swimming.    + mechanical symptoms, - instability    Is affecting ADLs.  Pain is 6/10 at it's worst.        PAST MEDICAL HISTORY    Past Medical History:   Diagnosis Date    Abnormal Pap smear of cervix     in early 1990s---cone biopsy which was negative---all pap normal since        PAST SURGICAL HISTORY     Past Surgical History:   Procedure Laterality Date    CHONDROPLASTY Left 12/11/2020    Procedure: CHONDROPLASTY;  Surgeon: Mikey Amaro MD;  Location: [facility] OR;  Service: Orthopedics;  Laterality: Left;    COLONOSCOPY  2014    KNEE ARTHROSCOPY W/ MENISCECTOMY Left 12/11/2020    Procedure: ARTHROSCOPY, KNEE, WITH MENISCECTOMY and a baker cyst aspiration;  Surgeon: Mikey Amaro MD;  Location: [facility] OR;  Service: Orthopedics;  Laterality: Left;  medial     KNEE SURGERY Bilateral        FAMILY HISTORY    Family History   Problem Relation Name Age of Onset    Heart disease Father       Skin cancer Father      Aortic aneurysm Father      Dementia Mother      No Known Problems Sister         SOCIAL HISTORY    Social History     Socioeconomic History    Marital status:    Tobacco Use    Smoking status: Never    Smokeless tobacco: Never   Substance and Sexual Activity    Alcohol use: Yes     Comment: once a week    Drug use: No    Sexual activity: Yes     Partners: Male   Social History Narrative    Moves a lot    From [place]     Social Determinants of Health     Financial Resource Strain: Patient Declined (6/20/2024)    Overall Financial Resource Strain (CARDIA)     Difficulty of Paying Living Expenses: Patient declined   Food Insecurity: Patient Declined (6/20/2024)    Hunger Vital Sign     Worried About Running Out of Food in the Last Year: Patient declined     Ran Out of Food in the Last Year: Patient declined   Transportation Needs: No Transportation Needs (3/14/2024)    PRAPARE - Transportation     Lack of Transportation (Medical): No     Lack of Transportation (Non-Medical): No   Physical Activity: Sufficiently Active (6/20/2024)    Exercise Vital Sign     Days of Exercise per Week: 5 days     Minutes of Exercise per Session: 40 min   Stress: Stress Concern Present (6/20/2024)    [employer] - Occupational Stress Questionnaire     Feeling of Stress : To some extent   Housing Stability: Patient Declined (3/14/2024)    Housing Stability Vital Sign     Unable to Pay for Housing in the Last Year: Patient declined     Unstable Housing in the Last Year: Patient declined       MEDICATIONS      Current Outpatient Medications:     atorvastatin (LIPITOR) 40 MG tablet, Take 1 tablet (40 mg total) by mouth once daily., Disp: 90 tablet, Rfl: 3    ibuprofen 20 mg/mL oral liquid, Take by mouth every 6 (six) hours as needed for Temperature greater than. (Patient not taking: Reported on 8/2/2024), Disp: , Rfl:     mupirocin (BACTROBAN) 2 % ointment, Apply pea sized  amount to inside of nostrils twice daily for 10 days, Disp: 30 g, Rfl: 1    ALLERGIES     Review of patient's allergies indicates:   Allergen Reactions    Ciprofloxacin Anaphylaxis    Darvocet a500 [propoxyphene n-acetaminophen] Hives    Oxycodone Hives         REVIEW OF SYSTEMS   Constitution: Negative. Negative for chills, fever and night sweats.   HENT: Negative for congestion and headaches.    Eyes: Negative for blurred vision, left vision loss and right vision loss.   Cardiovascular: Negative for chest pain and syncope.   Respiratory: Negative for cough and shortness of breath.    Endocrine: Negative for polydipsia, polyphagia and polyuria.   Hematologic/Lymphatic: Negative for bleeding problem. Does not bruise/bleed easily.   Skin: Negative for dry skin, itching and rash.   Musculoskeletal: Negative for falls. Positive for left shoulder pain   Gastrointestinal: Negative for abdominal pain and bowel incontinence.   Genitourinary: Negative for bladder incontinence and nocturia.   Neurological: Negative for disturbances in coordination, loss of balance and seizures.   Psychiatric/Behavioral: Negative for depression. The patient does not have insomnia.    Allergic/Immunologic: Negative for hives and persistent infections.     PHYSICAL EXAMINATION    Vitals: BP (!) 149/96   Pulse 80   Ht 5' (1.524 m)   Wt 54.2 kg (119 lb 6.1 oz)   LMP 02/01/2013   BMI 23.31 kg/m²     General: The patient appears active and healthy with no apparent physical problems.  No disturbance of mood or affect is demonstrated. Alert and Oriented.    HEENT: Eyes normal, pupils equally round, nose normal.    Resp: Equal and symmetrical chest rises. No wheezing    CV: Regular rate    Neck: Supple; nonpainful range of motion.    Extremities: no cyanosis, clubbing, edema, or diffuse swelling.  Palpable pulses, good capillary refill of the digits.  No coolness, discoloration, edema or obvious varicosities.    Skin: no lesions  noted.    Lymphatic: no detected adenopathy in the upper or lower extremities.    Neurologic: normal mental status, normal reflexes, normal gait and balance.  Patient is alert and oriented to person, place and time.  No flaccidity or spasticity is noted.  No motor or sensory deficits are noted.  Light touch is intact    Orthopaedic: SHOULDER EXAM - LEFT    Inspection:   Normal skin color and appearance with no scars.  No muscle atrophy noted.  No scapular winging.      Palpation: No tenderness of the acromioclavicular joint, lateral edge of the acromion, , trapezius muscle or scapulothoracic bursa.  + tenderness biceps tendon    ROM:      PROM:     FE - 180°    Abd/ER -  90° with pain  Abd/IR -  45°   Add/ER -  60° With pain     AROM:    FE - 180°    Abd/ER -  90°  Abd/IR -  45°   Add/ER -  60°         Tests:     + Uriostegui, + Neer's, - Cross Arm Adduction, - [place], - Yerguson,  + Speed. - Belly Press,  +Jobes, - Lift Off    Stability: - sulcus, - apprehension, - relocation, - load and shift, + DLS      Motor:  Rotator cuff strength is 4/5 supraspinatus, 5/5 infraspinatus, 5/5 subscapularis. Biceps, triceps and deltoid strength is 5/5.      Neuro     Distally there are no paresthesias, and sensation is intact to light touch in the median, ulnar, and radial distributions.  Reflexes are 2/2 biceps, triceps and brachioradialis.        IMAGING    I reviewed an outside MRI which demonstrates a high-grade tear involving the anterior surface of the supraspinatus with significant biceps tenosynovitis partial-thickness upper border subscapularis tendon tearing.  No significant glenohumeral joint chondral changes.  Small degenerative SLAP tear noted.    IMPRESSION       ICD-10-CM ICD-9-CM   1. Rotator cuff tear, non-traumatic, left  M75.102 727.61   2. Biceps tendinopathy, left  M67.922 727.9         MEDICATIONS PRESCRIBED      Hydrocodone 7.5/325 mg      RECOMMENDATIONS     Surgical versus non-surgical options discussed  today; left shoulder arthroscopy with rotator cuff repair, extensive debridement, sub-acromial decompression, and possible open sub-pectoral biceps tenodesis versus arthroscopic biceps tenodesis  The patient elected to proceed with operative intervention after all risks, benefits, and alternatives were discussed with the patient.  The risks of surgery include bleeding, scar formation, injuries to nerves, arteries and veins, need for additional surgeries, blood clots, infections, inability to return to the same level of the performance and the risk of anesthesia.   Informed consent was obtained  Physical therapy was ordered  Fitted for sling today      All questions were answered, pt will contact us for questions or concerns in the interim.        Soren Bravo PA-C, [facility]

## 2024-08-05 DIAGNOSIS — M67.922 BICEPS TENDINOPATHY, LEFT: ICD-10-CM

## 2024-08-05 DIAGNOSIS — M75.102 ROTATOR CUFF TEAR, NON-TRAUMATIC, LEFT: Primary | ICD-10-CM

## 2024-08-06 DIAGNOSIS — E78.01 FAMILIAL HYPERCHOLESTEROLEMIA: ICD-10-CM

## 2024-08-06 RX ORDER — ATORVASTATIN CALCIUM 40 MG/1
40 TABLET, FILM COATED ORAL DAILY
Qty: 90 TABLET | Refills: 3 | Status: SHIPPED | OUTPATIENT
Start: 2024-08-06 | End: 2025-08-06

## 2024-08-07 ENCOUNTER — PATIENT MESSAGE (OUTPATIENT)
Dept: SPORTS MEDICINE | Facility: CLINIC | Age: 64
End: 2024-08-07
Payer: COMMERCIAL

## 2024-08-07 ENCOUNTER — ANESTHESIA EVENT (OUTPATIENT)
Dept: SURGERY | Facility: HOSPITAL | Age: 64
End: 2024-08-07
Payer: COMMERCIAL

## 2024-08-07 ENCOUNTER — TELEPHONE (OUTPATIENT)
Dept: SPORTS MEDICINE | Facility: CLINIC | Age: 64
End: 2024-08-07
Payer: COMMERCIAL

## 2024-08-08 ENCOUNTER — HOSPITAL ENCOUNTER (OUTPATIENT)
Facility: HOSPITAL | Age: 64
Discharge: HOME | End: 2024-08-08
Attending: ORTHOPAEDIC SURGERY | Admitting: ORTHOPAEDIC SURGERY
Payer: COMMERCIAL

## 2024-08-08 ENCOUNTER — ANESTHESIA (OUTPATIENT)
Dept: SURGERY | Facility: HOSPITAL | Age: 64
End: 2024-08-08
Payer: COMMERCIAL

## 2024-08-08 VITALS
HEART RATE: 63 BPM | DIASTOLIC BLOOD PRESSURE: 67 MMHG | HEIGHT: 60 IN | OXYGEN SATURATION: 98 % | WEIGHT: 119 LBS | RESPIRATION RATE: 15 BRPM | SYSTOLIC BLOOD PRESSURE: 138 MMHG | TEMPERATURE: 98 F | BODY MASS INDEX: 23.36 KG/M2

## 2024-08-08 DIAGNOSIS — M75.102 ROTATOR CUFF TEAR, NON-TRAUMATIC, LEFT: ICD-10-CM

## 2024-08-08 DIAGNOSIS — M67.922 BICEPS TENDINOPATHY, LEFT: ICD-10-CM

## 2024-08-08 PROCEDURE — 25000003 PHARM REV CODE 250: Performed by: ORTHOPAEDIC SURGERY

## 2024-08-08 PROCEDURE — 63600175 PHARM REV CODE 636 W HCPCS: Performed by: ORTHOPAEDIC SURGERY

## 2024-08-08 PROCEDURE — 27201423 OPTIME MED/SURG SUP & DEVICES STERILE SUPPLY: Performed by: ORTHOPAEDIC SURGERY

## 2024-08-08 PROCEDURE — D9220A PRA ANESTHESIA: Mod: ANES,,, | Performed by: ANESTHESIOLOGY

## 2024-08-08 PROCEDURE — 63600175 PHARM REV CODE 636 W HCPCS: Performed by: NURSE ANESTHETIST, CERTIFIED REGISTERED

## 2024-08-08 PROCEDURE — 25000003 PHARM REV CODE 250: Performed by: STUDENT IN AN ORGANIZED HEALTH CARE EDUCATION/TRAINING PROGRAM

## 2024-08-08 PROCEDURE — D9220A PRA ANESTHESIA: Mod: CRNA,,, | Performed by: NURSE ANESTHETIST, CERTIFIED REGISTERED

## 2024-08-08 PROCEDURE — 37000008 HC ANESTHESIA 1ST 15 MINUTES: Performed by: ORTHOPAEDIC SURGERY

## 2024-08-08 PROCEDURE — 36000710: Performed by: ORTHOPAEDIC SURGERY

## 2024-08-08 PROCEDURE — 36000711: Performed by: ORTHOPAEDIC SURGERY

## 2024-08-08 PROCEDURE — 63600175 PHARM REV CODE 636 W HCPCS: Performed by: PHYSICIAN ASSISTANT

## 2024-08-08 PROCEDURE — 25000003 PHARM REV CODE 250: Performed by: PHYSICIAN ASSISTANT

## 2024-08-08 PROCEDURE — 29826 SHO ARTHRS SRG DECOMPRESSION: CPT | Mod: LT,,, | Performed by: ORTHOPAEDIC SURGERY

## 2024-08-08 PROCEDURE — 37000009 HC ANESTHESIA EA ADD 15 MINS: Performed by: ORTHOPAEDIC SURGERY

## 2024-08-08 PROCEDURE — 99900035 HC TECH TIME PER 15 MIN (STAT)

## 2024-08-08 PROCEDURE — 71000033 HC RECOVERY, INTIAL HOUR: Performed by: ORTHOPAEDIC SURGERY

## 2024-08-08 PROCEDURE — 71000015 HC POSTOP RECOV 1ST HR: Performed by: ORTHOPAEDIC SURGERY

## 2024-08-08 PROCEDURE — 71000039 HC RECOVERY, EACH ADD'L HOUR: Performed by: ORTHOPAEDIC SURGERY

## 2024-08-08 PROCEDURE — 94761 N-INVAS EAR/PLS OXIMETRY MLT: CPT

## 2024-08-08 PROCEDURE — 23430 REPAIR BICEPS TENDON: CPT | Mod: 51,LT,, | Performed by: ORTHOPAEDIC SURGERY

## 2024-08-08 PROCEDURE — 63600175 PHARM REV CODE 636 W HCPCS: Performed by: ANESTHESIOLOGY

## 2024-08-08 PROCEDURE — 25000003 PHARM REV CODE 250: Performed by: NURSE ANESTHETIST, CERTIFIED REGISTERED

## 2024-08-08 PROCEDURE — 64416 NJX AA&/STRD BRCH PL NFS IMG: CPT | Performed by: ANESTHESIOLOGY

## 2024-08-08 PROCEDURE — C1713 ANCHOR/SCREW BN/BN,TIS/BN: HCPCS | Performed by: ORTHOPAEDIC SURGERY

## 2024-08-08 PROCEDURE — C1751 CATH, INF, PER/CENT/MIDLINE: HCPCS | Performed by: ANESTHESIOLOGY

## 2024-08-08 PROCEDURE — 63600175 PHARM REV CODE 636 W HCPCS: Performed by: STUDENT IN AN ORGANIZED HEALTH CARE EDUCATION/TRAINING PROGRAM

## 2024-08-08 PROCEDURE — 29827 SHO ARTHRS SRG RT8TR CUF RPR: CPT | Mod: LT,,, | Performed by: ORTHOPAEDIC SURGERY

## 2024-08-08 DEVICE — SWIVELOCK, SP PEEK KL 4.75MM
Type: IMPLANTABLE DEVICE | Site: SHOULDER | Status: FUNCTIONAL
Brand: ARTHREX®

## 2024-08-08 DEVICE — PROXIMAL TENODESIS IMPLANT SYSTEM REV: 0
Type: IMPLANTABLE DEVICE | Site: SHOULDER | Status: FUNCTIONAL
Brand: ARTHREX®

## 2024-08-08 DEVICE — DBL LOADED 4.75MM PEEK SWVLK
Type: IMPLANTABLE DEVICE | Site: SHOULDER | Status: FUNCTIONAL
Brand: ARTHREX®

## 2024-08-08 DEVICE — MENISCAL ROOT KIT
Type: IMPLANTABLE DEVICE | Site: SHOULDER | Status: FUNCTIONAL
Brand: ARTHREX®

## 2024-08-08 RX ORDER — PROPOFOL 10 MG/ML
VIAL (ML) INTRAVENOUS CONTINUOUS PRN
Status: DISCONTINUED | OUTPATIENT
Start: 2024-08-08 | End: 2024-08-08

## 2024-08-08 RX ORDER — BACITRACIN ZINC 500 UNIT/G
OINTMENT (GRAM) TOPICAL
Status: DISCONTINUED | OUTPATIENT
Start: 2024-08-08 | End: 2024-08-08 | Stop reason: HOSPADM

## 2024-08-08 RX ORDER — MUPIROCIN 20 MG/G
OINTMENT TOPICAL
Status: DISCONTINUED | OUTPATIENT
Start: 2024-08-08 | End: 2024-08-08 | Stop reason: HOSPADM

## 2024-08-08 RX ORDER — MIDAZOLAM HYDROCHLORIDE 1 MG/ML
INJECTION INTRAMUSCULAR; INTRAVENOUS
Status: DISCONTINUED | OUTPATIENT
Start: 2024-08-08 | End: 2024-08-08

## 2024-08-08 RX ORDER — HYDROMORPHONE HYDROCHLORIDE 2 MG/ML
INJECTION, SOLUTION INTRAMUSCULAR; INTRAVENOUS; SUBCUTANEOUS
Status: DISCONTINUED | OUTPATIENT
Start: 2024-08-08 | End: 2024-08-08

## 2024-08-08 RX ORDER — LIDOCAINE HYDROCHLORIDE 20 MG/ML
INJECTION INTRAVENOUS
Status: DISCONTINUED | OUTPATIENT
Start: 2024-08-08 | End: 2024-08-08

## 2024-08-08 RX ORDER — ONDANSETRON HYDROCHLORIDE 2 MG/ML
4 INJECTION, SOLUTION INTRAVENOUS DAILY PRN
Status: DISCONTINUED | OUTPATIENT
Start: 2024-08-08 | End: 2024-08-08 | Stop reason: HOSPADM

## 2024-08-08 RX ORDER — ACETAMINOPHEN 500 MG
1000 TABLET ORAL
Status: COMPLETED | OUTPATIENT
Start: 2024-08-08 | End: 2024-08-08

## 2024-08-08 RX ORDER — FENTANYL CITRATE 50 UG/ML
INJECTION, SOLUTION INTRAMUSCULAR; INTRAVENOUS
Status: DISCONTINUED | OUTPATIENT
Start: 2024-08-08 | End: 2024-08-08

## 2024-08-08 RX ORDER — MIDAZOLAM HYDROCHLORIDE 1 MG/ML
1 INJECTION, SOLUTION INTRAMUSCULAR; INTRAVENOUS
Status: DISCONTINUED | OUTPATIENT
Start: 2024-08-08 | End: 2024-08-08 | Stop reason: HOSPADM

## 2024-08-08 RX ORDER — NEOSTIGMINE METHYLSULFATE 0.5 MG/ML
INJECTION INTRAVENOUS
Status: DISCONTINUED | OUTPATIENT
Start: 2024-08-08 | End: 2024-08-08

## 2024-08-08 RX ORDER — SCOLOPAMINE TRANSDERMAL SYSTEM 1 MG/1
1 PATCH, EXTENDED RELEASE TRANSDERMAL
Status: DISCONTINUED | OUTPATIENT
Start: 2024-08-08 | End: 2024-08-08 | Stop reason: HOSPADM

## 2024-08-08 RX ORDER — PHENYLEPHRINE HYDROCHLORIDE 10 MG/ML
INJECTION INTRAVENOUS
Status: DISCONTINUED | OUTPATIENT
Start: 2024-08-08 | End: 2024-08-08

## 2024-08-08 RX ORDER — ROCURONIUM BROMIDE 10 MG/ML
INJECTION, SOLUTION INTRAVENOUS
Status: DISCONTINUED | OUTPATIENT
Start: 2024-08-08 | End: 2024-08-08

## 2024-08-08 RX ORDER — KETAMINE HYDROCHLORIDE 100 MG/ML
INJECTION, SOLUTION INTRAMUSCULAR; INTRAVENOUS
Status: DISCONTINUED | OUTPATIENT
Start: 2024-08-08 | End: 2024-08-08

## 2024-08-08 RX ORDER — EPINEPHRINE 1 MG/ML
INJECTION, SOLUTION, CONCENTRATE INTRAVENOUS
Status: DISCONTINUED | OUTPATIENT
Start: 2024-08-08 | End: 2024-08-08 | Stop reason: HOSPADM

## 2024-08-08 RX ORDER — ROPIVACAINE HYDROCHLORIDE 5 MG/ML
INJECTION, SOLUTION EPIDURAL; INFILTRATION; PERINEURAL
Status: COMPLETED | OUTPATIENT
Start: 2024-08-08 | End: 2024-08-08

## 2024-08-08 RX ORDER — PROPOFOL 10 MG/ML
VIAL (ML) INTRAVENOUS
Status: DISCONTINUED | OUTPATIENT
Start: 2024-08-08 | End: 2024-08-08

## 2024-08-08 RX ORDER — DEXAMETHASONE SODIUM PHOSPHATE 4 MG/ML
INJECTION, SOLUTION INTRA-ARTICULAR; INTRALESIONAL; INTRAMUSCULAR; INTRAVENOUS; SOFT TISSUE
Status: DISCONTINUED | OUTPATIENT
Start: 2024-08-08 | End: 2024-08-08

## 2024-08-08 RX ORDER — IBUPROFEN 200 MG
200 TABLET ORAL EVERY 6 HOURS PRN
COMMUNITY

## 2024-08-08 RX ORDER — GLUCAGON 1 MG
1 KIT INJECTION
Status: DISCONTINUED | OUTPATIENT
Start: 2024-08-08 | End: 2024-08-08 | Stop reason: HOSPADM

## 2024-08-08 RX ORDER — ROPIVACAINE HYDROCHLORIDE 2 MG/ML
INJECTION, SOLUTION EPIDURAL; INFILTRATION; PERINEURAL CONTINUOUS
Status: DISCONTINUED | OUTPATIENT
Start: 2024-08-08 | End: 2024-08-08 | Stop reason: HOSPADM

## 2024-08-08 RX ORDER — FENTANYL CITRATE 50 UG/ML
100 INJECTION, SOLUTION INTRAMUSCULAR; INTRAVENOUS
Status: DISCONTINUED | OUTPATIENT
Start: 2024-08-08 | End: 2024-08-08 | Stop reason: HOSPADM

## 2024-08-08 RX ORDER — CELECOXIB 200 MG/1
400 CAPSULE ORAL
Status: COMPLETED | OUTPATIENT
Start: 2024-08-08 | End: 2024-08-08

## 2024-08-08 RX ORDER — FENTANYL CITRATE 50 UG/ML
25 INJECTION, SOLUTION INTRAMUSCULAR; INTRAVENOUS EVERY 5 MIN PRN
Status: DISCONTINUED | OUTPATIENT
Start: 2024-08-08 | End: 2024-08-08 | Stop reason: HOSPADM

## 2024-08-08 RX ORDER — MULTIVITAMIN
1 TABLET ORAL DAILY
COMMUNITY

## 2024-08-08 RX ORDER — SODIUM CHLORIDE 0.9 % (FLUSH) 0.9 %
10 SYRINGE (ML) INJECTION
Status: DISCONTINUED | OUTPATIENT
Start: 2024-08-08 | End: 2024-08-08 | Stop reason: HOSPADM

## 2024-08-08 RX ORDER — ACETAMINOPHEN 500 MG
500 TABLET ORAL EVERY 6 HOURS PRN
COMMUNITY

## 2024-08-08 RX ORDER — ONDANSETRON HYDROCHLORIDE 2 MG/ML
INJECTION, SOLUTION INTRAVENOUS
Status: DISCONTINUED | OUTPATIENT
Start: 2024-08-08 | End: 2024-08-08

## 2024-08-08 RX ADMIN — PROPOFOL 200 MG: 10 INJECTION, EMULSION INTRAVENOUS at 12:08

## 2024-08-08 RX ADMIN — ROPIVACAINE HYDROCHLORIDE 10 ML: 5 INJECTION EPIDURAL; INFILTRATION; PERINEURAL at 10:08

## 2024-08-08 RX ADMIN — MIDAZOLAM 1 MG: 1 INJECTION INTRAMUSCULAR; INTRAVENOUS at 10:08

## 2024-08-08 RX ADMIN — DEXAMETHASONE SODIUM PHOSPHATE 8 MG: 4 INJECTION, SOLUTION INTRAMUSCULAR; INTRAVENOUS at 12:08

## 2024-08-08 RX ADMIN — PROPOFOL 150 MCG/KG/MIN: 10 INJECTION, EMULSION INTRAVENOUS at 12:08

## 2024-08-08 RX ADMIN — SODIUM CHLORIDE: 9 INJECTION, SOLUTION INTRAVENOUS at 12:08

## 2024-08-08 RX ADMIN — ROCURONIUM BROMIDE 10 MG: 10 INJECTION, SOLUTION INTRAVENOUS at 02:08

## 2024-08-08 RX ADMIN — PHENYLEPHRINE HYDROCHLORIDE 100 MCG: 10 INJECTION INTRAVENOUS at 12:08

## 2024-08-08 RX ADMIN — CEFAZOLIN 2 G: 2 INJECTION, POWDER, FOR SOLUTION INTRAMUSCULAR; INTRAVENOUS at 12:08

## 2024-08-08 RX ADMIN — SCOPALAMINE 1 PATCH: 1 PATCH, EXTENDED RELEASE TRANSDERMAL at 10:08

## 2024-08-08 RX ADMIN — CELECOXIB 400 MG: 200 CAPSULE ORAL at 10:08

## 2024-08-08 RX ADMIN — NEOSTIGMINE METHYLSULFATE 5 MG: 0.5 INJECTION INTRAVENOUS at 02:08

## 2024-08-08 RX ADMIN — ROCURONIUM BROMIDE 50 MG: 10 INJECTION, SOLUTION INTRAVENOUS at 12:08

## 2024-08-08 RX ADMIN — LIDOCAINE HYDROCHLORIDE 100 MG: 20 INJECTION INTRAVENOUS at 12:08

## 2024-08-08 RX ADMIN — SODIUM CHLORIDE, SODIUM GLUCONATE, SODIUM ACETATE, POTASSIUM CHLORIDE, MAGNESIUM CHLORIDE, SODIUM PHOSPHATE, DIBASIC, AND POTASSIUM PHOSPHATE: .53; .5; .37; .037; .03; .012; .00082 INJECTION, SOLUTION INTRAVENOUS at 01:08

## 2024-08-08 RX ADMIN — ONDANSETRON 4 MG: 2 INJECTION INTRAMUSCULAR; INTRAVENOUS at 12:08

## 2024-08-08 RX ADMIN — FENTANYL CITRATE 25 MCG: 50 INJECTION INTRAMUSCULAR; INTRAVENOUS at 03:08

## 2024-08-08 RX ADMIN — ROCURONIUM BROMIDE 20 MG: 10 INJECTION, SOLUTION INTRAVENOUS at 01:08

## 2024-08-08 RX ADMIN — KETAMINE HYDROCHLORIDE 20 MG: 100 INJECTION INTRAMUSCULAR; INTRAVENOUS at 12:08

## 2024-08-08 RX ADMIN — PHENYLEPHRINE HYDROCHLORIDE 100 MCG: 10 INJECTION INTRAVENOUS at 01:08

## 2024-08-08 RX ADMIN — Medication: at 03:08

## 2024-08-08 RX ADMIN — ACETAMINOPHEN 1000 MG: 500 TABLET ORAL at 10:08

## 2024-08-08 RX ADMIN — MIDAZOLAM HYDROCHLORIDE 2 MG: 2 INJECTION, SOLUTION INTRAMUSCULAR; INTRAVENOUS at 12:08

## 2024-08-08 RX ADMIN — FENTANYL CITRATE 100 MCG: 50 INJECTION, SOLUTION INTRAMUSCULAR; INTRAVENOUS at 12:08

## 2024-08-08 RX ADMIN — MUPIROCIN: 20 OINTMENT TOPICAL at 10:08

## 2024-08-08 RX ADMIN — FENTANYL CITRATE 25 MCG: 50 INJECTION INTRAMUSCULAR; INTRAVENOUS at 10:08

## 2024-08-08 RX ADMIN — GLYCOPYRROLATE 0.6 MG: 0.2 INJECTION, SOLUTION INTRAMUSCULAR; INTRAVENOUS at 02:08

## 2024-08-08 RX ADMIN — HYDROMORPHONE HYDROCHLORIDE 0.4 MG: 2 INJECTION, SOLUTION INTRAMUSCULAR; INTRAVENOUS; SUBCUTANEOUS at 02:08

## 2024-08-08 RX ADMIN — PROPOFOL 60 MG: 10 INJECTION, EMULSION INTRAVENOUS at 02:08

## 2024-08-08 NOTE — ANESTHESIA PROCEDURE NOTES
Left interscalene catheter    Patient location during procedure: pre-op   Block not for primary anesthetic.  Reason for block: at surgeon's request and post-op pain management   Post-op Pain Location: Left shoulder pain   Start time: 8/8/2024 10:51 AM  Timeout: 8/8/2024 10:50 AM   End time: 8/8/2024 11:00 AM    Staffing  Authorizing Provider: Jena Fischer MD  Performing Provider: Jena Fischer MD    Staffing  Performed by: Jena Fischer MD  Authorized by: Jena Fischer MD    Preanesthetic Checklist  Completed: patient identified, IV checked, site marked, risks and benefits discussed, surgical consent, monitors and equipment checked, pre-op evaluation and timeout performed  Peripheral Block  Patient position: sitting  Prep: ChloraPrep and site prepped and draped  Patient monitoring: heart rate, cardiac monitor, continuous pulse ox, continuous capnometry and frequent blood pressure checks  Block type: interscalene  Laterality: left  Injection technique: continuous  Needle  Needle type: Tuohy   Needle gauge: 18 G  Needle length: 2 in  Needle localization: anatomical landmarks and ultrasound guidance  Catheter type: non-stimulating  Catheter size: 20 G  Test dose: lidocaine 1.5% with Epi 1-to-200,000 and negative   -ultrasound image captured on disc.  Assessment  Injection assessment: negative aspiration, negative parasthesia and local visualized surrounding nerve  Paresthesia pain: none  Heart rate change: no  Slow fractionated injection: yes  Pain Tolerance: comfortable throughout block and no complaints  Medications:    Medications: ropivacaine (NAROPIN) injection 0.5% - Perineural   10 mL - 8/8/2024 10:55:00 AM    Additional Notes  VSS.  [facility] RN monitoring vitals throughout procedure.  Patient tolerated procedure well.     With 10mL normal saline, 1:200,000 epi

## 2024-08-08 NOTE — ANESTHESIA PROCEDURE NOTES
Intubation    Date/Time: 8/8/2024 12:25 PM    Performed by: Phyllis Mccoy CRNA  Authorized by: Nathan Barcenas MD    Intubation:     Induction:  Intravenous    Intubated:  Postinduction    Mask Ventilation:  Easy mask    Attempts:  1    Attempted By:  CRNA    Method of Intubation:  Video laryngoscopy    Blade:  Peralta 3    Laryngeal View Grade: Grade I - full view of cords      Difficult Airway Encountered?: No      Complications:  None    Airway Device:  Oral endotracheal tube    Airway Device Size:  7.0    Style/Cuff Inflation:  Cuffed    Inflation Amount (mL):  5    Tube secured:  21    Secured at:  The lips    Placement Verified By:  Capnometry    Complicating Factors:  None    Findings Post-Intubation:  BS equal bilateral

## 2024-08-08 NOTE — TRANSFER OF CARE
Anesthesia Transfer of Care Note    Patient: Jess Gresham    Procedure(s) Performed: Procedure(s) (LRB):  REPAIR, ROTATOR CUFF, ARTHROSCOPIC (Left)  ARTHROSCOPY, SHOULDER, WITH SUBACROMIAL SPACE DECOMPRESSION (Left)  DEBRIDEMENT, SHOULDER, ARTHROSCOPIC (Left)  TENODESIS, BICEPS, OPEN (Left)    Patient location: PACU    Anesthesia Type: general    Transport from OR: Transported from OR on 6-10 L/min O2 by face mask with adequate spontaneous ventilation    Post pain: adequate analgesia    Post assessment: no apparent anesthetic complications and tolerated procedure well    Post vital signs: stable    Level of consciousness: responds to stimulation and sedated    Nausea/Vomiting: no nausea/vomiting    Complications: none    Transfer of care protocol was followed      Last vitals: Visit Vitals  /75 (BP Location: Right arm, Patient Position: Lying)   Pulse 80   Temp 36.1 °C (97 °F) (Temporal)   Resp 18   Ht 5' (1.524 m)   Wt 54 kg (119 lb)   LMP 02/01/2013   SpO2 100%   Breastfeeding No   BMI 23.24 kg/m²

## 2024-08-08 NOTE — DISCHARGE INSTRUCTIONS
POST-OPERATIVE DISCHARGE INSTRUCTIONS    Diet: Ice chips, clear liquids, and then diet as tolerated.  Drink plenty of liquids.  Ice the area at least three times a day (20 minutes per session).    Elevate the extremity above the level of the heart to help reduce swelling.  Keep arm in sling and remain non-weightbearing until further notice.  Pain medication can be taken every four to six hours as needed.  It is helpful to take pain medication prior to physical therapy.  Any activity that requires precise thinking or accuracy should be avoided for a minimum of 72 hours after surgery and while on narcotic pain medication.  This includes operating machinery and/or driving a vehicle.  All sutures/staples will be removed approximately 14 days from the time of surgery. Leave steri-strips (skin tapes) in place until sutures are removed.  If skin glue is used instead of stitches, do not apply any ointments or solutions to the incision.  Keep the incision dry.  The skin glue will peel off in 3-4 weeks.  Dressing change directions, unless otherwise instructed:     ? Shoulder scope:  Remove dressings 48 hours after surgery and start using waterproof Band-Aids over the incision sites.      All casts, splints, braces, slings, crutches, abduction pillows, etc. are to be worn as instructed.    Leonardo Hose are often used following knee surgery to help with swelling.  They can be removed for 2-3 hours daily as needed.  If the hose become uncomfortable after a few days, switch to an Ace Wrap if desired.  Keep the incision dry for 10-14 days.  A waterproof dressing ([facility] or [facility]) can be used to shower.  No bath, pool, or hot tub until instructed.  You should notify our office if you notice any of the following:  A temperature greater than 101 F  Severe increased pain  Excessive drainage or redness of the incision  Calf swelling and pain  Chest pain  Your post-op follow up appointment will be approximately 14 days from the time of  surgery.  If you do not have an appointment scheduled, please call our office and schedule within 1-2 days.   If you have any problems or questions, please call our office at [phone].

## 2024-08-08 NOTE — BRIEF OP NOTE
[facility]  Brief Operative Note    Surgery Date: 8/8/2024     Surgeon(s) and Role:     * Wilbur Pa MD - Primary    Assisting Provider:     * Corona Lopez MD - First Assist      Pre-Operative Diagnosis: Rotator cuff tear, non-traumatic, left [M75.102]  Biceps tendinopathy, left [M67.922]    Post-Operative Diagnosis: Post-Op Diagnosis Codes:     * Rotator cuff tear, non-traumatic, left [M75.102]     * Biceps tendinopathy, left [M67.922]    Procedure(s) (LRB):  REPAIR, ROTATOR CUFF, ARTHROSCOPIC (Left)  ARTHROSCOPY, SHOULDER, WITH SUBACROMIAL SPACE DECOMPRESSION (Left)  DEBRIDEMENT, SHOULDER, ARTHROSCOPIC (Left)  TENODESIS, BICEPS, OPEN (Left)    Anesthesia: General    Operative Findings: See Op note.    Estimated Blood Loss: * No values recorded between 8/8/2024  1:25 PM and 8/8/2024  3:08 PM *         Specimens:   Specimen (24h ago, onward)      None              Discharge Note    OUTCOME: Patient tolerated treatment/procedure well without complication and is now ready for discharge.    DISPOSITION: Home or Self Care    FINAL DIAGNOSIS:  Post-Op Diagnosis Codes:     * Rotator cuff tear, non-traumatic, left [M75.102]     * Biceps tendinopathy, left [M67.922]    FOLLOWUP: In clinic    DISCHARGE INSTRUCTIONS: See attached.

## 2024-08-08 NOTE — OP NOTE
"Date of Procedure: 8/8/2024    Preoperative Diagnosis:  Left shoulder Tear, Rotator Cuff, Non-traumatic M75.100  Left shoulder Tendinitis, Biceps M75.20  Left shoulder  superior labral tear  Left shoulder  synovitis  Left shoulder impingement syndrome    Postoperative Diagnosis:   Left shoulder Tear, Rotator Cuff, Non-traumatic M75.100  Left shoulder Tendinitis, Biceps M75.20  Left shoulder superior labral tear  Left shoulder synovitis  Left shoulder impingement syndrome     Procedures Performed:  1. Left shoulder Arthroscopic rotator cuff repair CPT - 84369    2. Left shoulder Open Biceps Tenodesis" - 87390    3. Left shoulder Arthroscopic extensive debridement CPT - 45932    4. Left shoulder Arthroscopic subacromial decompression and bursectomy CPT - 22117      Surgeons and Role:     * Wilbur Pa MD - Primary    Assistant:Corona Lopez MD - Fellow    Anesthesia: General/Regional    Complications: None    Implants:   Implant Name Type Inv. Item Serial No.  Lot No. LRB No. Used Action   PEEK SWIVELOCK SUTURE ANCHOR, 4.75 X 22MM     [id number] Left 1 Implanted   PEEK SWIVELOCK SUTURE ANCHOR, 4.75 X 22MM     [id number] Left 1 Implanted   ANCHOR SWIVELCK KL 4.75X24.5MM - [id number]  ANCHOR SWIVELCK KL 4.75X24.5MM  [employer] [id number] Left 1 Implanted   ANCHOR SWIVELCK KL 4.75X24.5MM - [id number]  ANCHOR SWIVELCK KL 4.75X24.5MM  [employer] [id number] Left 1 Implanted   SYS MENISCAL ROOT REPAIR - [id number]  SYS MENISCAL ROOT REPAIR  [employer] [id number] Left 1 Implanted   SYS IMPL PROXIMAL TENODESIS - [id number]  SYS IMPL PROXIMAL TENODESIS  [employer] [id number] Left 1 Implanted       Arthroscopic Findings:   Glenohumeral joint  No significant articular cartilage lesions to the glenoid or the humerus.  Small area of chondromalacia of the anterior aspect of the humerus consistent with an unstable long head of the biceps tendon.  Evaluation of the rotator cuff demonstrated la full-thickness tear of the " supraspinatus tendon.  Intact infraspinatus.  Intact teres minor.  Intact upper border the subscapularis with only some partial fraying as it exited the intertubercular groove with the long head of the biceps tendon was likely unstable.    Subacromial space  Full-thickness rotator cuff tear with subacromial impingement noted with fraying of the CA ligament.      Indication for Procedure: 64 y.o. 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  She was referred to me from a former patient for 2nd opinion.  She states she started to notice shoulder pain 4 months ago. She does not recall a specific event that caused pain, but has noticed progressive pain while swimming. She states she noticed significant pain when taking off a sports bra. She has a previous CSI 8 in L SA with zero relief.  She wants to continue being very active and competitive with swimming.  Her history, physical and imaging was consistent with the above-stated diagnosis.  We discussed the risks, benefits and alternatives of surgery.  She elected proceed with surgical intervention.    Surgery in Detail:  The patient was marked identified in the holding room.  She was brought back to the operating room and placed in the lateral decubitus position.  After general endotracheal anesthesia was administered the left upper extremity was prepped and draped in usual sterile fashion for a shoulder arthroscopy. The body was secured and well padded in a bean bag. Preoperative antibiotics were given within 1 hour the procedure.  A time-out was taken prior starting.  A posterior portal was created after insufflation of the joint with sterile saline.  A diagnostic arthroscopy was performed with the above-stated findings.    Arthroscopic Extensive Debridement  Mimi were brought in though a rotator interval portal and debridement was done of the rotator interval where there was synovitis in addition to the superior labrum.  There was  degenerative fraying of the superior labrum anterior labrum extending posteriorly.   Evaluation of the biceps-labral complex demonstrated significantly degenerative superior labrum.  At this point we elected to perform a biceps tenotomy for later biceps tenodesis.  Mimi were also used to debride the undersurface fraying of the rotator cuff and to prepare the area of the footprint on the greater tuberosity.  We debrided the greater tuberosity using our shaver as well as undersurface of the rotator cuff.  The upper border the subscapularis was debrided.    Arthroscopic Subacromial Decompression  We then moved to the subacromial space and a lateral portal was created. A complete bursectomy was done for both visualization and removal of the pathologic bursa.  We then demarcated the borders of the acromion.  This was done using our vapor.  We then brought in a 5.5 mm bur and converted the type 2 acromion into a type 1 acromion.        Arthroscopic Rotator Cuff Repair  We then prepared the greater tuberosity footprint with a 5.5 bur to remove any tissue and to provide a biological bed for healing.  We then placed 2 medial row anchors with double loaded 4.75 mm peek SwiveLock anchors.  These were passed in horizontal mattress Fashions from anterior to posterior.  They were then tied with arthroscopic knot techniques from posterior to anterior.  They were then brought over to 2 lateral row knotless 4.75 mm peek SwiveLock anchors for a transosseous equivalent double row rotator cuff repair.    Open Subpectoral Biceps Tenodesis  We then re-prepped the anterior aspect of the shoulder and made a small axillary incision extending inferiorly from the pectoralis major tendon.  Incision was carried down through the subcutaneous tissue.  The fascia was opened in the inferior border of the pectoralis major tendon was retracted superiorly and laterally.  The long head of the biceps tendon was identified.  It was brought out from  the incision and a 2. FiberLoop was used to secure the proximal 2-3 cm of the myotendinous junction.  We amputated the remainder of the long head of the biceps tendon.  The anterior aspect of the humerus was then decorticated in the subpectoral region.  A drill was then used for our unicortical button.  The button was then loaded and flipped intraosseous.  A tension slide technique was used to reduce the tendon to the anterior aspect of the humerus.  One suture was passed back through the tendon for a locking construct.  Knots were then tied to secure the construct.  The incision was then copiously irrigated with normal saline.  The incision was closed with 2-0 Vicryl sutures, 3-0 Monocryl and Dermabond.  The remainder of the portal sites were closed with 3-0 nylon sutures. Adaptic, bacitracin, 4x4s and ABDs were then used.  The patient was then placed in an arc brace and extubated and taken recovery.    Post-Op Plan:  Type 2 rotator cuff repair protocol with biceps tenodesis    Attestation: I was present and scrubbed for the entire procedure.

## 2024-08-08 NOTE — PLAN OF CARE
Care plan reviewed w/ pt and spouse at bedside. AVSS on RA. L shoulder dressing CDI, sling in place. CADD pump in place, education reviewed. Pain controlled w/ PRN medications. All Rx picked up by patient family. Pt states she is ready for discharge.

## 2024-08-08 NOTE — PLAN OF CARE
Pre op complete. Waiting on anesthesia consent, site valerie and update. Pt's  at bedside; belongings will be locked in locker during procedure. Pt resting comfortably with all questions addressed at this time and call light in reach.

## 2024-08-08 NOTE — ANESTHESIA PREPROCEDURE EVALUATION
"                                                                                             08/08/2024  Jess Gresham is a 64 y.o., female. H/o HLD and PONV.     Cardiology: "full workup with an echo on 1/2023 showing EF 55% and mild plaque with dilated ascending aorta of 3.8 cm. Nuclear stress on 1/2024 was negative. Carotid u/s on 3/2023 was negative. CTA on 2/2023 showed ascending aorta that was 3.8 cm. Cardiology did not feel her symptoms were due to CAD and advised to recheck echo in 1-2 years to evaluate her aortic ectasia.        Pre-op Assessment    I have reviewed the Patient Summary Reports.       I have reviewed the Medications.     Review of Systems  Anesthesia Hx:    H/O PONV History of prior surgery of interest to airway management or planning:  Previous anesthesia: General 12/11/2020  Left Knee Arthroscopy, Meniscectomy, Chondroplasty with general anesthesia.  Procedure performed at an [facility].      Airway issues documented on chart review include mask, easy, laryngeal mask airway used      Personal Hx of Anesthesia complications, Post-Operative Nausea/Vomiting, with every anesthetic, treatment not known                    Social:  Non-Smoker, Social Alcohol Use       Hematology/Oncology:  Hematology Normal   Oncology Normal                                   EENT/Dental:  EENT/Dental Normal           Cardiovascular:  Exercise tolerance: good   Hypertension, well controlled   Denies MI.  Denies CAD.     Denies Dysrhythmias.  Angina, with exertion     hyperlipidemia  Denies OTERO.  ECG has been reviewed. Mild dilation of ascending aorta, Acquired dilation of ascending aorta and aortic root,  Elevated blood pressure reading, home B/P's in normal range,   Familial hypercholesterolemia,  Aortic atherosclerosis,   intermittent episodes of chest pressure with associated lightheadedness; occur more with stress and while at work. Cardiac work-up per Cardiology,  May have mild microvascular angina      "                        Pulmonary:  Pulmonary Normal   Denies COPD.  Denies Asthma.   Denies Shortness of breath.                  Renal/:  Renal/ Normal                 Hepatic/GI:  Hepatic/GI Normal     Denies GERD. Denies Liver Disease.            Musculoskeletal:     Rotator cuff tear, non-traumatic, left,  Biceps tendinopathy, left,  H/O Acute medial meniscal tear, left,  Bilateral knee surgeries,  Left Knee Arthroscopy with Meniscectomy, Chondroplasty              Neurological:    Denies CVA. Neuromuscular Disease,  Headaches Denies Seizures.    Trigger finger, right middle finger                            Endocrine:  Endocrine Normal Denies Diabetes. Denies Hypothyroidism.          Psych:   anxiety               Physical Exam  General: Well nourished, Cooperative and Alert    Airway:  Mallampati: II   Mouth Opening: Normal  TM Distance: Normal  Tongue: Normal  Neck ROM: Normal ROM    Dental:  Intact      Past Medical History:   Diagnosis Date    Abnormal Pap smear of cervix     in early 1990s---cone biopsy which was negative---all pap normal since      Past Surgical History:   Procedure Laterality Date    CHONDROPLASTY Left 12/11/2020    Procedure: CHONDROPLASTY;  Surgeon: Mikey Amaro MD;  Location: [facility] OR;  Service: Orthopedics;  Laterality: Left;    COLONOSCOPY  2014    KNEE ARTHROSCOPY W/ MENISCECTOMY Left 12/11/2020    Procedure: ARTHROSCOPY, KNEE, WITH MENISCECTOMY and a baker cyst aspiration;  Surgeon: Mikey Amaro MD;  Location: [facility] OR;  Service: Orthopedics;  Laterality: Left;  medial     KNEE SURGERY Bilateral        Anesthesia Assessment: Preoperative EQUATION    Planned Procedure: Procedure(s) (LRB):  REPAIR, ROTATOR CUFF, ARTHROSCOPIC (Left)  ARTHROSCOPY, SHOULDER, WITH SUBACROMIAL SPACE DECOMPRESSION (Left)  DEBRIDEMENT, SHOULDER, ARTHROSCOPIC (Left)  TENODESIS, BICEPS, OPEN (Left)  Requested Anesthesia Type:General/Regional  Surgeon: Wilbur Pa MD  Service:  Orthopedics  Known or anticipated Date of Surgery:8/8/2024    Surgeon notes: reviewed    Electronic QUestionnaire Assessment completed via nurse interview with patient.        Triage considerations:     The patient has no apparent active cardiac condition (No unstable coronary Syndrome such as severe unstable angina or recent [<1 month] myocardial infarction, decompensated CHF, severe valvular   disease or significant arrhythmia)    Previous anesthesia records:LMA General, Easy airway, and No problems, H/O PONV    Last PCP note: within 1 month , within [facility]   Subspecialty notes: Cardiology: General    Other important co-morbidities: HLD and HTN       EKG 7/29/2024:  Vent. Rate : 068 BPM     Atrial Rate : 068 BPM      P-R Int : 172 ms          QRS Dur : 090 ms       QT Int : 416 ms       P-R-T Axes : 057 007 060 degrees      QTc Int : 442 ms   Normal sinus rhythm   Normal ECG   When compared with ECG of 31-JAN-2023 09:42,   Questionable change in The axis   Confirmed by Jb Serrano MD ([id number]) on 7/30/2024 8:08:17 AM       Exercise Stress Test with Myocardial Perfusion/ Spect 1/31/2023:  Interpretation Summary     Normal myocardial perfusion scan. There is no evidence of myocardial ischemia or infarction.    The gated perfusion images showed an ejection fraction of 54% post stress.    There is normal wall motion post stress.    LV cavity size is normal at stress.    The ECG portion of the study is negative for ischemia.    The patient reported chest pain at peak stress that resolved 1 minute into recovery.    There were no arrhythmias during stress.    The exercise capacity was excellent.   This is a low risk study.       Echo 1/12/2023:  Summary  The left ventricle is normal in size with normal systolic function.  The estimated ejection fraction is 55+/-5%.  Normal right ventricular size with normal right ventricular systolic function.  Normal central venous pressure (3 mmHg).  There is no pulmonary  hypertension.  The ascending aorta is mildly dilated measuring 3.8cm  The estimated PA systolic pressure is 21 mmHg.  Plaque present in the descending aorta.      Bilateral Carotid Doppler/ Ultrasound 3/1/2023:  Interpretation Summary  Mild intimal thickening but no significant plaque or stenosis in the right carotid artery  Mild intimal thickening but no significant plaque or stenosis in the left carotid artery  Antegrade flow and normal velocity in both vertebral arteries.      CTA Chest, Abd, Pelvis 2/2/2023:   Impression:   The ascending thoracic aorta measures 3.8 cm.  Otherwise, no evidence of aortic dissection or aneurysm.   Left upper lobe 3 mm pulmonary nodules.  For multiple solid nodules all <6 mm, Fleischner Society 2017 guidelines recommend no routine follow up for a low risk patient, or follow up with non-contrast chest CT at 12 months after discovery in a high risk patient.      Tests already available:  Results have been reviewed.             Instructions given. (See in Nurse's note) Preop medication instructions sent via portal message.     Optimization:  Anesthesia Preop Clinic Assessment Not Indicated    Medical Opinion Indicated: Yes, PCP       Sub-specialist consult indicated: No      Plan: Consultation:Patient's PCP for a statement of optimization             Patient  has previously scheduled Medical Appointment: 7/29/2024    Navigation: Patient is medically cleared for surgery per PCP and is a low cardiac risk.      Ht: 5'  Wt: 55.2 kg (121 lb)  BMI: 23.75  Vaccinated    Anesthesia Plan  Type of Anesthesia, risks & benefits discussed:    Anesthesia Type: Gen ETT, Regional  Intra-op Monitoring Plan: Standard ASA Monitors  Post Op Pain Control Plan: multimodal analgesia and IV/PO Opioids PRN  Induction:  IV  Airway Plan: Direct, Post-Induction  Informed Consent: Informed consent signed with the Patient and all parties understand the risks and agree with anesthesia plan.  All questions answered.    ASA Score: 2  Day of Surgery Review of History & Physical: H&P Update referred to the surgeon/provider.    Ready For Surgery From Anesthesia Perspective.     .

## 2024-08-09 NOTE — ANESTHESIA POST-OP PAIN MANAGEMENT
"Acute Pain Service Progress Note    8/9/2024 1113    Patient contacted regarding CADD infusion follow up. Reports that pain has been well controlled with the infusion pump. Denies signs of local anesthetic toxicity. PNC dressing remains clean, dry, and intact. All questions answered. Reminded patient that the infusion should be discontinued and PNC removed whenever the "infusion complete" alert is seen on the display screen or by POD 5 (8/13/24) at 12pm, whichever comes first. Encouraged patient to call the CADD 24/7 support line at [phone] or the [facility] line at [phone] for any CADD related questions/issues. Verbalizes understanding.          "

## 2024-08-13 NOTE — ANESTHESIA POSTPROCEDURE EVALUATION
Anesthesia Post Evaluation    Patient: Jess Gresham    Procedure(s) Performed: Procedure(s) (LRB):  REPAIR, ROTATOR CUFF, ARTHROSCOPIC (Left)  ARTHROSCOPY, SHOULDER, WITH SUBACROMIAL SPACE DECOMPRESSION (Left)  DEBRIDEMENT, SHOULDER, ARTHROSCOPIC (Left)  TENODESIS, BICEPS, OPEN (Left)    Final Anesthesia Type: general      Patient location during evaluation: PACU  Patient participation: Yes- Able to Participate  Level of consciousness: awake and alert and oriented  Post-procedure vital signs: reviewed and stable  Pain management: adequate  Airway patency: patent    PONV status at discharge: No PONV  Anesthetic complications: no      Cardiovascular status: blood pressure returned to baseline and hemodynamically stable  Respiratory status: unassisted, room air and spontaneous ventilation  Hydration status: euvolemic  Follow-up not needed.              Vitals Value Taken Time   /67 08/08/24 1632   Temp 36.4 °C (97.6 °F) 08/08/24 1630   Pulse 70 08/08/24 1633   Resp 18 08/08/24 1632   SpO2 98 % 08/08/24 1633   Vitals shown include unfiled device data.      Event Time   Out of Recovery 16:15:00         Pain/Reanna Score: No data recorded

## 2024-08-14 ENCOUNTER — CLINICAL SUPPORT (OUTPATIENT)
Dept: REHABILITATION | Facility: HOSPITAL | Age: 64
End: 2024-08-14
Attending: ORTHOPAEDIC SURGERY
Payer: COMMERCIAL

## 2024-08-14 DIAGNOSIS — M75.102 ROTATOR CUFF TEAR, NON-TRAUMATIC, LEFT: ICD-10-CM

## 2024-08-14 DIAGNOSIS — M25.60 DECREASED RANGE OF MOTION: Primary | ICD-10-CM

## 2024-08-14 DIAGNOSIS — M62.81 MUSCLE WEAKNESS: ICD-10-CM

## 2024-08-14 DIAGNOSIS — R26.89 DECREASED FUNCTIONAL MOBILITY: ICD-10-CM

## 2024-08-14 DIAGNOSIS — M67.922 BICEPS TENDINOPATHY, LEFT: ICD-10-CM

## 2024-08-14 PROCEDURE — 97164 PT RE-EVAL EST PLAN CARE: CPT | Mod: PN

## 2024-08-14 PROCEDURE — 97110 THERAPEUTIC EXERCISES: CPT | Mod: PN

## 2024-08-14 NOTE — PLAN OF CARE
"[facility]  Physical Therapy Re-Evaluation    Name: Jess Gresham  Clinic Number: [id number]    Therapy Diagnosis:   Encounter Diagnoses   Name Primary?    Rotator cuff tear, non-traumatic, left     Biceps tendinopathy, left     Decreased range of motion Yes    Muscle weakness     Decreased functional mobility      Physician: Wilbur Pa MD    Physician Orders: PT Eval and Treat; follow Type II RCR protocol for Wilbur Pa MD  Medical Diagnosis from Referral: Rotator cuff tear, non-traumatic, left [M75.102], Biceps tendinopathy, left   Evaluation Date: 8/14/2024  Authorization Period Expiration: 8/5/25  Plan of Care Expiration: 10/11/24  Visit # / Visits authorized: 1/ 1  FOTO: 27; goal: 63 by visit 21    Time In: 9:05  Time Out: 9:50  Total Billable Time: 45 minutes    Precautions: Standard  DOS: 8/8/24:   1. Left shoulder Arthroscopic rotator cuff repair CPT - 21413     2. Left shoulder Open Biceps Tenodesis" - 37359     3. Left shoulder Arthroscopic extensive debridement CPT - 66699     4. Left shoulder Arthroscopic subacromial decompression and bursectomy CPT - 70582    Subjective   Date of onset: 8/8/24  History of current condition - Ginger reports: she is managing the pain well with Tylenol during the day and Percocet at night as needed.  She is icing L shoulder and compliant with sling.       Medical History:   Past Medical History:   Diagnosis Date    Abnormal Pap smear of cervix     in early 1990s---cone biopsy which was negative---all pap normal since        Surgical History:   Jess Gresham  has a past surgical history that includes Knee surgery (Bilateral); Colonoscopy (2014); Knee arthroscopy w/ meniscectomy (Left, 12/11/2020); Chondroplasty (Left, 12/11/2020); Arthroscopic repair of rotator cuff of shoulder (Left, 8/8/2024); Arthroscopy of shoulder with decompression of subacromial space (Left, 8/8/2024); Arthroscopic debridement of shoulder (Left, 8/8/2024); and " tenodesis, biceps, open (Left, 8/8/2024).    Medications:   Jess has a current medication list which includes the following prescription(s): acetaminophen, atorvastatin, hydrocodone-acetaminophen, ibuprofen, multivitamin, and mupirocin.    Allergies:   Review of patient's allergies indicates:   Allergen Reactions    Ciprofloxacin Anaphylaxis    Darvocet a500 [propoxyphene n-acetaminophen] Hives    Oxycodone Hives        Imaging, none: since surgery    Prior Therapy: PT prior to sx for L shoulder  Social History: pt lives with their spouse  Occupation: works at [employer] (she is currently off; about 2 weeks after surgery)  Prior Level of Function: I with ADLs  Current Level of Function: pain and difficulty with bathing, dressing, chores, work tasks    Pain:  Current 4/10, worst 10/10, best 4/10   Location: left shoulder  Description: Sharp  Aggravating Factors: move the wrong way  Easing Factors: rest, Tylenol, Percocet    Pts goals: be able to work, swim and run    Objective     Observation: pt is pleasant and cooperative; in L shoulder sling    Posture: upright posture; L UE supported in sling      Passive Range of Motion: (deg)  Shoulder Left Right   Flexion 170 60 deg   Abduction 170 NT   ER at 45 70 10 at 0 deg   ER at 90 100 NT   IR 80 To abdomen      L anterior sh incision is clean and healing.  No signs of infection    Sensation: grossly intact to light touch    Intake Outcome Measure for FOTO shoulder Survey  Therapist reviewed FOTO scores for Jess Gresham on 8/14/2024.  FOTO report- see Media section or FOTO account episode details.  Intake Score : 27%    TREATMENT   Treatment Time In: 9:27  Treatment Time Out: 9:50  Total Treatment time separate from Evaluation: 23 minutes    Jess received therapeutic exercises to develop strength, ROM, and flexibility for 23 minutes including:  Arm hang  Wrist AROM flex/ext  AA sh ER in supine (scap plane) 1# dowel [id number] (pain free)  Shoulder isometrics (flex,  abd, IR, ER) 10x5 sec ea (pain free)    Reviewed precautions: no AROM, no WBing into L UE or lifting with L UE    Pt to apply ice to L shoulder at home    Home Exercises and Patient Education Provided    Education provided:   - role of PT  -perform HEP in pain free ROM  Pt gave verbal understanding to all education provided     Written Home Exercises Provided: yes.  Exercises were reviewed and Jess was able to demonstrate them prior to the end of the session.  Jess demonstrated good  understanding of the education provided.     See EMR under Patient Instructions for exercises provided 8/14/24.    Assessment   Jess is a 64 y.o. female referred to outpatient Physical Therapy with a medical diagnosis of Rotator cuff tear, non-traumatic, left [M75.102], Biceps tendinopathy, left . Pt presents with L shoulder pain, decreased ROM, decreased function of L UE.  Will benefit from PT following Type II RCR protocol for Wilbur Pa MD    Pt prognosis is Good.   Pt will benefit from skilled outpatient Physical Therapy to address the deficits stated above and in the chart below, provide pt/family education, and to maximize pt's level of independence.     Plan of care discussed with patient: Yes  Pt's spiritual, cultural and educational needs considered and patient is agreeable to the plan of care and goals as stated below:     Anticipated Barriers for therapy: dependent on transportation    Medical Necessity is demonstrated by the following  History  Co-morbidities and personal factors that may impact the plan of care [x] LOW: no personal factors / co-morbidities  [] MODERATE: 1-2 personal factors / co-morbidities  [] HIGH: 3+ personal factors / co-morbidities    Moderate / High Support Documentation:   Co-morbidities affecting plan of care: none    Personal Factors:   no deficits     Examination  Body Structures and Functions, activity limitations and participation restrictions that may impact the plan of care [] LOW:  addressing 1-2 elements  [] MODERATE: 3+ elements  [x] HIGH: 4+ elements (please support below)    Moderate / High Support Documentation: pain, decreased shoulder ROM, decreased function of L UE for bathing, dressing, chores, work tasks     Clinical Presentation [x] LOW: stable  [] MODERATE: Evolving  [] HIGH: Unstable     Decision Making/ Complexity Score: low       GOALS: Short Term Goals:  4 weeks (progressing, not met)  1.Report decreased    L shoulder    pain  <   / =  4  /10 at its worst  to increase tolerance for ADLs  2. Increased L shoulder PROM flexion to at least 150-160 deg for increased ease with ADLs.  3. Increased L shoulder PROM ER at 45 deg abd to at least 60 deg for increased ease with ADLs.  4. Pt will report ability to sleep through the night without L shoulder pain.  5. Pt to tolerate HEP to improve ROM and independence with ADL's      Long Term Goals: 8 weeks (progressing, not met)  1.Report decreased    L shoulder    pain  <   / =  2  /10  to increase tolerance for ADLs  2. Increased L shoulder AROM flexion to at least 150 deg for increased ease with ADLs.  3. Pt to tolerate HEP to improve ROM and independence with ADL's  4. Pt will increase L shoulder AROM ER 50-60 deg at 0 deg abduction.  Will add strength goals once appropriate per protocol.        Plan   Plan of care Certification: 8/14/2024 to 10/11/24.    Outpatient Physical Therapy 2 times weekly for 8 weeks to include the following interventions: Electrical Stimulation  , Manual Therapy, Moist Heat/ Ice, Neuromuscular Re-ed, Patient Education, Self Care, Therapeutic Activities, Therapeutic Exercise, and dry needling.     Patsy Mejia, PT

## 2024-08-19 NOTE — PROGRESS NOTES
"POST-OPERATIVE EXAMINATION    64 y.o. Female who returns for follow after surgery. She is 2 weeks s/p:    Procedures Performed: 8/8/24  1. Left shoulder Arthroscopic rotator cuff repair CPT - 70338     2. Left shoulder Open Biceps Tenodesis" - 71059     3. Left shoulder Arthroscopic extensive debridement CPT - 45041     4. Left shoulder Arthroscopic subacromial decompression and bursectomy CPT - 72397    She is doing well without any issues.       PHYSICAL EXAMINATION:  [facility] 02/01/2013   General: Well-developed well-nourished 64 y.o. female in no acute distress   Cardiovascular: Regular rhythm   Lungs: No labored breathing or wheezing appreciated   Neuro: Alert and oriented ×3   Psychiatric: well oriented to person, place and time, demonstrates normal mood and affect   Skin: No rashes, lesions or ulcers, normal temperature, turgor, and texture on involved extremity    ORTHOPEDIC EXAM:  Normal post-operative swelling  Normal post-operative scarring  Strength: Grossly intact  ROM: as expected  Tests: none today    ASSESSMENT:      ICD-10-CM ICD-9-CM   1. S/P left rotator cuff repair  Z98.890 V45.89         PLAN:       Sutures removed today  Continue physical therapy  RTC in 1 month with Soren Bravo PA-C            "

## 2024-08-21 ENCOUNTER — OFFICE VISIT (OUTPATIENT)
Dept: SPORTS MEDICINE | Facility: CLINIC | Age: 64
End: 2024-08-21
Payer: COMMERCIAL

## 2024-08-21 VITALS
BODY MASS INDEX: 24 KG/M2 | SYSTOLIC BLOOD PRESSURE: 124 MMHG | HEART RATE: 88 BPM | DIASTOLIC BLOOD PRESSURE: 86 MMHG | WEIGHT: 122.25 LBS | HEIGHT: 60 IN

## 2024-08-21 DIAGNOSIS — Z98.890 S/P LEFT ROTATOR CUFF REPAIR: Primary | ICD-10-CM

## 2024-08-21 PROCEDURE — 3079F DIAST BP 80-89 MM HG: CPT | Mod: CPTII,S$GLB,, | Performed by: ORTHOPAEDIC SURGERY

## 2024-08-21 PROCEDURE — 99999 PR PBB SHADOW E&M-EST. PATIENT-LVL III: CPT | Mod: PBBFAC,,, | Performed by: ORTHOPAEDIC SURGERY

## 2024-08-21 PROCEDURE — 3074F SYST BP LT 130 MM HG: CPT | Mod: CPTII,S$GLB,, | Performed by: ORTHOPAEDIC SURGERY

## 2024-08-21 PROCEDURE — 3044F HG A1C LEVEL LT 7.0%: CPT | Mod: CPTII,S$GLB,, | Performed by: ORTHOPAEDIC SURGERY

## 2024-08-21 PROCEDURE — 1159F MED LIST DOCD IN RCRD: CPT | Mod: CPTII,S$GLB,, | Performed by: ORTHOPAEDIC SURGERY

## 2024-08-21 PROCEDURE — 99024 POSTOP FOLLOW-UP VISIT: CPT | Mod: S$GLB,,, | Performed by: ORTHOPAEDIC SURGERY

## 2024-08-22 ENCOUNTER — CLINICAL SUPPORT (OUTPATIENT)
Dept: REHABILITATION | Facility: HOSPITAL | Age: 64
End: 2024-08-22
Payer: COMMERCIAL

## 2024-08-22 DIAGNOSIS — R26.89 DECREASED FUNCTIONAL MOBILITY: ICD-10-CM

## 2024-08-22 DIAGNOSIS — M62.81 MUSCLE WEAKNESS: ICD-10-CM

## 2024-08-22 DIAGNOSIS — M25.60 DECREASED RANGE OF MOTION: Primary | ICD-10-CM

## 2024-08-22 PROCEDURE — 97110 THERAPEUTIC EXERCISES: CPT | Mod: PN,CQ

## 2024-08-22 NOTE — PROGRESS NOTES
"[facility]   Physical Therapy Treatment Note      Name: Jess Gresham  Clinic Number: [id number]    Therapy Diagnosis:   Encounter Diagnoses   Name Primary?    Decreased range of motion Yes    Muscle weakness     Decreased functional mobility      Physician: Wilbru Pa MD    Visit Date: 8/22/2024    Physician Orders: PT Eval and Treat; follow Type II RCR protocol for Wilbur Pa MD  Medical Diagnosis from Referral: Rotator cuff tear, non-traumatic, left [M75.102], Biceps tendinopathy, left   Evaluation Date: 8/14/2024  Authorization Period Expiration: 8/5/25  Plan of Care Expiration: 10/11/24  Visit # / Visits authorized: 1/ 15 +eval  FOTO: 27; goal: 63 by visit 21     PTA Visit #: 1/5       Time In: 4:03  Time Out: 4:41  Total Billable Time: 38 minutes     Precautions: Standard  DOS: 8/8/24:   1. Left shoulder Arthroscopic rotator cuff repair CPT - 29214     2. Left shoulder Open Biceps Tenodesis" - 54894     3. Left shoulder Arthroscopic extensive debridement CPT - 86663     4. Left shoulder Arthroscopic subacromial decompression and bursectomy CPT - 68085      Subjective     Patient reports: saw the MD and had her stitches removed, so far shoulder feeling pretty good. Some pectoral soreness due to the sling being placed more to her side but is not bad.  She was compliant with home exercise program.  Response to previous treatment: felt good  Functional change: too soon to tell    Pain: 0/10 at rest  Location: left shoulder      Objective      Objective Measures updated at progress report unless specified.     Treatment     Jess received the treatments listed below:      therapeutic exercises to develop strength, ROM, and flexibility for 38 minutes including:  PROM all planes per protocol - no pain, min tightness  Arm hang   Codman's up/down, L/R, cw/ccw x 10 each  Wrist AROM flex/ext  AA sh ER in supine (scap plane) 1# dowel [id number] (pain free)  Shoulder isometrics (flex, " abd, IR, ER) 10x5 sec ea (pain free)  Supine IR/ER/elbow extension perturbations in scapular plane     Reviewed precautions: no AROM, no WBing into L UE or lifting with L UE    manual therapy techniques: Joint mobilizations and Soft tissue Mobilization were applied to the: left shoulder for 00 minutes, including:  May add at future session    neuromuscular re-education activities to improve: Coordination, Kinesthetic, Proprioception, Posture, and Motor Control for 00 minutes. The following activities were included:  May add at future session    therapeutic activities to improve functional performance for 00  minutes, including:  May add at future session      Patient Education and Home Exercises       Education provided:   - proper sling positioning for comfort and alignment  - pain free ROM with exercises   - precaution awareness of no AROM, no WBing into L UE or lifting with L UE    Written Home Exercises Provided: Pt instructed to continue prior HEP. Exercises were reviewed and Jess was able to demonstrate them prior to the end of the session.  Jess demonstrated good  understanding of the education provided. See Electronic Medical Record under Patient Instructions for exercises provided during therapy sessions    Assessment     Patient tolerated progression of treatment well this date without symptom provocation. Reported good stretching with PROM into all planes of motion. Empty end feel in all planes due to protocol limitations, slight muscular tightness into flexion overhead. Occasional cues with isometric exercises to maintain proper scapular positioning. Will benefit from continued progression as per protocol allowance.    Jess Is progressing well towards her goals.   Patient prognosis is Good.   Rehab potential is good.     Patient will continue to benefit from skilled outpatient physical therapy to address the deficits listed in the problem list box on initial evaluation, provide pt/family education  and to maximize pt's level of independence in the home and community environment.     Patient's spiritual, cultural and educational needs considered and pt agreeable to plan of care and goals.     Anticipated barriers to physical therapy: currently not driving and dependent on transportation    Goals:   Short Term Goals:  4 weeks (progressing, not met)  1.Report decreased    L shoulder    pain  <   / =  4  /10 at its worst  to increase tolerance for ADLs  2. Increased L shoulder PROM flexion to at least 150-160 deg for increased ease with ADLs.  3. Increased L shoulder PROM ER at 45 deg abd to at least 60 deg for increased ease with ADLs.  4. Pt will report ability to sleep through the night without L shoulder pain.  5. Pt to tolerate HEP to improve ROM and independence with ADL's        Long Term Goals: 8 weeks (progressing, not met)  1.Report decreased    L shoulder    pain  <   / =  2  /10  to increase tolerance for ADLs  2. Increased L shoulder AROM flexion to at least 150 deg for increased ease with ADLs.  3. Pt to tolerate HEP to improve ROM and independence with ADL's  4. Pt will increase L shoulder AROM ER 50-60 deg at 0 deg abduction.  Will add strength goals once appropriate per protocol.    Plan   Plan of care Certification: 8/14/2024 to 10/11/24.     Continue with current POC toward established physical therapy goals. Progress within protocol as able.    Chanel Dc, PTA

## 2024-08-26 NOTE — PROGRESS NOTES
"[facility]   Physical Therapy Treatment Note      Name: Jess Gresham  Clinic Number: [id number]    Therapy Diagnosis:   Encounter Diagnoses   Name Primary?    Decreased range of motion Yes    Muscle weakness     Decreased functional mobility        Physician: Wilbur Pa MD    Visit Date: 2024    Physician Orders: PT Eval and Treat; follow Type II RCR protocol for Wilbur Pa MD  Medical Diagnosis from Referral: Rotator cuff tear, non-traumatic, left [M75.102], Biceps tendinopathy, left   Evaluation Date: 2024  Authorization Period Expiration: 25  Plan of Care Expiration: 10/11/24  Visit # / Visits authorized: 2/ 15 +eval  FOTO: 27; goal: 63 by visit 21     PTA Visit #:        Time In: 8:15  Time Out: 9:00  Total Billable Time: 38 minutes     Precautions: Standard  DOS: 24:   1. Left shoulder Arthroscopic rotator cuff repair CPT - 91622     2. Left shoulder Open Biceps Tenodesis" - 93415     3. Left shoulder Arthroscopic extensive debridement CPT - 67078     4. Left shoulder Arthroscopic subacromial decompression and bursectomy CPT - 50639      Subjective     Patient reports: she was rushing this morning so she has a little more pain this morning.  She was compliant with home exercise program.  Response to previous treatment: felt good  Functional change: too soon to tell    Pain: 3-4/10 at rest  Location: left shoulder      Objective      PROM L sh   flex: 125 deg  ER at 45 de deg  ER at 90 de deg    Treatment     Jess received the treatments listed below:      therapeutic exercises to develop strength, ROM, and flexibility for 38 minutes including:  PROM all planes per protocol - no pain, min tightness  Arm hang   Codman's up/down, L/R, cw/ccw x 10 each  Wrist AROM flex/ext  AA sh ER in supine (scap plane) 1# dowel [id number], then at 90 deg abd 10x10 sec (pain free)  Shoulder isometrics (flex, abd, IR, ER) 10x5 sec ea (pain free)  Supine " IR/ER/elbow extension perturbations in scapular plane     Reviewed precautions: no AROM, no WBing into L UE or lifting with L UE    manual therapy techniques: Joint mobilizations and Soft tissue Mobilization were applied to the: left shoulder for 00 minutes, including:  May add at future session    neuromuscular re-education activities to improve: Coordination, Kinesthetic, Proprioception, Posture, and Motor Control for 00 minutes. The following activities were included:  May add at future session    therapeutic activities to improve functional performance for 00  minutes, including:  May add at future session      Patient Education and Home Exercises       Education provided:   - proper sling positioning for comfort and alignment  - pain free ROM with exercises   - precaution awareness of no AROM, no WBing into L UE or lifting with L UE    Written Home Exercises Provided: Pt instructed to continue prior HEP. Exercises were reviewed and Jess was able to demonstrate them prior to the end of the session.  Jess demonstrated good  understanding of the education provided. See Electronic Medical Record under Patient Instructions for exercises provided during therapy sessions    Assessment     Patient has improved with PROM of L shoulder in all planes.  She is improving with increased isometric shoulder strength.  Will benefit from continued progression as per protocol allowance.    Jess Is progressing well towards her goals.   Patient prognosis is Good.   Rehab potential is good.     Patient will continue to benefit from skilled outpatient physical therapy to address the deficits listed in the problem list box on initial evaluation, provide pt/family education and to maximize pt's level of independence in the home and community environment.     Patient's spiritual, cultural and educational needs considered and pt agreeable to plan of care and goals.     Anticipated barriers to physical therapy: currently not driving  and dependent on transportation    Goals:   Short Term Goals:  4 weeks (progressing, not met)  1.Report decreased    L shoulder    pain  <   / =  4  /10 at its worst  to increase tolerance for ADLs  2. Increased L shoulder PROM flexion to at least 150-160 deg for increased ease with ADLs.  3. Increased L shoulder PROM ER at 45 deg abd to at least 60 deg for increased ease with ADLs.  4. Pt will report ability to sleep through the night without L shoulder pain.  5. Pt to tolerate HEP to improve ROM and independence with ADL's        Long Term Goals: 8 weeks (progressing, not met)  1.Report decreased    L shoulder    pain  <   / =  2  /10  to increase tolerance for ADLs  2. Increased L shoulder AROM flexion to at least 150 deg for increased ease with ADLs.  3. Pt to tolerate HEP to improve ROM and independence with ADL's  4. Pt will increase L shoulder AROM ER 50-60 deg at 0 deg abduction.  Will add strength goals once appropriate per protocol.    Plan   Plan of care Certification: 8/14/2024 to 10/11/24.     Continue with current POC toward established physical therapy goals. Progress within protocol as able.    Patsy Mejia, PT

## 2024-08-27 ENCOUNTER — CLINICAL SUPPORT (OUTPATIENT)
Dept: REHABILITATION | Facility: HOSPITAL | Age: 64
End: 2024-08-27
Payer: COMMERCIAL

## 2024-08-27 DIAGNOSIS — M62.81 MUSCLE WEAKNESS: ICD-10-CM

## 2024-08-27 DIAGNOSIS — R26.89 DECREASED FUNCTIONAL MOBILITY: ICD-10-CM

## 2024-08-27 DIAGNOSIS — M25.60 DECREASED RANGE OF MOTION: Primary | ICD-10-CM

## 2024-08-27 PROCEDURE — 97110 THERAPEUTIC EXERCISES: CPT | Mod: PN

## 2024-08-29 ENCOUNTER — CLINICAL SUPPORT (OUTPATIENT)
Dept: REHABILITATION | Facility: HOSPITAL | Age: 64
End: 2024-08-29
Payer: COMMERCIAL

## 2024-08-29 ENCOUNTER — TELEPHONE (OUTPATIENT)
Dept: GASTROENTEROLOGY | Facility: CLINIC | Age: 64
End: 2024-08-29
Payer: COMMERCIAL

## 2024-08-29 DIAGNOSIS — M25.60 DECREASED RANGE OF MOTION: Primary | ICD-10-CM

## 2024-08-29 DIAGNOSIS — M62.81 MUSCLE WEAKNESS: ICD-10-CM

## 2024-08-29 DIAGNOSIS — R26.89 DECREASED FUNCTIONAL MOBILITY: ICD-10-CM

## 2024-08-29 PROCEDURE — 97110 THERAPEUTIC EXERCISES: CPT | Mod: PN,CQ

## 2024-08-29 NOTE — TELEPHONE ENCOUNTER
Vmail left asking pt to return call to schedule scope, call back # provided. Trunityhart message sent to pt as well

## 2024-08-29 NOTE — PROGRESS NOTES
"[facility]   Physical Therapy Treatment Note      Name: Jess Gresham  Clinic Number: [id number]    Therapy Diagnosis:   Encounter Diagnoses   Name Primary?    Decreased range of motion Yes    Muscle weakness     Decreased functional mobility        Physician: Wilbur Pa MD    Visit Date: 2024    Physician Orders: PT Eval and Treat; follow Type II RCR protocol for Wilbur Pa MD  Medical Diagnosis from Referral: Rotator cuff tear, non-traumatic, left [M75.102], Biceps tendinopathy, left   Evaluation Date: 2024  Authorization Period Expiration: 25  Plan of Care Expiration: 10/11/24  Visit # / Visits authorized: 2/ 15 +eval  FOTO: 27; goal: 63 by visit 21     PTA Visit #:        Time In: 9:00  Time Out: 9:43  Total Billable Time: 38 minutes + 5 min cold pack     Precautions: Standard  DOS: 24:   1. Left shoulder Arthroscopic rotator cuff repair CPT - 60800     2. Left shoulder Open Biceps Tenodesis" - 13711     3. Left shoulder Arthroscopic extensive debridement CPT - 85922     4. Left shoulder Arthroscopic subacromial decompression and bursectomy CPT - 84861      Subjective     Patient reports: doing better with less pain at rest, some soreness the other day at work from handing in the sling while the other arm was working.  She was compliant with home exercise program.  Response to previous treatment: felt good  Functional change: too soon to tell    Pain: 0/10 at rest,some soreness while at work the other day  Location: left shoulder      Objective      PROM L sh   flex: 125 deg  ER at 45 de deg  ER at 90 de deg    Treatment     Jess received the treatments listed below:      therapeutic exercises to develop strength, ROM, and flexibility for 38 minutes including:  PROM all planes per protocol - no pain, min tightness  Arm hang   Codman's up/down, L/R, cw/ccw x 10 each  Wrist AROM flex/ext  AA sh ER in supine (scap plane) 1# dowel [id number], then " at 90 deg abd 10x10 sec (pain free)  Shoulder isometrics (flex, abd, IR, ER) 10x5 sec ea (pain free)  Supine IR/ER/elbow extension perturbations in scapular plane  Seated scap squeeze and posterior shoulder roll     Reviewed precautions: no AROM, no WBing into L UE or lifting with L UE    manual therapy techniques: Joint mobilizations and Soft tissue Mobilization were applied to the: left shoulder for 00 minutes, including:  May add at future session    neuromuscular re-education activities to improve: Coordination, Kinesthetic, Proprioception, Posture, and Motor Control for 00 minutes. The following activities were included:  May add at future session    therapeutic activities to improve functional performance for 00  minutes, including:  May add at future session    Cold pack x 5 minutes seated with arm resting on mat    Patient Education and Home Exercises       Education provided:   - proper sling positioning for comfort and alignment  - pain free ROM with exercises   - precaution awareness of no AROM, no WBing into L UE or lifting with L UE    Written Home Exercises Provided: Pt instructed to continue prior HEP. Exercises were reviewed and Jess was able to demonstrate them prior to the end of the session.  Jess demonstrated good  understanding of the education provided. See Electronic Medical Record under Patient Instructions for exercises provided during therapy sessions    Assessment     Patient progressing well with shoulder mobility of PROM in all planes. Greater tolerance to isometric activation without pain into the left shoulder. Good scapular motor control and awareness with all exercises. Will benefit from continued progression as per protocol allowance.    Jess Is progressing well towards her goals.   Patient prognosis is Good.   Rehab potential is good.     Patient will continue to benefit from skilled outpatient physical therapy to address the deficits listed in the problem list box on initial  evaluation, provide pt/family education and to maximize pt's level of independence in the home and community environment.     Patient's spiritual, cultural and educational needs considered and pt agreeable to plan of care and goals.     Anticipated barriers to physical therapy: currently not driving and dependent on transportation    Goals:   Short Term Goals:  4 weeks (progressing, not met)  1.Report decreased    L shoulder    pain  <   / =  4  /10 at its worst  to increase tolerance for ADLs  2. Increased L shoulder PROM flexion to at least 150-160 deg for increased ease with ADLs.  3. Increased L shoulder PROM ER at 45 deg abd to at least 60 deg for increased ease with ADLs.  4. Pt will report ability to sleep through the night without L shoulder pain.  5. Pt to tolerate HEP to improve ROM and independence with ADL's        Long Term Goals: 8 weeks (progressing, not met)  1.Report decreased    L shoulder    pain  <   / =  2  /10  to increase tolerance for ADLs  2. Increased L shoulder AROM flexion to at least 150 deg for increased ease with ADLs.  3. Pt to tolerate HEP to improve ROM and independence with ADL's  4. Pt will increase L shoulder AROM ER 50-60 deg at 0 deg abduction.  Will add strength goals once appropriate per protocol.    Plan   Plan of care Certification: 8/14/2024 to 10/11/24.     Continue with current POC toward established physical therapy goals. Progress within protocol as able.    Chanel Dc, PTA

## 2024-09-03 NOTE — PROGRESS NOTES
"[facility]   Physical Therapy Treatment Note      Name: Jess Gresham  Clinic Number: [id number]    Therapy Diagnosis:   Encounter Diagnoses   Name Primary?    Decreased range of motion Yes    Muscle weakness     Decreased functional mobility        Physician: Wilbur Pa MD    Visit Date: 2024    Physician Orders: PT Eval and Treat; follow Type II RCR protocol for Wilbur Pa MD  Medical Diagnosis from Referral: Rotator cuff tear, non-traumatic, left [M75.102], Biceps tendinopathy, left   Evaluation Date: 2024  Authorization Period Expiration: 25  Plan of Care Expiration: 10/11/24  Visit # / Visits authorized: 3/ 15 +eval  FOTO: 27; goal: 63 by visit 21     PTA Visit #:        Time In: 9:00  Time Out: 9:45  Total Billable Time: 38 minutes     Precautions: Standard  DOS: 24:   1. Left shoulder Arthroscopic rotator cuff repair CPT - 22476     2. Left shoulder Open Biceps Tenodesis" - 12087     3. Left shoulder Arthroscopic extensive debridement CPT - 48449     4. Left shoulder Arthroscopic subacromial decompression and bursectomy CPT - 07816      Subjective     Patient reports: doing better with less pain at rest, some soreness the other day at work from handing in the sling while the other arm was working.  She was compliant with home exercise program.  Response to previous treatment: felt good  Functional change: too soon to tell    Pain: 0/10 at rest,some soreness while at work the other day  Location: left shoulder      Objective      PROM L sh   flex: 130 deg  ER at 45 de deg  ER at 90 de deg    Treatment     Jess received the treatments listed below:      therapeutic exercises to develop strength, ROM, and flexibility for 30 minutes including:  PROM all planes per protocol - no pain, min tightness  AA sh flex 1# dowel 10x10 sec  AA sh ER 1# dowel at 0 deg abd and then at 90 deg abd 10x10 sec ea  Arm hang   Codman's up/down, L/R, cw/ccw x 10 " each  NP Wrist AROM flex/ext  Shoulder isometrics (flex, abd, IR, ER) 10x5 sec ea (pain free)  Supine IR/ER/elbow extension perturbations in scapular plane  posterior shoulder roll x10     Reviewed precautions: no AROM, no WBing into L UE or lifting with L UE    manual therapy techniques: Joint mobilizations and Soft tissue Mobilization were applied to the: left shoulder for 00 minutes, including:  May add at future session    neuromuscular re-education activities to improve: Coordination, Kinesthetic, Proprioception, Posture, and Motor Control for 8 minutes. The following activities were included:  Prone sh ext x10  Prone row x10    therapeutic activities to improve functional performance for 00  minutes, including:  May add at future session    NP Cold pack x 00 minutes seated with arm resting on mat    Patient Education and Home Exercises       Education provided:   - proper sling positioning for comfort and alignment  - pain free ROM with exercises   - precaution awareness of no AROM, no WBing into L UE or lifting with L UE    Written Home Exercises Provided: Pt instructed to continue prior HEP. Exercises were reviewed and Jess was able to demonstrate them prior to the end of the session.  Jess demonstrated good  understanding of the education provided. See Electronic Medical Record under Patient Instructions for exercises provided during therapy sessions    Assessment     Patient progressing well with shoulder mobility of PROM in all planes. Greater tolerance to isometric activation without pain into the left shoulder. Good scapular motor control and awareness with all exercises. Will benefit from continued progression as per protocol allowance.    Jess Is progressing well towards her goals.   Patient prognosis is Good.   Rehab potential is good.     Patient will continue to benefit from skilled outpatient physical therapy to address the deficits listed in the problem list box on initial evaluation,  provide pt/family education and to maximize pt's level of independence in the home and community environment.     Patient's spiritual, cultural and educational needs considered and pt agreeable to plan of care and goals.     Anticipated barriers to physical therapy: currently not driving and dependent on transportation    Goals:   Short Term Goals:  4 weeks (progressing, not met)  1.Report decreased    L shoulder    pain  <   / =  4  /10 at its worst  to increase tolerance for ADLs  2. Increased L shoulder PROM flexion to at least 150-160 deg for increased ease with ADLs.  3. Increased L shoulder PROM ER at 45 deg abd to at least 60 deg for increased ease with ADLs.  4. Pt will report ability to sleep through the night without L shoulder pain.  5. Pt to tolerate HEP to improve ROM and independence with ADL's        Long Term Goals: 8 weeks (progressing, not met)  1.Report decreased    L shoulder    pain  <   / =  2  /10  to increase tolerance for ADLs  2. Increased L shoulder AROM flexion to at least 150 deg for increased ease with ADLs.  3. Pt to tolerate HEP to improve ROM and independence with ADL's  4. Pt will increase L shoulder AROM ER 50-60 deg at 0 deg abduction.  Will add strength goals once appropriate per protocol.    Plan   Plan of care Certification: 8/14/2024 to 10/11/24.     Continue with current POC toward established physical therapy goals. Progress within protocol as able.    Patsy Mejia, PT

## 2024-09-04 ENCOUNTER — CLINICAL SUPPORT (OUTPATIENT)
Dept: REHABILITATION | Facility: HOSPITAL | Age: 64
End: 2024-09-04
Payer: COMMERCIAL

## 2024-09-04 DIAGNOSIS — M25.60 DECREASED RANGE OF MOTION: Primary | ICD-10-CM

## 2024-09-04 DIAGNOSIS — M62.81 MUSCLE WEAKNESS: ICD-10-CM

## 2024-09-04 DIAGNOSIS — R26.89 DECREASED FUNCTIONAL MOBILITY: ICD-10-CM

## 2024-09-04 PROCEDURE — 97110 THERAPEUTIC EXERCISES: CPT | Mod: PN

## 2024-09-04 PROCEDURE — 97112 NEUROMUSCULAR REEDUCATION: CPT | Mod: PN

## 2024-09-09 ENCOUNTER — CLINICAL SUPPORT (OUTPATIENT)
Dept: REHABILITATION | Facility: HOSPITAL | Age: 64
End: 2024-09-09
Payer: COMMERCIAL

## 2024-09-09 DIAGNOSIS — R26.89 DECREASED FUNCTIONAL MOBILITY: ICD-10-CM

## 2024-09-09 DIAGNOSIS — M25.60 DECREASED RANGE OF MOTION: Primary | ICD-10-CM

## 2024-09-09 DIAGNOSIS — M62.81 MUSCLE WEAKNESS: ICD-10-CM

## 2024-09-09 PROCEDURE — 97110 THERAPEUTIC EXERCISES: CPT | Mod: PN

## 2024-09-09 PROCEDURE — 97112 NEUROMUSCULAR REEDUCATION: CPT | Mod: PN

## 2024-09-09 NOTE — PROGRESS NOTES
"[facility]   Physical Therapy Treatment Note      Name: Jess Gresham  Clinic Number: [id number]    Therapy Diagnosis:   Encounter Diagnoses   Name Primary?    Decreased range of motion Yes    Muscle weakness     Decreased functional mobility        Physician: Wilbur Pa MD    Visit Date: 2024    Physician Orders: PT Eval and Treat; follow Type II RCR protocol for Wilbur Pa MD  Medical Diagnosis from Referral: Rotator cuff tear, non-traumatic, left [M75.102], Biceps tendinopathy, left   Evaluation Date: 2024  Authorization Period Expiration: 25  Plan of Care Expiration: 10/11/24  Visit # / Visits authorized: 4/ 15 +eval  FOTO: 27; goal: 63 by visit 21     PTA Visit #:        Time In: 8:20  Time Out: 9:00  Total Billable Time: 40 minutes     Precautions: Standard  DOS: 24:   1. Left shoulder Arthroscopic rotator cuff repair CPT - 87007     2. Left shoulder Open Biceps Tenodesis" - 26776     3. Left shoulder Arthroscopic extensive debridement CPT - 48405     4. Left shoulder Arthroscopic subacromial decompression and bursectomy CPT - 33482      Subjective     Patient reports: she is not having pain.  She was compliant with home exercise program.  Response to previous treatment: felt good  Functional change: too soon to tell    Pain: 0/10 at rest,some soreness while at work the other day  Location: left shoulder      Objective      PROM L sh   flex: 130 deg (135 deg post ex)  ER at 45 de deg  ER at 90 de deg    Treatment     Jess received the treatments listed below:      therapeutic exercises to develop strength, ROM, and flexibility for 30 minutes including:  PROM all planes per protocol - no pain, min tightness  AA sh flex 1# dowel 10x10 sec  AA sh ER 1# dowel at 0 deg abd and then at 90 deg abd 10x10 sec ea  Supine scap protraction 2x10  Arm hang   posterior shoulder roll x10     Reviewed precautions: no AROM, no WBing into L UE or lifting with " L UE    manual therapy techniques: Joint mobilizations and Soft tissue Mobilization were applied to the: left shoulder for 00 minutes, including:  May add at future session    neuromuscular re-education activities to improve: Coordination, Kinesthetic, Proprioception, Posture, and Motor Control for 10 minutes. The following activities were included:  Supine sh stabilization 90 deg flex  Prone sh ext 2x10, 1#  Prone row 2x10  Rows Y tubing  Sh IR walkout Y tubing x10  Sh ER walkout Y tubing x10    therapeutic activities to improve functional performance for 00  minutes, including:  May add at future session    NP Cold pack x 00 minutes seated with arm resting on mat    Patient Education and Home Exercises       Education provided:   - proper sling positioning for comfort and alignment  - pain free ROM with exercises   - precaution awareness of no AROM, no WBing into L UE or lifting with L UE    Written Home Exercises Provided: Pt instructed to continue prior HEP. Exercises were reviewed and Jess was able to demonstrate them prior to the end of the session.  Jess demonstrated good  understanding of the education provided. See Electronic Medical Record under Patient Instructions for exercises provided during therapy sessions    Assessment     Patient continues to improve with increased L shoulder PROM and postural strength.   Will benefit from continued progression as per protocol allowance.    Jess Is progressing well towards her goals.   Patient prognosis is Good.   Rehab potential is good.     Patient will continue to benefit from skilled outpatient physical therapy to address the deficits listed in the problem list box on initial evaluation, provide pt/family education and to maximize pt's level of independence in the home and community environment.     Patient's spiritual, cultural and educational needs considered and pt agreeable to plan of care and goals.     Anticipated barriers to physical therapy:  currently not driving and dependent on transportation    Goals:   Short Term Goals:  4 weeks   1.Report decreased    L shoulder    pain  <   / =  4  /10 at its worst  to increase tolerance for ADLs (met)  2. Increased L shoulder PROM flexion to at least 150-160 deg for increased ease with ADLs.(progressing, not met)  3. Increased L shoulder PROM ER at 45 deg abd to at least 60 deg for increased ease with ADLs.(progressing, not met)  4. Pt will report ability to sleep through the night without L shoulder pain. (Met)  5. Pt to tolerate HEP to improve ROM and independence with ADL's (met)        Long Term Goals: 8 weeks (progressing, not met)  1.Report decreased    L shoulder    pain  <   / =  2  /10  to increase tolerance for ADLs  2. Increased L shoulder AROM flexion to at least 150 deg for increased ease with ADLs.  3. Pt to tolerate HEP to improve ROM and independence with ADL's  4. Pt will increase L shoulder AROM ER 50-60 deg at 0 deg abduction.  Will add strength goals once appropriate per protocol.    Plan   Plan of care Certification: 8/14/2024 to 10/11/24.     Continue with current POC toward established physical therapy goals. Progress within protocol as able.    Patsy Mejia, PT

## 2024-09-16 ENCOUNTER — CLINICAL SUPPORT (OUTPATIENT)
Dept: REHABILITATION | Facility: HOSPITAL | Age: 64
End: 2024-09-16
Payer: COMMERCIAL

## 2024-09-16 DIAGNOSIS — M62.81 MUSCLE WEAKNESS: ICD-10-CM

## 2024-09-16 DIAGNOSIS — R26.89 DECREASED FUNCTIONAL MOBILITY: ICD-10-CM

## 2024-09-16 DIAGNOSIS — M25.60 DECREASED RANGE OF MOTION: Primary | ICD-10-CM

## 2024-09-16 PROCEDURE — 97112 NEUROMUSCULAR REEDUCATION: CPT | Mod: PN,CQ

## 2024-09-16 PROCEDURE — 97110 THERAPEUTIC EXERCISES: CPT | Mod: PN,CQ

## 2024-09-16 NOTE — PROGRESS NOTES
"[facility]   Physical Therapy Treatment Note      Name: Jess Gresham  Clinic Number: [id number]    Therapy Diagnosis:   Encounter Diagnoses   Name Primary?    Decreased range of motion Yes    Muscle weakness     Decreased functional mobility        Physician: Wilbur Pa MD    Visit Date: 2024    Physician Orders: PT Eval and Treat; follow Type II RCR protocol for Wilbur Pa MD  Medical Diagnosis from Referral: Rotator cuff tear, non-traumatic, left [M75.102], Biceps tendinopathy, left   Evaluation Date: 2024  Authorization Period Expiration: 25  Plan of Care Expiration: 10/11/24  Visit # / Visits authorized: 4/ 15 +eval  FOTO: 27; goal: 63 by visit 21     PTA Visit #:        Time In: 8:20  Time Out: 9:00  Total Billable Time: 40 minutes     Precautions: Standard  DOS: 24:   1. Left shoulder Arthroscopic rotator cuff repair CPT - 45874     2. Left shoulder Open Biceps Tenodesis" - 16831     3. Left shoulder Arthroscopic extensive debridement CPT - 69275     4. Left shoulder Arthroscopic subacromial decompression and bursectomy CPT - 77972      Subjective     Patient reports: doing a lot better using the arm a bit more and ready for her follow up later this week with the MD.   She was compliant with home exercise program.  Response to previous treatment: felt good  Functional change: able to wash face and hair easier.    Pain: 0/10 at rest,some soreness while at work the other day  Location: left shoulder      Objective      PROM L sh   flex: 130 deg (135 deg post ex)  ER at 45 de deg  ER at 90 de deg    Treatment     Jess received the treatments listed below:      therapeutic exercises to develop strength, ROM, and flexibility for 30 minutes including:  PROM all planes per protocol - no pain, min tightness  AA sh flex 1# dowel 10x10 sec  AA sh ER 1# dowel at 0 deg abd and then at 90 deg abd 10x10 sec ea  Supine scap protraction 2x10  Arm hang "   posterior shoulder roll x10     Reviewed precautions: no AROM, no WBing into L UE or lifting with L UE    manual therapy techniques: Joint mobilizations and Soft tissue Mobilization were applied to the: left shoulder for 00 minutes, including:  May add at future session    neuromuscular re-education activities to improve: Coordination, Kinesthetic, Proprioception, Posture, and Motor Control for 10 minutes. The following activities were included:  Supine sh stabilization 90 deg flex  Prone sh ext 2x10, 2# - using 2# at home  Prone row 2x10  Prone horiz abd (pain free ROM) x 10  Rows Y tubing 2 x 10  Sh IR walkout Y tubing x10  Sh ER walkout Y tubing x10    therapeutic activities to improve functional performance for 00  minutes, including:  May add at future session    NP Cold pack x 00 minutes seated with arm resting on mat    Patient Education and Home Exercises       Education provided:   - proper sling positioning for comfort and alignment  - pain free ROM with exercises   - precaution awareness of no AROM, no WBing into L UE or lifting with L UE    Written Home Exercises Provided: Pt instructed to continue prior HEP. Exercises were reviewed and Jess was able to demonstrate them prior to the end of the session.  Jess demonstrated good  understanding of the education provided. See Electronic Medical Record under Patient Instructions for exercises provided during therapy sessions    Assessment     Patient making continued slow improvements of left shoulder mobility, internal rotation remains most limited. Some joint stiffness at end range flexion present. Able to progress with prone horizontal abduction through comfortable range and AAROM shoulder ER in sidelying. Will benefit from continued progression as per protocol allowance.    Jess Is progressing well towards her goals.   Patient prognosis is Good.   Rehab potential is good.     Patient will continue to benefit from skilled outpatient physical therapy  to address the deficits listed in the problem list box on initial evaluation, provide pt/family education and to maximize pt's level of independence in the home and community environment.     Patient's spiritual, cultural and educational needs considered and pt agreeable to plan of care and goals.     Anticipated barriers to physical therapy: currently not driving and dependent on transportation    Goals:   Short Term Goals:  4 weeks   1.Report decreased    L shoulder    pain  <   / =  4  /10 at its worst  to increase tolerance for ADLs (met)  2. Increased L shoulder PROM flexion to at least 150-160 deg for increased ease with ADLs.(progressing, not met)  3. Increased L shoulder PROM ER at 45 deg abd to at least 60 deg for increased ease with ADLs.(progressing, not met)  4. Pt will report ability to sleep through the night without L shoulder pain. (Met)  5. Pt to tolerate HEP to improve ROM and independence with ADL's (met)        Long Term Goals: 8 weeks (progressing, not met)  1.Report decreased    L shoulder    pain  <   / =  2  /10  to increase tolerance for ADLs  2. Increased L shoulder AROM flexion to at least 150 deg for increased ease with ADLs.  3. Pt to tolerate HEP to improve ROM and independence with ADL's  4. Pt will increase L shoulder AROM ER 50-60 deg at 0 deg abduction.  Will add strength goals once appropriate per protocol.    Plan   Plan of care Certification: 8/14/2024 to 10/11/24.     Continue with current POC toward established physical therapy goals. Progress within protocol as able.    Chanel Dc, PTA

## 2024-09-18 ENCOUNTER — OFFICE VISIT (OUTPATIENT)
Dept: SPORTS MEDICINE | Facility: CLINIC | Age: 64
End: 2024-09-18
Payer: COMMERCIAL

## 2024-09-18 VITALS
HEART RATE: 88 BPM | SYSTOLIC BLOOD PRESSURE: 138 MMHG | HEIGHT: 60 IN | BODY MASS INDEX: 23.95 KG/M2 | WEIGHT: 122 LBS | DIASTOLIC BLOOD PRESSURE: 83 MMHG

## 2024-09-18 DIAGNOSIS — Z98.890 S/P LEFT ROTATOR CUFF REPAIR: Primary | ICD-10-CM

## 2024-09-18 PROCEDURE — 99999 PR PBB SHADOW E&M-EST. PATIENT-LVL III: CPT | Mod: PBBFAC,,, | Performed by: PHYSICIAN ASSISTANT

## 2024-09-18 PROCEDURE — 3079F DIAST BP 80-89 MM HG: CPT | Mod: CPTII,S$GLB,, | Performed by: PHYSICIAN ASSISTANT

## 2024-09-18 PROCEDURE — 99024 POSTOP FOLLOW-UP VISIT: CPT | Mod: S$GLB,,, | Performed by: PHYSICIAN ASSISTANT

## 2024-09-18 PROCEDURE — 3075F SYST BP GE 130 - 139MM HG: CPT | Mod: CPTII,S$GLB,, | Performed by: PHYSICIAN ASSISTANT

## 2024-09-18 PROCEDURE — 1160F RVW MEDS BY RX/DR IN RCRD: CPT | Mod: CPTII,S$GLB,, | Performed by: PHYSICIAN ASSISTANT

## 2024-09-18 PROCEDURE — 1159F MED LIST DOCD IN RCRD: CPT | Mod: CPTII,S$GLB,, | Performed by: PHYSICIAN ASSISTANT

## 2024-09-18 PROCEDURE — 3044F HG A1C LEVEL LT 7.0%: CPT | Mod: CPTII,S$GLB,, | Performed by: PHYSICIAN ASSISTANT

## 2024-09-18 NOTE — PROGRESS NOTES
"[facility]   Physical Therapy Treatment Note      Name: Jess Gresham  Clinic Number: [id number]    Therapy Diagnosis:   Encounter Diagnoses   Name Primary?    Decreased range of motion Yes    Muscle weakness     Decreased functional mobility          Physician: Wilbur Pa MD    Visit Date: 9/19/2024    Physician Orders: PT Eval and Treat; follow Type II RCR protocol for Wilbur Pa MD  Medical Diagnosis from Referral: Rotator cuff tear, non-traumatic, left [M75.102], Biceps tendinopathy, left   Evaluation Date: 8/14/2024  Authorization Period Expiration: 8/5/25  Plan of Care Expiration: 10/17/24  Visit # / Visits authorized: 5/ 15 +eval  FOTO: 2/3: 49; goal: 63 by visit 21     PTA Visit #: 1/5       Time In: 8:20  Time Out: 9:05  Total Billable Time: 45 minutes     Precautions: Standard  DOS: 8/8/24:   1. Left shoulder Arthroscopic rotator cuff repair CPT - 59481     2. Left shoulder Open Biceps Tenodesis" - 99275     3. Left shoulder Arthroscopic extensive debridement CPT - 79140     4. Left shoulder Arthroscopic subacromial decompression and bursectomy CPT - 83428      Subjective     Patient reports: she is feeling good.  The PA told her she can start weaning out of the sling.  She was compliant with home exercise program.  Response to previous treatment: felt good  Functional change: able to wash face and hair easier.    Pain: 0/10 at rest,some soreness while at work the other day  Location: left shoulder      Objective        Treatment     Jess received the treatments listed below:      therapeutic exercises to develop strength, ROM, and flexibility for 15 minutes including:  PROM all planes per protocol - no pain, min tightness  AA sh flex 1# dowel 10x10 sec  AA sh ER 1# dowel at 0 deg abd and then at 90 deg abd 10x10 sec ea  Supine scap protraction 2x10, 2#  posterior shoulder roll x10     manual therapy techniques: Joint mobilizations and Soft tissue Mobilization were " applied to the: left shoulder for 00 minutes, including:  May add at future session    neuromuscular re-education activities to improve: Coordination, Kinesthetic, Proprioception, Posture, and Motor Control for 30 minutes. The following activities were included:  Supine sh stabilization 90 deg flex  Supine horiz abd YTB 2x10  SL sh ER 2x10  SL sh flex 2x10  Prone sh ext 2x10, 2#   Prone row 2x10  Prone horiz abd (pain free ROM) 2x 10  Sh ext Y tubing x15, then with ER x15  Rows Y tubing 2 x 15 (increase to B tubing next visit)  Sh IR walkout Y tubing 2x10  Sh ER walkout Y tubing 2x10    therapeutic activities to improve functional performance for 00  minutes, including:  May add at future session    NP Cold pack x 00 minutes seated with arm resting on mat    Patient Education and Home Exercises       Education provided:   - pain free ROM with exercises   Pt gave verbal understanding to all education provided     Written Home Exercises Provided: yes. Exercises were reviewed and Jess was able to demonstrate them prior to the end of the session.  Jess demonstrated good  understanding of the education provided. See Electronic Medical Record under Patient Instructions for exercises provided during therapy sessions    Assessment     Patient continues to improve with increased L shoulder ROM and ability to tolerate increased periscap challenges. Will benefit from continued progression as per protocol allowance.    Jess Is progressing well towards her goals.   Patient prognosis is Good.   Rehab potential is good.     Patient will continue to benefit from skilled outpatient physical therapy to address the deficits listed in the problem list box on initial evaluation, provide pt/family education and to maximize pt's level of independence in the home and community environment.     Patient's spiritual, cultural and educational needs considered and pt agreeable to plan of care and goals.     Anticipated barriers to  physical therapy: currently not driving and dependent on transportation    Goals:   Short Term Goals:  4 weeks   1.Report decreased    L shoulder    pain  <   / =  4  /10 at its worst  to increase tolerance for ADLs (met)  2. Increased L shoulder PROM flexion to at least 150-160 deg for increased ease with ADLs.(progressing, not met)  3. Increased L shoulder PROM ER at 45 deg abd to at least 60 deg for increased ease with ADLs.(progressing, not met)  4. Pt will report ability to sleep through the night without L shoulder pain. (Met)  5. Pt to tolerate HEP to improve ROM and independence with ADL's (met)        Long Term Goals: 8 weeks (progressing, not met)  1.Report decreased    L shoulder    pain  <   / =  2  /10  to increase tolerance for ADLs  2. Increased L shoulder AROM flexion to at least 150 deg for increased ease with ADLs.  3. Pt to tolerate HEP to improve ROM and independence with ADL's  4. Pt will increase L shoulder AROM ER 50-60 deg at 0 deg abduction.  Will add strength goals once appropriate per protocol.    Plan   Plan of care Certification: 8/14/2024 to 10/11/24.     Continue with current POC toward established physical therapy goals. Progress within protocol as able.    Patsy Mejia, PT

## 2024-09-18 NOTE — PROGRESS NOTES
"POST-OPERATIVE EXAMINATION    64 y.o. Female who returns for follow after surgery. She is 6 weeks s/p:    Procedures Performed: 8/8/24  1. Left shoulder Arthroscopic rotator cuff repair CPT - 61075     2. Left shoulder Open Biceps Tenodesis" - 53097     3. Left shoulder Arthroscopic extensive debridement CPT - 22928     4. Left shoulder Arthroscopic subacromial decompression and bursectomy CPT - 38169    She is doing well without any issues.       PHYSICAL EXAMINATION:  /83   Pulse 88   Ht 5' (1.524 m)   Wt 55.3 kg (122 lb)   LMP 02/01/2013   BMI 23.83 kg/m²   General: Well-developed well-nourished 64 y.o. female in no acute distress   Cardiovascular: Regular rhythm   Lungs: No labored breathing or wheezing appreciated   Neuro: Alert and oriented ×3   Psychiatric: well oriented to person, place and time, demonstrates normal mood and affect   Skin: No rashes, lesions or ulcers, normal temperature, turgor, and texture on involved extremity    ORTHOPEDIC EXAM:  Normal post-operative swelling  Normal post-operative scarring  Strength: Grossly intact  ROM: FE- 140; ER/Abd- 60; IR/Abd- 60; ER/Add- 60    Tests: none today    ASSESSMENT:      ICD-10-CM ICD-9-CM   1. S/P left rotator cuff repair  Z98.890 V45.89           PLAN:       Wean from sling  Continue physical therapy  RTC in 2 months with MD Soren Mcduffie PA-C, [facility]          "

## 2024-09-19 ENCOUNTER — CLINICAL SUPPORT (OUTPATIENT)
Dept: REHABILITATION | Facility: HOSPITAL | Age: 64
End: 2024-09-19
Payer: COMMERCIAL

## 2024-09-19 DIAGNOSIS — M25.60 DECREASED RANGE OF MOTION: Primary | ICD-10-CM

## 2024-09-19 DIAGNOSIS — M62.81 MUSCLE WEAKNESS: ICD-10-CM

## 2024-09-19 DIAGNOSIS — R26.89 DECREASED FUNCTIONAL MOBILITY: ICD-10-CM

## 2024-09-19 PROCEDURE — 97112 NEUROMUSCULAR REEDUCATION: CPT | Mod: PN

## 2024-09-19 PROCEDURE — 97110 THERAPEUTIC EXERCISES: CPT | Mod: PN

## 2024-09-23 ENCOUNTER — CLINICAL SUPPORT (OUTPATIENT)
Dept: REHABILITATION | Facility: HOSPITAL | Age: 64
End: 2024-09-23
Payer: COMMERCIAL

## 2024-09-23 DIAGNOSIS — M25.60 DECREASED RANGE OF MOTION: Primary | ICD-10-CM

## 2024-09-23 DIAGNOSIS — R26.89 DECREASED FUNCTIONAL MOBILITY: ICD-10-CM

## 2024-09-23 DIAGNOSIS — M62.81 MUSCLE WEAKNESS: ICD-10-CM

## 2024-09-23 PROCEDURE — 97110 THERAPEUTIC EXERCISES: CPT | Mod: PN,CQ

## 2024-09-23 PROCEDURE — 97112 NEUROMUSCULAR REEDUCATION: CPT | Mod: PN,CQ

## 2024-09-23 NOTE — PROGRESS NOTES
"[facility]   Physical Therapy Treatment Note      Name: Jess Gresham  Clinic Number: [id number]    Therapy Diagnosis:   Encounter Diagnoses   Name Primary?    Decreased range of motion Yes    Muscle weakness     Decreased functional mobility          Physician: Wilbur Pa MD    Visit Date: 9/23/2024    Physician Orders: PT Eval and Treat; follow Type II RCR protocol for Wilbur Pa MD  Medical Diagnosis from Referral: Rotator cuff tear, non-traumatic, left [M75.102], Biceps tendinopathy, left   Evaluation Date: 8/14/2024  Authorization Period Expiration: 8/5/25  Plan of Care Expiration: 10/17/24  Visit # / Visits authorized: 8/ 15 +eval  FOTO: 2/3: 49; goal: 64 by visit 21     PTA Visit #: 1/5       Time In: 8:17  Time Out: 9:07  Total Billable Time: 50 minutes     Precautions: Standard  DOS: 8/8/24:   1. Left shoulder Arthroscopic rotator cuff repair CPT - 40098     2. Left shoulder Open Biceps Tenodesis" - 96270     3. Left shoulder Arthroscopic extensive debridement CPT - 87178     4. Left shoulder Arthroscopic subacromial decompression and bursectomy CPT - 24229      Subjective     Patient reports: doing pretty well, some aching to the shoulder and pulling along the incision. Did not wear the sling at all yesterday.  She was compliant with home exercise program.  Response to previous treatment: felt good, min soreness  Functional change: using the left arm more but still being cautious to avoid carrying or lifting things    Pain: 0/10 at rest,some soreness while at work the other day  Location: left shoulder      Objective        Treatment     Jess received the treatments listed below:      therapeutic exercises to develop strength, ROM, and flexibility for 15 minutes including:  PROM all planes per protocol - no pain, min tightness  AA sh flex 1# dowel 10x10 sec  AA sh ER 1# dowel at 0 deg abd and then at 90 deg abd 10x10 sec ea  Supine scap protraction 2x10, " 2#  posterior shoulder roll x10     manual therapy techniques: Joint mobilizations and Soft tissue Mobilization were applied to the: left shoulder for 05 minutes, including:  Scar tissue mobilization over anterior incision, education on self scar mobilization for home    neuromuscular re-education activities to improve: Coordination, Kinesthetic, Proprioception, Posture, and Motor Control for 30 minutes. The following activities were included:  Supine sh stabilization 90 deg flex  Supine horiz abd YTB 2x10  SL sh ER 2x10  SL sh flex 2x10  SL sh horiz abd 2x10  Prone sh ext 2x10, 2#   Prone row 2x10 2#  Prone horiz abd (pain free ROM) 2x 10  Sh ext Y tubing x15, then with ER x15  Rows Y tubing x 15, B tubing x 15   Sh IR walkout Y tubing 2x10  Sh ER walkout Y tubing 2x10    therapeutic activities to improve functional performance for 00  minutes, including:  May add at future session    NP Cold pack x 00 minutes seated with arm resting on mat    Patient Education and Home Exercises       Education provided:   - pain free ROM with exercises   Pt gave verbal understanding to all education provided     Written Home Exercises Provided: yes. Exercises were reviewed and Jess was able to demonstrate them prior to the end of the session.  Jess demonstrated good  understanding of the education provided. See Electronic Medical Record under Patient Instructions for exercises provided during therapy sessions    Assessment     Patient making progress of L shoulder ROM and mobility, overhead limitation from pain along scar tissue at the anterior shoulder. Improving periscapular motor control and awareness with all sidelying, prone, and standing exercises. Will benefit from continued progression as per protocol allowance.    Jess Is progressing well towards her goals.   Patient prognosis is Good.   Rehab potential is good.     Patient will continue to benefit from skilled outpatient physical therapy to address the deficits  listed in the problem list box on initial evaluation, provide pt/family education and to maximize pt's level of independence in the home and community environment.     Patient's spiritual, cultural and educational needs considered and pt agreeable to plan of care and goals.     Anticipated barriers to physical therapy: currently not driving and dependent on transportation    Goals:   Short Term Goals:  4 weeks   1.Report decreased    L shoulder    pain  <   / =  4  /10 at its worst  to increase tolerance for ADLs (met)  2. Increased L shoulder PROM flexion to at least 150-160 deg for increased ease with ADLs.(progressing, not met)  3. Increased L shoulder PROM ER at 45 deg abd to at least 60 deg for increased ease with ADLs.(progressing, not met)  4. Pt will report ability to sleep through the night without L shoulder pain. (Met)  5. Pt to tolerate HEP to improve ROM and independence with ADL's (met)        Long Term Goals: 8 weeks (progressing, not met)  1.Report decreased    L shoulder    pain  <   / =  2  /10  to increase tolerance for ADLs  2. Increased L shoulder AROM flexion to at least 150 deg for increased ease with ADLs.  3. Pt to tolerate HEP to improve ROM and independence with ADL's  4. Pt will increase L shoulder AROM ER 50-60 deg at 0 deg abduction.  Will add strength goals once appropriate per protocol.    Plan   Plan of care Certification: 8/14/2024 to 10/11/24.     Continue with current POC toward established physical therapy goals. Progress within protocol as able.    Chanel Dc, PTA

## 2024-09-25 ENCOUNTER — TELEPHONE (OUTPATIENT)
Dept: GASTROENTEROLOGY | Facility: CLINIC | Age: 64
End: 2024-09-25
Payer: COMMERCIAL

## 2024-09-25 NOTE — PROGRESS NOTES
"[facility]   Physical Therapy Treatment Note      Name: Jess Gresham  Clinic Number: [id number]    Therapy Diagnosis:   Encounter Diagnoses   Name Primary?    Decreased range of motion Yes    Muscle weakness     Decreased functional mobility            Physician: Wilbur Pa MD    Visit Date: 9/26/2024    Physician Orders: PT Eval and Treat; follow Type II RCR protocol for Wilbur Pa MD  Medical Diagnosis from Referral: Rotator cuff tear, non-traumatic, left [M75.102], Biceps tendinopathy, left   Evaluation Date: 8/14/2024  Authorization Period Expiration: 8/5/25  Plan of Care Expiration: 10/17/24  Visit # / Visits authorized: 9/ 15 +eval  FOTO: 2/3: 49; goal: 64 by visit 21     PTA Visit #: 1/5       Time In: 8:15  Time Out: 9:05  Total Billable Time: 47 minutes     Precautions: Standard  DOS: 8/8/24:   1. Left shoulder Arthroscopic rotator cuff repair CPT - 84424     2. Left shoulder Open Biceps Tenodesis" - 23146     3. Left shoulder Arthroscopic extensive debridement CPT - 39866     4. Left shoulder Arthroscopic subacromial decompression and bursectomy CPT - 82159      Subjective     Patient reports: she had some increased L shoulder pain after last session, but it subsided the next morning  She was compliant with home exercise program.  Response to previous treatment: felt good, min soreness  Functional change: using the left arm more but still being cautious to avoid carrying or lifting things    Pain: 0/10 at rest,some soreness while at work the other day  Location: left shoulder      Objective        Treatment     Jess received the treatments listed below:      therapeutic exercises to develop strength, ROM, and flexibility for 15 minutes including:  PROM all planes per protocol - no pain, min tightness  AA sh flex 1# dowel 10x10 sec  AA sh ER 1# dowel at 0 deg abd and then at 90 deg abd 10x10 sec ea  Supine scap protraction 2x10, 2#  posterior shoulder roll x10  AA sh " ext dowel x10, 5 sec hold  AA sh IR dowel x10, 5 sec hold     manual therapy techniques: Joint mobilizations and Soft tissue Mobilization were applied to the: left shoulder for 2 minutes, including:  NP Scar tissue mobilization over anterior incision, education on self scar mobilization for home  Gentle L GH jt inf and post mobs (grd II)    neuromuscular re-education activities to improve: Coordination, Kinesthetic, Proprioception, Posture, and Motor Control for 30 minutes. The following activities were included:  Supine sh stabilization 90 deg flex  Supine horiz abd YTB 2x15  SL sh ER 2x10, 1#  SL sh flex 2x10, 1#  Prone sh ext 2x15, 2#   Prone row 2x15 2#  Prone horiz abd (pain free ROM) 2x 15  Sh flex wall slides with cues to avoid sh hike x10  AROM scaption to 90 deg x10 (mirror for visual cues and mc to avoid UT compensation)  Sh ext Y tubing x15, then with ER x15  Rows Y tubing x 15, B tubing x 15       therapeutic activities to improve functional performance for 00  minutes, including:  May add at future session    NP Cold pack x 00 minutes seated with arm resting on mat    Patient Education and Home Exercises       Education provided:   - pain free ROM with exercises   Pt gave verbal understanding to all education provided     Written Home Exercises Provided: yes. Exercises were reviewed and Jess was able to demonstrate them prior to the end of the session.  Jess demonstrated good  understanding of the education provided. See Electronic Medical Record under Patient Instructions for exercises provided during therapy sessions    Assessment     Patient continues to improve with increased L shoulder ROM and strength.  She was able to progress periscap exercises and add gentle shoulder IR today. Will benefit from continued progression as per protocol.    Jess Is progressing well towards her goals.   Patient prognosis is Good.   Rehab potential is good.     Patient will continue to benefit from skilled  outpatient physical therapy to address the deficits listed in the problem list box on initial evaluation, provide pt/family education and to maximize pt's level of independence in the home and community environment.     Patient's spiritual, cultural and educational needs considered and pt agreeable to plan of care and goals.     Anticipated barriers to physical therapy: currently not driving and dependent on transportation    Goals:   Short Term Goals:  4 weeks   1.Report decreased    L shoulder    pain  <   / =  4  /10 at its worst  to increase tolerance for ADLs (met)  2. Increased L shoulder PROM flexion to at least 150-160 deg for increased ease with ADLs.(progressing, not met)  3. Increased L shoulder PROM ER at 45 deg abd to at least 60 deg for increased ease with ADLs.(progressing, not met)  4. Pt will report ability to sleep through the night without L shoulder pain. (Met)  5. Pt to tolerate HEP to improve ROM and independence with ADL's (met)        Long Term Goals: 8 weeks (progressing, not met)  1.Report decreased    L shoulder    pain  <   / =  2  /10  to increase tolerance for ADLs  2. Increased L shoulder AROM flexion to at least 150 deg for increased ease with ADLs.  3. Pt to tolerate HEP to improve ROM and independence with ADL's  4. Pt will increase L shoulder AROM ER 50-60 deg at 0 deg abduction.  Will add strength goals once appropriate per protocol.    Plan   Plan of care Certification: 8/14/2024 to 10/11/24.     Continue with current POC toward established physical therapy goals. Progress within protocol as able.    Patsy Mejia, PT

## 2024-09-26 ENCOUNTER — CLINICAL SUPPORT (OUTPATIENT)
Dept: REHABILITATION | Facility: HOSPITAL | Age: 64
End: 2024-09-26
Payer: COMMERCIAL

## 2024-09-26 DIAGNOSIS — R26.89 DECREASED FUNCTIONAL MOBILITY: ICD-10-CM

## 2024-09-26 DIAGNOSIS — M25.60 DECREASED RANGE OF MOTION: Primary | ICD-10-CM

## 2024-09-26 DIAGNOSIS — M62.81 MUSCLE WEAKNESS: ICD-10-CM

## 2024-09-26 PROCEDURE — 97112 NEUROMUSCULAR REEDUCATION: CPT | Mod: PN

## 2024-09-26 PROCEDURE — 97110 THERAPEUTIC EXERCISES: CPT | Mod: PN

## 2024-10-02 ENCOUNTER — PATIENT MESSAGE (OUTPATIENT)
Dept: SPORTS MEDICINE | Facility: CLINIC | Age: 64
End: 2024-10-02
Payer: COMMERCIAL

## 2024-10-02 NOTE — PROGRESS NOTES
"[facility]   Physical Therapy Treatment Note      Name: Jess Gresham  Clinic Number: [id number]    Therapy Diagnosis:   Encounter Diagnoses   Name Primary?    Decreased range of motion Yes    Muscle weakness     Decreased functional mobility        Physician: Wilbur Pa MD    Visit Date: 10/3/2024    Physician Orders: PT Eval and Treat; follow Type II RCR protocol for Wilbur Pa MD  Medical Diagnosis from Referral: Rotator cuff tear, non-traumatic, left [M75.102], Biceps tendinopathy, left   Evaluation Date: 8/14/2024  Authorization Period Expiration: 8/5/25  Plan of Care Expiration: 10/17/24  Visit # / Visits authorized: 10/ 15 +eval  FOTO: 2/3: 49; goal: 64 by visit 21     PTA Visit #: 1/5       Time In: 10:35  Time Out: 11:15  Total Billable Time: 40 minutes     Precautions: Standard  DOS: 8/8/24:   1. Left shoulder Arthroscopic rotator cuff repair CPT - 30011     2. Left shoulder Open Biceps Tenodesis" - 25942     3. Left shoulder Arthroscopic extensive debridement CPT - 17621     4. Left shoulder Arthroscopic subacromial decompression and bursectomy CPT - 81152      Subjective     Patient reports: she is doing well.  Her left shoulder motion is improving.  She was compliant with home exercise program.  Response to previous treatment: felt good, min soreness  Functional change: using the left arm more but still being cautious to avoid carrying or lifting things    Pain: 0/10 at rest,some soreness while at work the other day  Location: left shoulder      Objective        Treatment     Jess received the treatments listed below:      therapeutic exercises to develop strength, ROM, and flexibility for 13 minutes including:  PROM all planes   AA sh flex 1# dowel 10x10 sec  AA sh ER 1# dowel at 0 deg abd and then at 90 deg abd 10x10 sec ea  Supine scap protraction 2x10, 3#  posterior shoulder roll x10  AA sh IR to center of back x10, 5 sec hold  AA sh IR towel x10, 5 sec hold   "   manual therapy techniques: Joint mobilizations and Soft tissue Mobilization were applied to the: left shoulder for 2 minutes, including:  NP Scar tissue mobilization over anterior incision, education on self scar mobilization for home  Gentle L GH jt inf and post mobs (grd II)    neuromuscular re-education activities to improve: Coordination, Kinesthetic, Proprioception, Posture, and Motor Control for 25 minutes. The following activities were included:  Supine sh stabilization 90 deg flex  Supine ABCs 1#  Supine horiz abd YTB 2x15  SL sh ER 2x10, 2#  SL sh flex 2x10, 1# (full ROM)  SL sh abd 2x10 1#  Prone sh ext 2x15, 2#   Prone row 2x15 3#  Prone horiz abd (pain free ROM) 2x 15  Sh flex wall slides with cues to avoid sh hike x10  AROM scaption to 90 deg x10 (mirror for visual cues and mc to avoid UT compensation)  Sh ext Blue tubing x15, then with ER x15  Rows Blue tubing 2x 15      therapeutic activities to improve functional performance for 00  minutes, including:  May add at future session    NP Cold pack x 00 minutes seated with arm resting on mat    Patient Education and Home Exercises       Education provided:   - pain free ROM with exercises   Pt gave verbal understanding to all education provided     Written Home Exercises Provided: yes. Exercises were reviewed and Jess was able to demonstrate them prior to the end of the session.  Jess demonstrated good  understanding of the education provided. See Electronic Medical Record under Patient Instructions for exercises provided during therapy sessions    Assessment     Patient continues to improve with increased L shoulder ROM and strength.  She will continue to benefit from scap stabilization to prevent shoulder hike with overhead motions. Will benefit from continued progression as per protocol.    Jess Is progressing well towards her goals.   Patient prognosis is Good.   Rehab potential is good.     Patient will continue to benefit from skilled  outpatient physical therapy to address the deficits listed in the problem list box on initial evaluation, provide pt/family education and to maximize pt's level of independence in the home and community environment.     Patient's spiritual, cultural and educational needs considered and pt agreeable to plan of care and goals.     Anticipated barriers to physical therapy: currently not driving and dependent on transportation    Goals:   Short Term Goals:  4 weeks   1.Report decreased    L shoulder    pain  <   / =  4  /10 at its worst  to increase tolerance for ADLs (met)  2. Increased L shoulder PROM flexion to at least 150-160 deg for increased ease with ADLs.(progressing, not met)  3. Increased L shoulder PROM ER at 45 deg abd to at least 60 deg for increased ease with ADLs.(progressing, not met)  4. Pt will report ability to sleep through the night without L shoulder pain. (Met)  5. Pt to tolerate HEP to improve ROM and independence with ADL's (met)        Long Term Goals: 8 weeks (progressing, not met)  1.Report decreased    L shoulder    pain  <   / =  2  /10  to increase tolerance for ADLs  2. Increased L shoulder AROM flexion to at least 150 deg for increased ease with ADLs.  3. Pt to tolerate HEP to improve ROM and independence with ADL's  4. Pt will increase L shoulder AROM ER 50-60 deg at 0 deg abduction.  Will add strength goals once appropriate per protocol.    Plan   Plan of care Certification: 8/14/2024 to 10/11/24.     Continue with current POC toward established physical therapy goals. Progress within protocol as able.    Patsy Mejia, PT

## 2024-10-03 ENCOUNTER — CLINICAL SUPPORT (OUTPATIENT)
Dept: REHABILITATION | Facility: HOSPITAL | Age: 64
End: 2024-10-03
Payer: COMMERCIAL

## 2024-10-03 DIAGNOSIS — M25.60 DECREASED RANGE OF MOTION: Primary | ICD-10-CM

## 2024-10-03 DIAGNOSIS — M62.81 MUSCLE WEAKNESS: ICD-10-CM

## 2024-10-03 DIAGNOSIS — R26.89 DECREASED FUNCTIONAL MOBILITY: ICD-10-CM

## 2024-10-03 PROCEDURE — 97110 THERAPEUTIC EXERCISES: CPT | Mod: PN

## 2024-10-03 PROCEDURE — 97112 NEUROMUSCULAR REEDUCATION: CPT | Mod: PN

## 2024-10-09 NOTE — PROGRESS NOTES
"[facility]   Physical Therapy Treatment Note      Name: Jess Gresham  Clinic Number: [id number]    Therapy Diagnosis:   Encounter Diagnoses   Name Primary?    Decreased range of motion Yes    Muscle weakness     Decreased functional mobility      Physician: Wilbur Pa MD    Visit Date: 10/10/2024    Physician Orders: PT Eval and Treat; follow Type II RCR protocol for Wilbur Pa MD  Medical Diagnosis from Referral: Rotator cuff tear, non-traumatic, left [M75.102], Biceps tendinopathy, left   Evaluation Date: 8/14/2024  Authorization Period Expiration: 8/5/25  Plan of Care Expiration: 10/17/24  Visit # / Visits authorized: 11/ 15 +eval  FOTO: 2/3: 49; goal: 63 by visit 21     PTA Visit #: 1/5       Time In: 10:35  Time Out: 11:15  Total Billable Time: 40 minutes     Precautions: Standard  DOS: 8/8/24:   1. Left shoulder Arthroscopic rotator cuff repair CPT - 09209     2. Left shoulder Open Biceps Tenodesis" - 44709     3. Left shoulder Arthroscopic extensive debridement CPT - 15062     4. Left shoulder Arthroscopic subacromial decompression and bursectomy CPT - 21923      Subjective     Patient reports: feeling pretty good overall but having some stiffness into the left sided neck. Shoulder still having some stiffness   She was compliant with home exercise program.  Response to previous treatment: felt good, min soreness  Functional change: using the left arm more but still being cautious to avoid carrying or lifting things    Pain: 0/10 at rest,some soreness while at work the other day  Location: left shoulder      Objective        Treatment     Jess received the treatments listed below:      therapeutic exercises to develop strength, ROM, and flexibility for 13 minutes including:  PROM all planes   AA sh flex 1# dowel 10x10 sec  AA sh ER 1# dowel at 0 deg abd and then at 90 deg abd 10x10 sec ea  Supine scap protraction 2x10, 3# (pt doing 5# at home)  posterior shoulder roll " x10  AA sh IR to center of back x10, 5 sec hold  AA sh IR towel x10, 5 sec hold     manual therapy techniques: Joint mobilizations and Soft tissue Mobilization were applied to the: left shoulder for 2 minutes, including:  NP Scar tissue mobilization over anterior incision, education on self scar mobilization for home  Gentle L GH jt inf and post mobs (grd II)    neuromuscular re-education activities to improve: Coordination, Kinesthetic, Proprioception, Posture, and Motor Control for 25 minutes. The following activities were included:  Supine sh stabilization 90 deg flex  Supine ABCs 1#  Supine horiz abd YTB 2x15  SL sh ER 2x10, 2#  SL sh flex 2x10, 1# (full ROM)  SL sh abd 2x10 1#  Prone sh ext 2x15, 2#   Prone row 2x15 3#  Prone horiz abd (pain free ROM) 1#, 2 x 10 (pt using 2# at home)  Sh flex wall slides with cues to avoid sh hike x10 - more serratus activation cues today  AROM scaption to 90 deg x10 (mirror for visual cues and mc to avoid UT compensation)  NP--Sh ext Blue tubing x15, then with ER x15  NP--Rows Blue tubing 2x 15      therapeutic activities to improve functional performance for 00  minutes, including:  May add at future session    NP Cold pack x 00 minutes seated with arm resting on mat    Patient Education and Home Exercises       Education provided:   - pain free ROM with exercises   Pt gave verbal understanding to all education provided     Written Home Exercises Provided: yes. Exercises were reviewed and Jess was able to demonstrate them prior to the end of the session.  Jess demonstrated good  understanding of the education provided. See Electronic Medical Record under Patient Instructions for exercises provided during therapy sessions    Assessment     Patient improving with shoulder mobility and strength, some stiffness continues into internal rotation and end range abduction and flexion. Progressing well with mobility and strength throughout the shoulder complex. Able to progress  resistance with a few exercises with good tolerance. Discussed increasing AROM with standing shoulder scaption and wall slides as able. Patient able to perform resisted rows and extensions at home as part of HEP today. Will benefit from continued progression as per protocol.    Jess Is progressing well towards her goals.   Patient prognosis is Good.   Rehab potential is good.     Patient will continue to benefit from skilled outpatient physical therapy to address the deficits listed in the problem list box on initial evaluation, provide pt/family education and to maximize pt's level of independence in the home and community environment.     Patient's spiritual, cultural and educational needs considered and pt agreeable to plan of care and goals.     Anticipated barriers to physical therapy: currently not driving and dependent on transportation    Goals:   Short Term Goals:  4 weeks   1.Report decreased    L shoulder    pain  <   / =  4  /10 at its worst  to increase tolerance for ADLs (met)  2. Increased L shoulder PROM flexion to at least 150-160 deg for increased ease with ADLs.(progressing, not met)  3. Increased L shoulder PROM ER at 45 deg abd to at least 60 deg for increased ease with ADLs.(progressing, not met)  4. Pt will report ability to sleep through the night without L shoulder pain. (Met)  5. Pt to tolerate HEP to improve ROM and independence with ADL's (met)        Long Term Goals: 8 weeks (progressing, not met)  1.Report decreased    L shoulder    pain  <   / =  2  /10  to increase tolerance for ADLs  2. Increased L shoulder AROM flexion to at least 150 deg for increased ease with ADLs.  3. Pt to tolerate HEP to improve ROM and independence with ADL's  4. Pt will increase L shoulder AROM ER 50-60 deg at 0 deg abduction.  Will add strength goals once appropriate per protocol.    Plan   Plan of care Certification: 8/14/2024 to 10/11/24.     Continue with current POC toward established physical therapy  goals. Progress within protocol as able.    Chaenl Dc, PTA

## 2024-10-10 ENCOUNTER — CLINICAL SUPPORT (OUTPATIENT)
Dept: REHABILITATION | Facility: HOSPITAL | Age: 64
End: 2024-10-10
Payer: COMMERCIAL

## 2024-10-10 DIAGNOSIS — M25.60 DECREASED RANGE OF MOTION: Primary | ICD-10-CM

## 2024-10-10 DIAGNOSIS — R26.89 DECREASED FUNCTIONAL MOBILITY: ICD-10-CM

## 2024-10-10 DIAGNOSIS — M62.81 MUSCLE WEAKNESS: ICD-10-CM

## 2024-10-10 PROCEDURE — 97112 NEUROMUSCULAR REEDUCATION: CPT | Mod: PN,CQ

## 2024-10-10 PROCEDURE — 97110 THERAPEUTIC EXERCISES: CPT | Mod: PN,CQ

## 2024-10-23 NOTE — PROGRESS NOTES
"[facility]   Discharge Summary and Physical Therapy Treatment Note      Name: Jess Gresham  Clinic Number: [id number]    Therapy Diagnosis:   Encounter Diagnoses   Name Primary?    Decreased range of motion Yes    Muscle weakness     Decreased functional mobility        Physician: Wilbur Pa MD    Visit Date: 10/24/2024    Physician Orders: PT Eval and Treat; follow Type II RCR protocol for Wilbur Pa MD  Medical Diagnosis from Referral: Rotator cuff tear, non-traumatic, left [M75.102], Biceps tendinopathy, left   Evaluation Date: 8/14/2024  Authorization Period Expiration: 8/5/25  Plan of Care Expiration: 10/17/24  Visit # / Visits authorized: 12/ 15 +eval  FOTO: 3/3: 68; goal: 64 by visit 21 (met)     PTA Visit #: 1/5       Time In: 9:05  Time Out: 9:50  Total Billable Time: 45 minutes     Precautions: Standard  DOS: 8/8/24:   1. Left shoulder Arthroscopic rotator cuff repair CPT - 37642     2. Left shoulder Open Biceps Tenodesis" - 54912     3. Left shoulder Arthroscopic extensive debridement CPT - 00985     4. Left shoulder Arthroscopic subacromial decompression and bursectomy CPT - 29152      Subjective     Patient reports: she does not have L shoulder pain.  She was able to run without pain.  She is working on improving her ROM for swimming.  She was compliant with home exercise program.  Response to previous treatment: felt good, min soreness  Functional change: able to perform ADLs and work tasks without pain    Pain: 0/10 at rest  Location: left shoulder      Objective      Passive Range of Motion: (deg)  Shoulder Right Left   Flexion 170 170   Abduction 170 135   ER at 45 70 70   ER at 90 100 83   IR 80 70        AROM  Shoulder Right Left   Flexion 170 165   Abduction 170 160 into scaption, 150 abd    ER at 45 70 62   ER at 90 100 85   IR T9 T11     Upper Extremity Strength  (R) UE  (L) UE    Shoulder flexion: 5/5 Shoulder flexion: 5/5   Shoulder Abduction: 5/5 " Shoulder abduction: 5/5   Shoulder ER 5/5 Shoulder ER 5/5   Shoulder IR 5/5 Shoulder IR 5/5   Elbow flexion: 5/5 Elbow flexion: 5/5   Elbow extension: 5/5 Elbow extension: 5/5   Lower Trap 4+/5 Lower Trap 3+/5   Middle Trap 5/5 Middle Trap 4+/5   Rhomboids 5/5 Rhomboids 5/5       Sensation: grossly intact to light touch      Treatment     Jess received the treatments listed below:      therapeutic exercises to develop strength, ROM, and flexibility for 13 minutes including:  Reassessment  PROM all planes   posterior shoulder roll x10  AA sh IR towel x10, 5 sec hold  Reviewed HEP    Pt edu on progressions for returning to UE swim strokes     manual therapy techniques: Joint mobilizations and Soft tissue Mobilization were applied to the: left shoulder for 2 minutes, including:  NP Scar tissue mobilization over anterior incision, education on self scar mobilization for home  Gentle L GH jt inf and post mobs (grd II)    neuromuscular re-education activities to improve: Coordination, Kinesthetic, Proprioception, Posture, and Motor Control for 25 minutes. The following activities were included:  Supine sh stabilization 90 deg flex  Supine horiz abd Black TB 2x15  SL sh ER 2x10, 2#  Prone sh ext 2x15, 3#   Prone row 2x15 5#  Prone horiz abd (pain free ROM) 3#, 2 x 10   Sh flex wall slides with cues to avoid sh hike x10 - more serratus activation cues today  AROM scaption to 90 deg x10 (mirror for visual cues and mc to avoid UT compensation)  Wall slides with lower trap lift off x10      Patient Education and Home Exercises       Education provided:   - pain free ROM with exercises   Pt gave verbal understanding to all education provided     Written Home Exercises Provided: yes. Exercises were reviewed and Jess was able to demonstrate them prior to the end of the session.  Jess demonstrated good  understanding of the education provided. See Electronic Medical Record under Patient Instructions for exercises provided  during therapy sessions    Assessment     Patient met all goals.  She has some weakness to L lower trap and mid trap, which will continue to improve over time with HEP.  Pt demonstrated understanding of progressions for returning to swimming with UE strokes.    Patient's spiritual, cultural and educational needs considered and pt agreeable to plan of care and goals.     Anticipated barriers to physical therapy: currently not driving and dependent on transportation    Goals:   Short Term Goals:  4 weeks   1.Report decreased    L shoulder    pain  <   / =  4  /10 at its worst  to increase tolerance for ADLs (met)  2. Increased L shoulder PROM flexion to at least 150-160 deg for increased ease with ADLs.(met)  3. Increased L shoulder PROM ER at 45 deg abd to at least 60 deg for increased ease with ADLs.(met)  4. Pt will report ability to sleep through the night without L shoulder pain. (Met)  5. Pt to tolerate HEP to improve ROM and independence with ADL's (met)        Long Term Goals: 8 weeks   1.Report decreased    L shoulder    pain  <   / =  2  /10  to increase tolerance for ADLs (met)  2. Increased L shoulder AROM flexion to at least 150 deg for increased ease with ADLs. (Met)  3. Pt to tolerate HEP to improve ROM and independence with ADL's (met)  4. Pt will increase L shoulder AROM ER 50-60 deg at 0 deg abduction. (Met)  Pt presents with minimal weakness to L periscap muscles, which will continue to improve over time with HEP    Plan   Pt discharged from PT to continue to self manage with HEP.    Patsy Mejia, PT

## 2024-10-24 ENCOUNTER — CLINICAL SUPPORT (OUTPATIENT)
Dept: REHABILITATION | Facility: HOSPITAL | Age: 64
End: 2024-10-24
Payer: COMMERCIAL

## 2024-10-24 DIAGNOSIS — R26.89 DECREASED FUNCTIONAL MOBILITY: ICD-10-CM

## 2024-10-24 DIAGNOSIS — M62.81 MUSCLE WEAKNESS: ICD-10-CM

## 2024-10-24 DIAGNOSIS — M25.60 DECREASED RANGE OF MOTION: Primary | ICD-10-CM

## 2024-10-24 PROCEDURE — 97112 NEUROMUSCULAR REEDUCATION: CPT | Mod: PN

## 2024-10-24 PROCEDURE — 97110 THERAPEUTIC EXERCISES: CPT | Mod: PN

## 2024-11-20 ENCOUNTER — OFFICE VISIT (OUTPATIENT)
Dept: SPORTS MEDICINE | Facility: CLINIC | Age: 64
End: 2024-11-20
Payer: COMMERCIAL

## 2024-11-20 VITALS
WEIGHT: 121.81 LBS | DIASTOLIC BLOOD PRESSURE: 87 MMHG | HEART RATE: 90 BPM | SYSTOLIC BLOOD PRESSURE: 127 MMHG | BODY MASS INDEX: 23.91 KG/M2 | HEIGHT: 60 IN

## 2024-11-20 DIAGNOSIS — Z98.890 S/P LEFT ROTATOR CUFF REPAIR: Primary | ICD-10-CM

## 2024-11-20 PROCEDURE — 99213 OFFICE O/P EST LOW 20 MIN: CPT | Mod: S$GLB,,, | Performed by: ORTHOPAEDIC SURGERY

## 2024-11-20 PROCEDURE — 3008F BODY MASS INDEX DOCD: CPT | Mod: CPTII,S$GLB,, | Performed by: ORTHOPAEDIC SURGERY

## 2024-11-20 PROCEDURE — 99999 PR PBB SHADOW E&M-EST. PATIENT-LVL III: CPT | Mod: PBBFAC,,, | Performed by: ORTHOPAEDIC SURGERY

## 2024-11-20 PROCEDURE — 1159F MED LIST DOCD IN RCRD: CPT | Mod: CPTII,S$GLB,, | Performed by: ORTHOPAEDIC SURGERY

## 2024-11-20 PROCEDURE — 3044F HG A1C LEVEL LT 7.0%: CPT | Mod: CPTII,S$GLB,, | Performed by: ORTHOPAEDIC SURGERY

## 2024-11-20 PROCEDURE — 3074F SYST BP LT 130 MM HG: CPT | Mod: CPTII,S$GLB,, | Performed by: ORTHOPAEDIC SURGERY

## 2024-11-20 PROCEDURE — 3079F DIAST BP 80-89 MM HG: CPT | Mod: CPTII,S$GLB,, | Performed by: ORTHOPAEDIC SURGERY

## 2024-11-20 NOTE — PROGRESS NOTES
"POST-OPERATIVE EXAMINATION    64 y.o. Female who returns for follow after surgery. She is 3.5 months s/p:    Procedures Performed: 8/8/24  1. Left shoulder Arthroscopic rotator cuff repair CPT - 81688     2. Left shoulder Open Biceps Tenodesis" - 63578     3. Left shoulder Arthroscopic extensive debridement CPT - 62038     4. Left shoulder Arthroscopic subacromial decompression and bursectomy CPT - 79481    She is doing well without any issues. She stats she is happy with her progress and has been released from physical therapy, but is compliant with her HEP       PHYSICAL EXAMINATION:  [facility] 02/01/2013   General: Well-developed well-nourished 64 y.o. female in no acute distress   Cardiovascular: Regular rhythm   Lungs: No labored breathing or wheezing appreciated   Neuro: Alert and oriented ×3   Psychiatric: well oriented to person, place and time, demonstrates normal mood and affect   Skin: No rashes, lesions or ulcers, normal temperature, turgor, and texture on involved extremity    ORTHOPEDIC EXAM:  Normal post-operative swelling  Normal post-operative scarring  Strength: 5/5  ROM: FE- 160; ER/Abd- 80; IR/Abd- 60; ER/Add- 60  Tests: - [facility], - Evelyne Uriostegui        ASSESSMENT:      ICD-10-CM ICD-9-CM   1. S/P left rotator cuff repair  Z98.890 V45.89             PLAN:       I recommend that she continue with physical therapy for stretching   She can begin swimming breast stroke and swimming with a kick board. She can work into a modified/light free style stroke.   I recommend that she continue to work on her end range passive mobility   RTC at 6 months s/p             Wilbur Villarreal M.D.  [email or web address]    "

## 2024-11-22 ENCOUNTER — PATIENT MESSAGE (OUTPATIENT)
Dept: PODIATRY | Facility: CLINIC | Age: 64
End: 2024-11-22
Payer: COMMERCIAL

## 2024-11-22 ENCOUNTER — PATIENT MESSAGE (OUTPATIENT)
Dept: SPORTS MEDICINE | Facility: CLINIC | Age: 64
End: 2024-11-22
Payer: COMMERCIAL

## 2024-11-22 DIAGNOSIS — M75.102 ROTATOR CUFF TEAR, NON-TRAUMATIC, LEFT: Primary | ICD-10-CM

## 2024-11-22 DIAGNOSIS — Z98.890 S/P LEFT ROTATOR CUFF REPAIR: ICD-10-CM

## 2024-12-18 ENCOUNTER — CLINICAL SUPPORT (OUTPATIENT)
Dept: REHABILITATION | Facility: HOSPITAL | Age: 64
End: 2024-12-18
Payer: COMMERCIAL

## 2024-12-18 DIAGNOSIS — M25.612 DECREASED RANGE OF MOTION OF SHOULDER, LEFT: Primary | ICD-10-CM

## 2024-12-18 DIAGNOSIS — Z98.890 S/P LEFT ROTATOR CUFF REPAIR: ICD-10-CM

## 2024-12-18 DIAGNOSIS — M75.102 ROTATOR CUFF TEAR, NON-TRAUMATIC, LEFT: ICD-10-CM

## 2024-12-18 PROCEDURE — 97530 THERAPEUTIC ACTIVITIES: CPT | Mod: PO

## 2024-12-18 PROCEDURE — 97161 PT EVAL LOW COMPLEX 20 MIN: CPT | Mod: PO

## 2024-12-18 NOTE — PLAN OF CARE
[facility]   Physical Therapy Initial Evaluation      Name: Jess Gresham  Clinic Number: [id number]    Therapy Diagnosis:   Encounter Diagnoses   Name Primary?    S/P left rotator cuff repair     Rotator cuff tear, non-traumatic, left         Physician: Wilbur Pa MD    Physician Orders: PT Eval and Treat   Medical Diagnosis from Referral:   Z98.890 (ICD-10-CM) - S/P left rotator cuff repair   M75.102 (ICD-10-CM) - Rotator cuff tear, non-traumatic, left     Evaluation Date: 12/18/2024  Authorization Period Expiration: 11/22/2025  Plan of Care Expiration: 1/29/2025  Progress Note Due: 1/18/2025  Date of Surgery: 8/8/2024  Visit # / Visits authorized: 1/1   FOTO: 1/ 3    Precautions: Standard     Time In: 0800  Time Out: 0840  Total Billable Time: 40 minutes    Subjective     Date of onset: August 2024    History of current condition - Jess reports: Having a L RTC repair in August 2024. She reports undergoing PT for quite some time following surgery but feels that she just needs stretching still at this point.     Falls: none    Imaging: See EPIC    Prior Therapy: yes  Social History: lives with    Occupation: [employer]  Prior Level of Function: ind  Current Level of Function: ind    Pain:  Current 0/10, worst 0/10, best 0/10       Patients goals: improve range of motion and flexibility     Medical History:   Past Medical History:   Diagnosis Date    Abnormal Pap smear of cervix     in early 1990s---cone biopsy which was negative---all pap normal since        Surgical History:   Jess Gresham  has a past surgical history that includes Knee surgery (Bilateral); Colonoscopy (2014); Knee arthroscopy w/ meniscectomy (Left, 12/11/2020); Chondroplasty (Left, 12/11/2020); Arthroscopic repair of rotator cuff of shoulder (Left, 8/8/2024); Arthroscopy of shoulder with decompression of subacromial space (Left, 8/8/2024); Arthroscopic debridement of shoulder (Left, 8/8/2024); and  "tenodesis, biceps, open (Left, 8/8/2024).    Medications:   Jess has a current medication list which includes the following prescription(s): acetaminophen, atorvastatin, hydrocodone-acetaminophen, ibuprofen, multivitamin, and mupirocin.    Allergies:   Review of patient's allergies indicates:   Allergen Reactions    Ciprofloxacin Anaphylaxis    Darvocet a500 [propoxyphene n-acetaminophen] Hives    Oxycodone Hives        Objective      Posture: FHP noted      Active Range of Motion:   Shoulder Left Right   Flexion 155 deg WNL   Abduction 160 deg WNL   ER reach T1 WNL   IR reach T12 WNL     L shoulder passive range of motion:  Passive ER: 90 deg  Passive IR: 65 deg    Upper Extremity Strength   (L) UE (R) UE   Shoulder flexion: 5/5 5/5   Shoulder Abduction: 5/5 5/5   Shoulder ER 5/5 5/5   Shoulder IR 5/5 5/5   Lower Trap 4/5 4/5   Middle Trap 4+/5 4+/5   Rhomboids 4/5 4/5       Joint Mobility:hypomobile    Flexibility: decreased in L RTC internal rotator group and L latissimus dorsi        Intake Outcome Measure for FOTO shoulder Survey    Therapist reviewed FOTO scores for Jess Gresham on 12/18/2024.   FOTO report - see Media section or FOTO account episode details.    Intake Score: 33% limit         Treatment     Total Treatment time (time-based codes) separate from Evaluation: 15 minutes     Jess received the treatments listed below:        therapeutic activities to improve functional performance for 15  minutes, including:  Education on HEP to include:  Sleeper stretch 3x30"  Bilat IR c dowel 3x10 3#  Lat stretch rocks 3x10        Patient Education and Home Exercises     Education provided:   - on HEP and POC    Written Home Exercises Provided: Yes. Exercises were reviewed and Jess was able to demonstrate them prior to the end of the session.  Jess demonstrated good  understanding of the education provided. See EMR under Patient Instructions for exercises provided during therapy sessions.    Assessment "     Jess is a 64 y.o. female referred to outpatient Physical Therapy with a medical diagnosis of   Z98.890 (ICD-10-CM) - S/P left rotator cuff repair   M75.102 (ICD-10-CM) - Rotator cuff tear, non-traumatic, left   Patient presents with increased pain and decreased range of motion and strength. These deficits limit the patient from performing everyday activities to include lifting, carrying, bending, reaching, pushing, and pulling without pain or limitations. Pt appears to be healing well along post RTC timelines and presents with only c/o mild strength and flexibility deficits seen mostly in L RTC IR group, latissimus dorsi, and postural strength. These deficits were addressed with a HEP to target flexibility with good overall tolerance and no adverse reactions. Due to these deficits, pt will benefit from skilled PT services to improve mobility, control pain, and restore overall function.    Patient prognosis is Good.   Patient will benefit from skilled outpatient Physical Therapy to address the deficits stated above and in the chart below, provide patient /family education, and to maximize patientt's level of independence.     Plan of care discussed with patient: Yes  Patient's spiritual, cultural and educational needs considered and patient is agreeable to the plan of care and goals as stated below:     Anticipated Barriers for therapy: none    Medical Necessity is demonstrated by the following  History  Co-morbidities and personal factors that may impact the plan of care [x] LOW: no personal factors / co-morbidities  [] MODERATE: 1-2 personal factors / co-morbidities  [] HIGH: 3+ personal factors / co-morbidities    Moderate / High Support Documentation:   Co-morbidities affecting plan of care: none    Personal Factors:   no deficits     Examination  Body Structures and Functions, activity limitations and participation restrictions that may impact the plan of care [x] LOW: addressing 1-2 elements  [] MODERATE:  3+ elements  [] HIGH: 4+ elements (please support below)    Moderate / High Support Documentation:      Clinical Presentation [x] LOW: stable  [] MODERATE: Evolving  [] HIGH: Unstable     Decision Making/ Complexity Score: low       Goals:  To be met in 4-6 weeks   Pt to improve L shoulder flexion range of motion by 5 deg  Pt to improve L shoulder ER reach to T3  Pt to improve L shoulder IR reach to T8  Pt to improve postural strength by 1/2 grade  Pt to demo independence with final HEP    Plan     Plan of care Certification: 12/18/2024 to 1/29/2025.    Outpatient Physical Therapy 1 times weekly for 6 weeks to include the following interventions: Manual Therapy, Moist Heat/ Ice, Neuromuscular Re-ed, Patient Education, Self Care, Therapeutic Activities, and Therapeutic Exercise.       Maury Calvo, PT        Physician's Signature: _________________________________________ Date: ________________

## 2024-12-26 ENCOUNTER — CLINICAL SUPPORT (OUTPATIENT)
Dept: REHABILITATION | Facility: HOSPITAL | Age: 64
End: 2024-12-26
Payer: COMMERCIAL

## 2024-12-26 DIAGNOSIS — M25.612 DECREASED RANGE OF MOTION OF SHOULDER, LEFT: Primary | ICD-10-CM

## 2024-12-26 PROCEDURE — 97112 NEUROMUSCULAR REEDUCATION: CPT | Mod: PO,CQ

## 2024-12-26 PROCEDURE — 97110 THERAPEUTIC EXERCISES: CPT | Mod: PO,CQ

## 2024-12-26 PROCEDURE — 97140 MANUAL THERAPY 1/> REGIONS: CPT | Mod: PO,CQ

## 2024-12-26 NOTE — PROGRESS NOTES
"Physical Therapy Daily Treatment Note     Name: Jess Gresham  Clinic Number: [id number]    Therapy Diagnosis:   Encounter Diagnosis   Name Primary?    Decreased range of motion of shoulder, left Yes     Physician: Wilbur Pa MD    Visit Date: 12/26/2024    Physician Orders: PT Eval and Treat   Medical Diagnosis from Referral:   Z98.890 (ICD-10-CM) - S/P left rotator cuff repair   M75.102 (ICD-10-CM) - Rotator cuff tear, non-traumatic, left      Evaluation Date: 12/18/2024  Authorization Period Expiration: 11/22/2025  Plan of Care Expiration: 1/29/2025  Progress Note Due: 1/18/2025  Date of Surgery: 8/8/2024  Visit # / Visits authorized: 1/2  FOTO: 1/ 3     Time In: 9:00 am  Time Out: 9:55 am  Total Billable Time: 55 minutes    Precautions: Standard    Subjective     Pt reports: no L shoulder pain currently. She experienced min muscular soreness following swimming on Monday.    She was compliant with home exercise program.  Response to previous treatment: first follow up  Functional change: TBD    Pain: 0/10  Location: left shoulder      Objective     Objective measures displayed on progress notes unless otherwise noted       Treatment      Jess received therapeutic exercises to develop strength, endurance, ROM, flexibility, posture and core stabilization for 25 minutes including:    Sleeper stretch 3 x 30"   Standing Lat stretch w/dowel 3 x 10 5" holds  Standing IR stretch w/wand 3 x 10 5" holds  AAROM shoulder flex w/3# dowel x 20  AAROM shoulder abd w/3# dowel x 20  Pec stretch over 1/2 foam roll x 3 min    Jess received the following manual therapy techniques: Joint mobilizations were applied to the: L shoulder for 10 minutes, including:    PROM to L Shoulder in all directions  G/H inferior/posterior mobs    Jess participated in neuromuscular re-education activities to improve: Balance, Coordination, Proprioception and Posture for 20 minutes. The following activities were included:    Supine " horizontal abd over 1/2 foam roll GTB 3 x 10  Standing D2 flexion GTB 2 x 15  UBE x 4' forward 4' backward level 2      Home Exercises Provided and Patient Education Provided     Education provided:   - HEP review    Written Home Exercises Provided: Patient instructed to cont prior HEP.  Exercises were reviewed and Jess was able to demonstrate them prior to the end of the session.  Jess demonstrated good  understanding of the education provided.     See EMR under Patient Instructions for exercises provided prior visit.    Assessment     Jess returned for her first follow up visit without complaints of L shoulder pain. Treatment began with HEP review with additional L shoulder mobility, postural mobility, and L shoulder strengthening. Pt demonstrated fatigue in anterior L shoulder at end range of D2 flexion motion. All other exercises and manual techniques were tolerated well with no increase in pain. Will continue to progress per pt's tolerance.    Jess Is progressing well towards her goals.   Pt prognosis is Good.     Pt will continue to benefit from skilled outpatient physical therapy to address the deficits listed in the problem list box on initial evaluation, provide pt/family education and to maximize pt's level of independence in the home and community environment.     Pt's spiritual, cultural and educational needs considered and pt agreeable to plan of care and goals.     Anticipated barriers to physical therapy: none    Goals: To be met in 4-6 weeks   Pt to improve L shoulder flexion range of motion by 5 deg  Pt to improve L shoulder ER reach to T3  Pt to improve L shoulder IR reach to T8  Pt to improve postural strength by 1/2 grade  Pt to demo independence with final HEP    Plan     Plan of care Certification: 12/18/2024 to 1/29/2025.     Outpatient Physical Therapy 1 times weekly for 6 weeks to include the following interventions: Manual Therapy, Moist Heat/ Ice, Neuromuscular Re-ed, Patient  Education, Self Care, Therapeutic Activities, and Therapeutic Exercise.    Avelino Martínez, PTA

## 2024-12-30 ENCOUNTER — HOSPITAL ENCOUNTER (OUTPATIENT)
Dept: CARDIOLOGY | Facility: HOSPITAL | Age: 64
Discharge: HOME | End: 2024-12-30
Attending: INTERNAL MEDICINE
Payer: COMMERCIAL

## 2024-12-30 VITALS — WEIGHT: 121 LBS | BODY MASS INDEX: 23.75 KG/M2 | HEIGHT: 60 IN

## 2024-12-30 DIAGNOSIS — I77.810 MILD DILATION OF ASCENDING AORTA: ICD-10-CM

## 2024-12-30 LAB
ASCENDING AORTA: 3.42 CM
AV INDEX (PROSTH): 0.83
AV MEAN GRADIENT: 2.5 MMHG
AV PEAK GRADIENT: 4 MMHG
AV VALVE AREA BY VELOCITY RATIO: 3 CM²
AV VALVE AREA: 3.2 CM²
AV VELOCITY RATIO: 0.8
BSA FOR ECHO PROCEDURE: 1.52 M2
CV ECHO LV RWT: 0.33 CM
DOP CALC AO PEAK VEL: 1 M/S
DOP CALC AO VTI: 22.7 CM
DOP CALC LVOT AREA: 3.8 CM2
DOP CALC LVOT DIAMETER: 2.2 CM
DOP CALC LVOT PEAK VEL: 0.8 M/S
DOP CALC LVOT STROKE VOLUME: 71.8 CM3
DOP CALCLVOT PEAK VEL VTI: 18.9 CM
E WAVE DECELERATION TIME: 289.44 MSEC
E/A RATIO: 0.64
E/E' RATIO: 4.88 M/S
ECHO LV POSTERIOR WALL: 0.8 CM (ref 0.6–1.1)
FRACTIONAL SHORTENING: 36.7 % (ref 28–44)
INTERVENTRICULAR SEPTUM: 0.9 CM (ref 0.6–1.1)
IVRT: 117.03 MSEC
LEFT ATRIUM AREA SYSTOLIC (APICAL 2 CHAMBER): 17.66 CM2
LEFT ATRIUM AREA SYSTOLIC (APICAL 4 CHAMBER): 17.39 CM2
LEFT ATRIUM SIZE: 3.05 CM
LEFT ATRIUM VOLUME INDEX MOD: 31.7 ML/M2
LEFT ATRIUM VOLUME MOD: 47.94 ML
LEFT INTERNAL DIMENSION IN SYSTOLE: 3.1 CM (ref 2.1–4)
LEFT VENTRICLE DIASTOLIC VOLUME INDEX: 75.5 ML/M2
LEFT VENTRICLE DIASTOLIC VOLUME: 114.01 ML
LEFT VENTRICLE END SYSTOLIC VOLUME APICAL 2 CHAMBER: 49.16 ML
LEFT VENTRICLE END SYSTOLIC VOLUME APICAL 4 CHAMBER: 46.73 ML
LEFT VENTRICLE MASS INDEX: 94 G/M2
LEFT VENTRICLE SYSTOLIC VOLUME INDEX: 24.7 ML/M2
LEFT VENTRICLE SYSTOLIC VOLUME: 37.32 ML
LEFT VENTRICULAR INTERNAL DIMENSION IN DIASTOLE: 4.9 CM (ref 3.5–6)
LEFT VENTRICULAR MASS: 141.9 G
LV LATERAL E/E' RATIO: 4.33 M/S
LV SEPTAL E/E' RATIO: 5.57 M/S
LVED V (TEICH): 114.01 ML
LVES V (TEICH): 37.32 ML
LVOT MG: 1.32 MMHG
LVOT MV: 0.53 CM/S
MV PEAK A VEL: 0.61 M/S
MV PEAK E VEL: 0.39 M/S
MV STENOSIS PRESSURE HALF TIME: 83.94 MS
MV VALVE AREA P 1/2 METHOD: 2.62 CM2
OHS CV RV/LV RATIO: 0.88 CM
PISA TR MAX VEL: 2.22 M/S
PULM VEIN S/D RATIO: 1.74
PV PEAK D VEL: 0.42 M/S
PV PEAK S VEL: 0.73 M/S
RA PRESSURE ESTIMATED: 3 MMHG
RIGHT VENTRICLE DIASTOLIC BASEL DIMENSION: 4.3 CM
RIGHT VENTRICLE DIASTOLIC LENGTH: 5.5 CM
RIGHT VENTRICLE DIASTOLIC MID DIMENSION: 2.3 CM
RIGHT VENTRICULAR END-DIASTOLIC DIMENSION: 4.27 CM
RIGHT VENTRICULAR LENGTH IN DIASTOLE (APICAL 4-CHAMBER VIEW): 5.45 CM
RV MID DIAMA: 2.25 CM
RV TB RVSP: 5 MMHG
RV TISSUE DOPPLER FREE WALL SYSTOLIC VELOCITY 1 (APICAL 4 CHAMBER VIEW): 12.35 CM/S
SINUS: 3.44 CM
STJ: 2.78 CM
TDI LATERAL: 0.09 M/S
TDI SEPTAL: 0.07 M/S
TDI: 0.08 M/S
TR MAX PG: 20 MMHG
TRICUSPID ANNULAR PLANE SYSTOLIC EXCURSION: 2.02 CM
TV REST PULMONARY ARTERY PRESSURE: 23 MMHG
Z-SCORE OF LEFT VENTRICULAR DIMENSION IN END DIASTOLE: 0.98
Z-SCORE OF LEFT VENTRICULAR DIMENSION IN END SYSTOLE: 0.93

## 2024-12-30 PROCEDURE — 93306 TTE W/DOPPLER COMPLETE: CPT | Mod: 26,,, | Performed by: INTERNAL MEDICINE

## 2024-12-30 PROCEDURE — 93306 TTE W/DOPPLER COMPLETE: CPT | Mod: PO

## 2024-12-31 ENCOUNTER — TELEPHONE (OUTPATIENT)
Dept: FAMILY MEDICINE | Facility: CLINIC | Age: 64
End: 2024-12-31
Payer: COMMERCIAL

## 2025-01-07 ENCOUNTER — OFFICE VISIT (OUTPATIENT)
Dept: OBSTETRICS AND GYNECOLOGY | Facility: CLINIC | Age: 65
End: 2025-01-07
Payer: COMMERCIAL

## 2025-01-07 VITALS
DIASTOLIC BLOOD PRESSURE: 87 MMHG | BODY MASS INDEX: 24.23 KG/M2 | HEIGHT: 60 IN | WEIGHT: 123.44 LBS | SYSTOLIC BLOOD PRESSURE: 130 MMHG

## 2025-01-07 DIAGNOSIS — Z12.4 SCREENING FOR CERVICAL CANCER: Primary | ICD-10-CM

## 2025-01-07 DIAGNOSIS — N95.2 VAGINAL ATROPHY: ICD-10-CM

## 2025-01-07 PROCEDURE — 99386 PREV VISIT NEW AGE 40-64: CPT | Mod: 25,S$GLB,, | Performed by: STUDENT IN AN ORGANIZED HEALTH CARE EDUCATION/TRAINING PROGRAM

## 2025-01-07 PROCEDURE — 88175 CYTOPATH C/V AUTO FLUID REDO: CPT | Performed by: STUDENT IN AN ORGANIZED HEALTH CARE EDUCATION/TRAINING PROGRAM

## 2025-01-07 PROCEDURE — 87624 HPV HI-RISK TYP POOLED RSLT: CPT | Performed by: STUDENT IN AN ORGANIZED HEALTH CARE EDUCATION/TRAINING PROGRAM

## 2025-01-07 PROCEDURE — 99999 PR PBB SHADOW E&M-EST. PATIENT-LVL III: CPT | Mod: PBBFAC,,, | Performed by: STUDENT IN AN ORGANIZED HEALTH CARE EDUCATION/TRAINING PROGRAM

## 2025-01-07 PROCEDURE — 1159F MED LIST DOCD IN RCRD: CPT | Mod: CPTII,S$GLB,, | Performed by: STUDENT IN AN ORGANIZED HEALTH CARE EDUCATION/TRAINING PROGRAM

## 2025-01-07 PROCEDURE — 3008F BODY MASS INDEX DOCD: CPT | Mod: CPTII,S$GLB,, | Performed by: STUDENT IN AN ORGANIZED HEALTH CARE EDUCATION/TRAINING PROGRAM

## 2025-01-07 PROCEDURE — 3075F SYST BP GE 130 - 139MM HG: CPT | Mod: CPTII,S$GLB,, | Performed by: STUDENT IN AN ORGANIZED HEALTH CARE EDUCATION/TRAINING PROGRAM

## 2025-01-07 PROCEDURE — 99203 OFFICE O/P NEW LOW 30 MIN: CPT | Mod: 25,S$GLB,, | Performed by: STUDENT IN AN ORGANIZED HEALTH CARE EDUCATION/TRAINING PROGRAM

## 2025-01-07 PROCEDURE — 3079F DIAST BP 80-89 MM HG: CPT | Mod: CPTII,S$GLB,, | Performed by: STUDENT IN AN ORGANIZED HEALTH CARE EDUCATION/TRAINING PROGRAM

## 2025-01-07 RX ORDER — ESTRADIOL 10 UG/1
10 INSERT VAGINAL NIGHTLY
Qty: 36 EACH | Refills: 3 | Status: SHIPPED | OUTPATIENT
Start: 2025-01-07 | End: 2025-02-06

## 2025-01-07 NOTE — PROGRESS NOTES
History & Physical  Gynecology      SUBJECTIVE:     Chief Complaint: Well Woman and Establish Care       History of Present Illness:    Here today for well woman exam. Has vaginal atrophy.     Gyn: no PMB, hx of CKC in 90s, no abnormal pap since then. + vaginal atrophy, hasn't had sex in years. No immediate FH of gyn or colon cancer.     No tobacco use    Does triathalons     Review of patient's allergies indicates:   Allergen Reactions    Ciprofloxacin Anaphylaxis    Darvocet a500 [propoxyphene n-acetaminophen] Hives    Oxycodone Hives       Past Medical History:   Diagnosis Date    Abnormal Pap smear of cervix     in early 1990s---cone biopsy which was negative---all pap normal since      Past Surgical History:   Procedure Laterality Date    ARTHROSCOPIC DEBRIDEMENT OF SHOULDER Left 8/8/2024    Procedure: DEBRIDEMENT, SHOULDER, ARTHROSCOPIC;  Surgeon: Wilbur Pa MD;  Location: [facility] OR;  Service: Orthopedics;  Laterality: Left;    ARTHROSCOPIC REPAIR OF ROTATOR CUFF OF SHOULDER Left 8/8/2024    Procedure: REPAIR, ROTATOR CUFF, ARTHROSCOPIC;  Surgeon: Wilbur Pa MD;  Location: [facility] OR;  Service: Orthopedics;  Laterality: Left;  REGIONAL BLOCK    ARTHROSCOPY OF SHOULDER WITH DECOMPRESSION OF SUBACROMIAL SPACE Left 8/8/2024    Procedure: ARTHROSCOPY, SHOULDER, WITH SUBACROMIAL SPACE DECOMPRESSION;  Surgeon: Wilbur Pa MD;  Location: [facility] OR;  Service: Orthopedics;  Laterality: Left;    CHONDROPLASTY Left 12/11/2020    Procedure: CHONDROPLASTY;  Surgeon: Mikey Amaro MD;  Location: [facility] OR;  Service: Orthopedics;  Laterality: Left;    COLONOSCOPY  2014    KNEE ARTHROSCOPY W/ MENISCECTOMY Left 12/11/2020    Procedure: ARTHROSCOPY, KNEE, WITH MENISCECTOMY and a baker cyst aspiration;  Surgeon: Mikey Amaro MD;  Location: [facility] OR;  Service: Orthopedics;  Laterality: Left;  medial     KNEE SURGERY Bilateral     TENODESIS, BICEPS, OPEN Left 8/8/2024    Procedure: TENODESIS, BICEPS,  OPEN;  Surgeon: Wilbur Pa MD;  Location: [facility];  Service: Orthopedics;  Laterality: Left;  supectoral biceps tenodesis     OB History          0    Para   0    Term   0       0    AB   0    Living   0         SAB   0    IAB   0    Ectopic   0    Multiple   0    Live Births   0               Family History   Problem Relation Name Age of Onset    Heart disease Father      Skin cancer Father      Aortic aneurysm Father      Dementia Mother      No Known Problems Sister      Breast cancer Neg Hx      Colon cancer Neg Hx       Social History     Tobacco Use    Smoking status: Never    Smokeless tobacco: Never   Substance Use Topics    Alcohol use: Yes     Comment: once a week    Drug use: No       Current Outpatient Medications   Medication Sig    acetaminophen (TYLENOL) 500 MG tablet Take 500 mg by mouth every 6 (six) hours as needed for Pain.    atorvastatin (LIPITOR) 40 MG tablet Take 1 tablet (40 mg total) by mouth once daily.    multivitamin (THERAGRAN) per tablet Take 1 tablet by mouth once daily.    mupirocin (BACTROBAN) 2 % ointment Apply pea sized amount to inside of nostrils twice daily for 10 days    estradioL (IMVEXXY STARTER PACK) 10 mcg InPk Place 10 mcg vaginally every evening.    ibuprofen (ADVIL,MOTRIN) 200 MG tablet Take 200 mg by mouth every 6 (six) hours as needed for Pain. (Patient not taking: Reported on 2025)     No current facility-administered medications for this visit.         Review of Systems:  Review of Systems   Constitutional:  Negative for chills, fatigue and fever.   HENT:  Negative for congestion.    Eyes:  Negative for visual disturbance.   Respiratory:  Negative for cough and shortness of breath.    Cardiovascular:  Negative for chest pain and palpitations.   Gastrointestinal:  Negative for abdominal distention, abdominal pain, constipation, diarrhea, nausea and vomiting.   Genitourinary:  Negative for difficulty urinating, dysuria, hematuria, vaginal  bleeding and vaginal discharge.   Skin:  Negative for rash.   Neurological:  Negative for dizziness, seizures, light-headedness and headaches.   Hematological:  Does not bruise/bleed easily.   Psychiatric/Behavioral:  Negative for dysphoric mood. The patient is not nervous/anxious.         OBJECTIVE:     Physical Exam:  Physical Exam  Vitals reviewed.   Constitutional:       General: She is not in acute distress.     Appearance: Normal appearance. She is well-developed.   HENT:      Head: Normocephalic and atraumatic.   Cardiovascular:      Rate and Rhythm: Normal rate and regular rhythm.   Pulmonary:      Effort: Pulmonary effort is normal.   Chest:   Breasts:     Right: No inverted nipple, mass, nipple discharge, skin change or tenderness.      Left: No inverted nipple, mass, nipple discharge, skin change or tenderness.   Abdominal:      General: There is no distension.      Palpations: Abdomen is soft.      Tenderness: There is no abdominal tenderness.   Genitourinary:     Vagina: Normal.      Comments: Normal external female genitalia, normal hair distribution. Vaginal mucosa atrophic. No blood in vault. Cervix pink, non-friable, without lesion. No CMT. Uterus non tender, mobile, not enlarged. Adnexa without fullness or tenderness.    Skin:     General: Skin is warm.   Neurological:      Mental Status: She is alert and oriented to person, place, and time.   Psychiatric:         Behavior: Behavior normal.         Thought Content: Thought content normal.         Judgment: Judgment normal.           ASSESSMENT:       ICD-10-CM ICD-9-CM    1. Screening for cervical cancer  Z12.4 V76.2 Ambulatory referral/consult to Obstetrics / Gynecology      Liquid-Based Pap Smear, Screening      HPV High Risk Genotypes, PCR      2. Vaginal atrophy  N95.2 627.3 estradioL (IMVEXXY STARTER PACK) 10 mcg InPk          Orders Placed This Encounter   Procedures    HPV High Risk Genotypes, PCR     Release to patient->Immediate  Send  normal result to authorizing provider's In Basket if  patient is active on MyChart:->Yes     Order Specific Question:   Source     Answer:   Cervix     Order Specific Question:   Release to patient     Answer:   Immediate           Plan:   - Pap today  - MMG up to date  - colonoscopy up to date  - tobacco cessation n/a  - contraception n/a  - HPV vaccine n/a  - DEXA @ 65  - Counseled to take daily multivitamin. If patient is of reproductive age and not on contraception, to take prenatal vitamin. Patient has been counseled on the vitamin D and calcium requirements per ACOG recommendations.  Age   Calcium(mg/day)   Vitamin D (IU/day)  9-18    1300                       600  19-50  1000                       600  51-70  1200                       600  >70      1,200                      800  Patient to start vit D    Vaginal atrophy  - try imvexy x 2 months, if working well, will add in pelvic floor PT to help resume intercourse    Siobhan Valerio M.D.  Obstetrics and Gynecology

## 2025-01-10 LAB
FINAL PATHOLOGIC DIAGNOSIS: NORMAL
Lab: NORMAL

## 2025-01-16 ENCOUNTER — PATIENT MESSAGE (OUTPATIENT)
Dept: SPORTS MEDICINE | Facility: CLINIC | Age: 65
End: 2025-01-16
Payer: COMMERCIAL

## 2025-01-31 ENCOUNTER — PATIENT MESSAGE (OUTPATIENT)
Dept: OBSTETRICS AND GYNECOLOGY | Facility: CLINIC | Age: 65
End: 2025-01-31
Payer: COMMERCIAL

## 2025-02-13 ENCOUNTER — OFFICE VISIT (OUTPATIENT)
Dept: SPORTS MEDICINE | Facility: CLINIC | Age: 65
End: 2025-02-13
Payer: COMMERCIAL

## 2025-02-13 VITALS
HEART RATE: 77 BPM | WEIGHT: 122.94 LBS | SYSTOLIC BLOOD PRESSURE: 139 MMHG | HEIGHT: 60 IN | BODY MASS INDEX: 24.13 KG/M2 | DIASTOLIC BLOOD PRESSURE: 88 MMHG

## 2025-02-13 DIAGNOSIS — Z98.890 S/P LEFT ROTATOR CUFF REPAIR: Primary | ICD-10-CM

## 2025-02-13 PROCEDURE — 99999 PR PBB SHADOW E&M-EST. PATIENT-LVL III: CPT | Mod: PBBFAC,,, | Performed by: PHYSICIAN ASSISTANT

## 2025-02-13 NOTE — PROGRESS NOTES
"POST-OPERATIVE EXAMINATION    65 y.o. Female who returns for follow after surgery. She is 6 months s/p:    Procedures Performed: 8/8/24  1. Left shoulder Arthroscopic rotator cuff repair CPT - 60296     2. Left shoulder Open Biceps Tenodesis" - 12571     3. Left shoulder Arthroscopic extensive debridement CPT - 31441     4. Left shoulder Arthroscopic subacromial decompression and bursectomy CPT - 85697    She is doing well without any issues. She states she is happy with her progress and has been released from physical therapy, but is compliant with her HEP       PHYSICAL EXAMINATION:  /88 (Patient Position: Sitting)   Pulse 77   Ht 5' (1.524 m)   Wt 55.7 kg (122 lb 14.5 oz)   LMP 02/01/2013   BMI 24.00 kg/m²   General: Well-developed well-nourished 65 y.o. female in no acute distress   Cardiovascular: Regular rhythm   Lungs: No labored breathing or wheezing appreciated   Neuro: Alert and oriented ×3   Psychiatric: well oriented to person, place and time, demonstrates normal mood and affect   Skin: No rashes, lesions or ulcers, normal temperature, turgor, and texture on involved extremity    ORTHOPEDIC EXAM:  Normal post-operative swelling  Normal post-operative scarring  Strength: 5/5  ROM: FE- 180; ER/Abd- 80; IR/Abd- 60; ER/Add- 60  Tests: - Neer's, - Evelyne Uriostegui  5/5 supraspinatus and infraspinatus        ASSESSMENT:      ICD-10-CM ICD-9-CM   1. S/P left rotator cuff repair  Z98.890 V45.89               PLAN:       Continue to progress all activities as tolerated without restriction  RTC as needed               Soren Bravo PA-C, [facility]        "

## 2025-08-19 ENCOUNTER — PATIENT MESSAGE (OUTPATIENT)
Dept: ADMINISTRATIVE | Facility: HOSPITAL | Age: 65
End: 2025-08-19
Payer: COMMERCIAL

## (undated) DEVICE — Device

## (undated) DEVICE — WRAP SHLDR HIP ACCU THRM PACK

## (undated) DEVICE — CONNECTOR TUBING STR 5 IN 1

## (undated) DEVICE — ELECTRODE REM PLYHSV RETURN 9

## (undated) DEVICE — DRAPE STERI U-SHAPED 47X51IN

## (undated) DEVICE — SOL NACL IRR 3000ML

## (undated) DEVICE — DRESSING N ADH OIL EMUL 3X3

## (undated) DEVICE — DRAPE EXTREMITY W/ABC NON-SLIP

## (undated) DEVICE — SUT ETHILON 3/0 18IN PS-1

## (undated) DEVICE — SLEEVE SCD EXPRESS CALF MEDIUM

## (undated) DEVICE — PENCIL ROCKER SWITCH 10FT CORD

## (undated) DEVICE — NDL SUTURE FLEXIBLE

## (undated) DEVICE — TUBING CROSSFLOW INFLOW CASS

## (undated) DEVICE — NDL SPINAL 18GX3.5 SPINOCAN

## (undated) DEVICE — NDL 22GA X1 1/2 REG BEVEL

## (undated) DEVICE — SET EXT MALE LL 34IN

## (undated) DEVICE — TOWEL OR XRAY BLUE 17X26IN

## (undated) DEVICE — MAT SUCTION PUDDLEVAC ORANGE

## (undated) DEVICE — SPONGE COTTON TRAY 4X4IN

## (undated) DEVICE — GAUZE SPONGE 4X4 12PLY

## (undated) DEVICE — DRAPE THREE-QTR REINF 53X77IN

## (undated) DEVICE — GLOVE SURGICAL LATEX SZ 8

## (undated) DEVICE — SUT VICRYL 2-0 CT-2 VCP269H

## (undated) DEVICE — COVER PROXIMA MAYO STAND

## (undated) DEVICE — ADHESIVE DERMABOND ADVANCED

## (undated) DEVICE — UNDERGLOVES BIOGEL PI SIZE 8

## (undated) DEVICE — APPLICATOR CHLORAPREP ORN 26ML

## (undated) DEVICE — TOURNIQUET SB QC DP 34X4IN

## (undated) DEVICE — YANKAUER OPEN TIP W/O VENT

## (undated) DEVICE — SEE MEDLINE ITEM 152530

## (undated) DEVICE — CANNULA PASSPORT 8 MM X 5CM

## (undated) DEVICE — GOWN ECLIPSE REINF L4 XLNG XXL

## (undated) DEVICE — PAD CAST SPECIALIST STRL 6

## (undated) DEVICE — MANIFOLD 4 PORT

## (undated) DEVICE — DRAPE INCISE IOBAN 2 23X17IN

## (undated) DEVICE — SYR IRRIGATION BULB STER 60ML

## (undated) DEVICE — GLOVE PROTEXIS LTX MICRO 8

## (undated) DEVICE — GLOVE SENSICARE PI ALOE 8

## (undated) DEVICE — BLADE SURG 13CMX4.2MM

## (undated) DEVICE — BANDAGE ESMARK 6X12

## (undated) DEVICE — DRAPE STERI-DRAPE 1000 17X11IN

## (undated) DEVICE — SEE MEDLINE ITEM 146313

## (undated) DEVICE — PAD DERMAPROX THCK 11X15X1IN

## (undated) DEVICE — SUT CTD VICRYL 0 UND BR

## (undated) DEVICE — SEE MEDLINE ITEM 146231

## (undated) DEVICE — SET INFLOW TUBE ARTHSCP

## (undated) DEVICE — GOWN ECLIPSE REINF LVL4 TWL XL

## (undated) DEVICE — TUBE SET INFLOW/OUTFLOW

## (undated) DEVICE — KIT TRACTION SHLDR ST DISP LF

## (undated) DEVICE — SUT MONOCRYL PLUS UD 3-0 27

## (undated) DEVICE — BURR OVAL CUTTING 6 MM

## (undated) DEVICE — DRAPE PLASTIC U 60X72

## (undated) DEVICE — BLADE SURG CARBON STEEL SZ11

## (undated) DEVICE — COVER LIGHT HANDLE 80/CA

## (undated) DEVICE — PROBE ARTHSCP RF90 D 140MM

## (undated) DEVICE — GLOVE PROTEXIS LIGHT BROWN 8.5

## (undated) DEVICE — TUBING SUC UNIV W/CONN 12FT

## (undated) DEVICE — SEE MEDLINE ITEM 157216

## (undated) DEVICE — SOL IRR NACL .9% 3000ML

## (undated) DEVICE — GLOVE BIOGEL PI MICRO SZ 7

## (undated) DEVICE — GOWN SMARTGOWN LVL4 X-LONG XL

## (undated) DEVICE — UNDERGLOVES BIOGEL PI SIZE 7.5

## (undated) DEVICE — TAPE SURG MEDIPORE 6X72IN

## (undated) DEVICE — SOL NACL IRR 1000ML BTL

## (undated) DEVICE — PAD ABDOMINAL STERILE 8X10IN

## (undated) DEVICE — SUT ETHILON 3-0 PS2 18 BLK

## (undated) DEVICE — DRESSING GAUZE OIL EMUL 3X8

## (undated) DEVICE — BRACE ARC 2.0 SHOULDER UNIV

## (undated) DEVICE — CUTTER AGGRESSIVE PLUS 3.5MM

## (undated) DEVICE — PAD ABD 8X10 STERILE

## (undated) DEVICE — UNDERGLOVES BIOGEL PI SZ 7 LF